# Patient Record
Sex: FEMALE | Race: WHITE | NOT HISPANIC OR LATINO | Employment: FULL TIME | URBAN - METROPOLITAN AREA
[De-identification: names, ages, dates, MRNs, and addresses within clinical notes are randomized per-mention and may not be internally consistent; named-entity substitution may affect disease eponyms.]

---

## 2017-02-01 RX ORDER — METOPROLOL SUCCINATE 100 MG/1
TABLET, EXTENDED RELEASE ORAL
Qty: 90 TAB | Refills: 1 | Status: SHIPPED | OUTPATIENT
Start: 2017-02-01 | End: 2017-08-03 | Stop reason: SDUPTHER

## 2017-06-05 ENCOUNTER — TELEPHONE (OUTPATIENT)
Dept: CARDIOLOGY | Facility: MEDICAL CENTER | Age: 52
End: 2017-06-05

## 2017-06-05 DIAGNOSIS — I10 ESSENTIAL HYPERTENSION: ICD-10-CM

## 2017-06-05 RX ORDER — LISINOPRIL AND HYDROCHLOROTHIAZIDE 20; 12.5 MG/1; MG/1
1 TABLET ORAL DAILY
Qty: 90 TAB | Refills: 0 | OUTPATIENT
Start: 2017-06-05 | End: 2017-08-31

## 2017-06-05 NOTE — TELEPHONE ENCOUNTER
----- Message from Evelin Feuntes, Med Ass't sent at 6/5/2017  2:33 PM PDT -----  Regarding: FW: refill on Lisinopril-hydrochlorothiazide  This is pending in Dr. Baeza's basket, please phone in or RS approve  Thank you     ----- Message -----     From: Consuelo Leger     Sent: 6/5/2017   1:59 PM       To: Evelin Fuentes Med Ass't  Subject: refill on Lisinopril-hydrochlorothiazide         Evelin,         Patient calling about refill for Lisinopil-hydrochlorothiazide to [facility] on [address]. She said she made a follow up appt, and she will try to get a couple pills from pharmacy to cover her until she gets her refill. She can be reached at [phone].

## 2017-06-05 NOTE — TELEPHONE ENCOUNTER
----- Message from Evelin Fuentes, Med Ass't sent at 6/5/2017  2:33 PM PDT -----  Regarding: FW: refill on Lisinopril-hydrochlorothiazide  This is pending in Dr. Baeza's basket, please phone in or RS approve  Thank you     ----- Message -----     From: Consuelo Leger     Sent: 6/5/2017   1:59 PM       To: Evelin Fuentes Med Ass't  Subject: refill on Lisinopril-hydrochlorothiazide         Evelin,         Patient calling about refill for Lisinopil-hydrochlorothiazide to [facility] on [address]. She said she made a follow up appt, and she will try to get a couple pills from pharmacy to cover her until she gets her refill. She can be reached at [phone].

## 2017-08-03 RX ORDER — METOPROLOL SUCCINATE 100 MG/1
100 TABLET, EXTENDED RELEASE ORAL
Qty: 90 TAB | Refills: 1 | Status: SHIPPED | OUTPATIENT
Start: 2017-08-03 | End: 2018-01-08 | Stop reason: SDUPTHER

## 2017-08-03 NOTE — TELEPHONE ENCOUNTER
Was the patient seen in the last year in this department? Yes     Does patient have an active prescription for medications requested? No     Received Request Via: Patient     Last visit:9/30/16

## 2017-08-31 ENCOUNTER — OFFICE VISIT (OUTPATIENT)
Dept: CARDIOLOGY | Facility: MEDICAL CENTER | Age: 52
End: 2017-08-31
Payer: COMMERCIAL

## 2017-08-31 VITALS
HEART RATE: 66 BPM | OXYGEN SATURATION: 94 % | BODY MASS INDEX: 34.49 KG/M2 | SYSTOLIC BLOOD PRESSURE: 120 MMHG | WEIGHT: 207 LBS | HEIGHT: 65 IN | DIASTOLIC BLOOD PRESSURE: 80 MMHG

## 2017-08-31 DIAGNOSIS — I15.0 RENOVASCULAR HYPERTENSION: ICD-10-CM

## 2017-08-31 DIAGNOSIS — E78.2 MIXED HYPERLIPIDEMIA: ICD-10-CM

## 2017-08-31 DIAGNOSIS — E03.2 HYPOTHYROIDISM DUE TO MEDICATION: ICD-10-CM

## 2017-08-31 PROCEDURE — 99213 OFFICE O/P EST LOW 20 MIN: CPT | Performed by: INTERNAL MEDICINE

## 2017-08-31 RX ORDER — LOSARTAN POTASSIUM AND HYDROCHLOROTHIAZIDE 25; 100 MG/1; MG/1
1 TABLET ORAL DAILY
Qty: 30 TAB | Refills: 3 | Status: SHIPPED | OUTPATIENT
Start: 2017-08-31 | End: 2018-01-02 | Stop reason: SDUPTHER

## 2017-08-31 ASSESSMENT — ENCOUNTER SYMPTOMS
PSYCHIATRIC NEGATIVE: 1
COUGH: 1
NAUSEA: 0
WEAKNESS: 0
CONSTITUTIONAL NEGATIVE: 1
NEUROLOGICAL NEGATIVE: 1
SHORTNESS OF BREATH: 0
VOMITING: 0

## 2017-08-31 NOTE — PROGRESS NOTES
Subjective:   Clary Bah is a 51 y.o. female who presents today For follow-up of hypertension and SVT. She's had virtually no palpitations at all. The patient has not been physically active. Weight is up about 10 pounds. Her morning coffee Enrico a bit more persistent and we discussed changing her from an ACE inhibitor to an ARB.  No other real interval health issues. She has hypothyroidism and had recent thyroid testing through Dr. Taylor's office.    Past Medical History:   Diagnosis Date   • Anesthesia     slow to wake up   • Heart palpitations    • Hypertension    • Hypothyroid    • Vitamin D deficiency      Past Surgical History:   Procedure Laterality Date   • RECTOCELE REPAIR  3/15/2011    Performed by MARK RAM[facility]RE[facility]   • BIOPSY GENERAL  10/20/08    Performed [facility]   • HYSTEROSCOPY WITH VIDEO OPERATIVE  10/20/08    Performed by MARK RAMÍREZ at [facility]   • GYN SURGERY         • BRIANNE BY LAPAROSCOPY       Family History   Problem Relation Age of Onset   • Arthritis Mother    • Hypertension Mother    • Heart Disease Father    • Diabetes Father    • Hypertension Sister      History   Smoking Status   • Never Smoker   Smokeless Tobacco   • Never Used     No Known Allergies  Outpatient Encounter Prescriptions as of 2017   Medication Sig Dispense Refill   • losartan-hydrochlorothiazide (HYZAAR) 100-25 MG per tablet Take 1 Tab by mouth every day. 30 Tab 3   • metoprolol SR (TOPROL XL) 100 MG TABLET SR 24 HR Take 1 Tab by mouth every day. 90 Tab 1   • levothyroxine (SYNTHROID) 25 MCG Tab Take 1 Tab by mouth every day. Takes 88 mcg +25 mcg twice weekly, 88 mcg all other days. Dr. Taylor 30 Tab 11   • cyclosporin (RESTASIS) 0.05 % ophthalmic emulsion Place 1 Drop in both eyes 2 times a day.     • levothyroxine (SYNTHROID) 88 MCG TABS Take 1 Tab by mouth every day. 90 Tab 1   •  "[DISCONTINUED] lisinopril-hydrochlorothiazide (PRINZIDE, ZESTORETIC) 20-12.5 MG per tablet Take 1 Tab by mouth every day. 90 Tab 0     No facility-administered encounter medications on file as of 8/31/2017.      Review of Systems   Constitutional: Negative.  Negative for malaise/fatigue.   Respiratory: Positive for cough. Negative for shortness of breath.    Cardiovascular: Negative for chest pain and leg swelling.   Gastrointestinal: Negative for nausea and vomiting.   Neurological: Negative.  Negative for weakness.   Psychiatric/Behavioral: Negative.         Objective:   /80   Pulse 66   Ht 1.651 m (5' 5\")   Wt 93.9 kg (207 lb)   SpO2 94%   BMI 34.45 kg/m²     Physical Exam   Constitutional: She is oriented to person, place, and time. She appears well-developed and well-nourished. No distress.   HENT:   Head: Atraumatic.   Eyes: Conjunctivae and EOM are normal. Pupils are equal, round, and reactive to light.   Neck: Neck supple. No JVD present.   Cardiovascular: Normal rate and regular rhythm.    No murmur heard.  Pulmonary/Chest: Effort normal and breath sounds normal. No respiratory distress. She has no wheezes. She has no rales.   Abdominal: Soft. There is no tenderness.   Musculoskeletal: She exhibits no edema.   Neurological: She is alert and oriented to person, place, and time.   Skin: Skin is warm and dry. She is not diaphoretic.       Assessment:     1. Mixed hyperlipidemia  LIPID PROFILE    COMP METABOLIC PANEL   2. Renovascular hypertension  LIPID PROFILE    COMP METABOLIC PANEL   3. Hypothyroidism due to medication         Medical Decision Making:  Today's Assessment / Status / Plan:   The patient is having cough with ACE inhibitor. By my reading her blood pressure is 150 systolic using a large cuff sitting. We discussed weight loss and increase activity as a good method for blood pressure control rather than increasing her medications. She'll be changed to losartan/HCTZ 100/25. Appropriate " laboratory testing will be ordered including lipids. Return in about one month for blood pressure check

## 2017-09-10 LAB
ALBUMIN SERPL-MCNC: 4.2 G/DL (ref 3.5–5.5)
ALBUMIN/GLOB SERPL: 1.6 {RATIO} (ref 1.2–2.2)
ALP SERPL-CCNC: 84 IU/L (ref 39–117)
ALT SERPL-CCNC: 36 IU/L (ref 0–32)
AST SERPL-CCNC: 23 IU/L (ref 0–40)
BILIRUB SERPL-MCNC: 0.3 MG/DL (ref 0–1.2)
BUN SERPL-MCNC: 14 MG/DL (ref 6–24)
BUN/CREAT SERPL: 18 (ref 9–23)
CALCIUM SERPL-MCNC: 9.7 MG/DL (ref 8.7–10.2)
CHLORIDE SERPL-SCNC: 102 MMOL/L (ref 96–106)
CHOLEST SERPL-MCNC: 207 MG/DL (ref 100–199)
CO2 SERPL-SCNC: 23 MMOL/L (ref 18–29)
COMMENT 011824: ABNORMAL
CREAT SERPL-MCNC: 0.77 MG/DL (ref 0.57–1)
GLOBULIN SER CALC-MCNC: 2.6 G/DL (ref 1.5–4.5)
GLUCOSE SERPL-MCNC: 99 MG/DL (ref 65–99)
HDLC SERPL-MCNC: 39 MG/DL
LDLC SERPL CALC-MCNC: 137 MG/DL (ref 0–99)
POTASSIUM SERPL-SCNC: 4.3 MMOL/L (ref 3.5–5.2)
PROT SERPL-MCNC: 6.8 G/DL (ref 6–8.5)
SODIUM SERPL-SCNC: 142 MMOL/L (ref 134–144)
TRIGL SERPL-MCNC: 155 MG/DL (ref 0–149)
VLDLC SERPL CALC-MCNC: 31 MG/DL (ref 5–40)

## 2017-09-14 ENCOUNTER — TELEPHONE (OUTPATIENT)
Dept: CARDIOLOGY | Facility: MEDICAL CENTER | Age: 52
End: 2017-09-14

## 2017-09-14 NOTE — TELEPHONE ENCOUNTER
----- Message from Palak Sagastume R.N. sent at 9/13/2017  5:15 PM PDT -----      ----- Message -----  From: Earl Baeza M.D.  Sent: 9/13/2017   4:44 PM  To: Clau Pruitt L.P.N.    Labs reviewed.  LDL is still mildly elevated, but better than last year.All else is good.

## 2017-09-28 ENCOUNTER — OFFICE VISIT (OUTPATIENT)
Dept: CARDIOLOGY | Facility: MEDICAL CENTER | Age: 52
End: 2017-09-28
Payer: COMMERCIAL

## 2017-09-28 VITALS
HEIGHT: 65 IN | WEIGHT: 207 LBS | HEART RATE: 84 BPM | BODY MASS INDEX: 34.49 KG/M2 | DIASTOLIC BLOOD PRESSURE: 80 MMHG | OXYGEN SATURATION: 95 % | SYSTOLIC BLOOD PRESSURE: 120 MMHG

## 2017-09-28 DIAGNOSIS — R00.2 HEART PALPITATIONS: ICD-10-CM

## 2017-09-28 DIAGNOSIS — E78.2 MIXED HYPERLIPIDEMIA: ICD-10-CM

## 2017-09-28 DIAGNOSIS — I10 ESSENTIAL HYPERTENSION: Primary | ICD-10-CM

## 2017-09-28 DIAGNOSIS — I47.10 SVT (SUPRAVENTRICULAR TACHYCARDIA): ICD-10-CM

## 2017-09-28 PROCEDURE — 99214 OFFICE O/P EST MOD 30 MIN: CPT | Performed by: NURSE PRACTITIONER

## 2017-09-28 ASSESSMENT — ENCOUNTER SYMPTOMS
PND: 0
ABDOMINAL PAIN: 0
WEAKNESS: 0
ORTHOPNEA: 0
DIZZINESS: 1
COUGH: 0
CLAUDICATION: 0
SHORTNESS OF BREATH: 0
MYALGIAS: 0
PALPITATIONS: 0

## 2017-09-28 NOTE — PROGRESS NOTES
Subjective:   Clary Bah is a 51 y.o. female who presents today To follow-up on blood pressure and medication change. She was last seen approximately one month ago by Dr. Baeza. He stopped lisinopril since she had a cough. He started her on losartan//25 mg daily.    She is checked her blood pressure at the grocery store and it has been running approximately 118-120 systolic. She does have some lightheadedness with rapid position change. She would like to keep the diuretic portion of her medication as she does get some ankle edema at times.    She has a history of SVT but has not had any palpitations or episodes of SVT for an extended period of time.    She started doing some walking for exercise and is tolerating it well. 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    Past Medical History:   Diagnosis Date   • Anesthesia     slow to wake up   • Heart palpitations    • Hypertension    • Hypothyroid    • Vitamin D deficiency      Past Surgical History:   Procedure Laterality Date   • RECTOCELE REPAIR  3/15/2011    Performed by MARK RAMÍREZ at SURGERY SAME DAY [facility] ORS   • BIOPSY GENERAL  10/20/08    Performed by MARK RAMÍREZ at SURGERY SAME DAY [facility] ORS   • HYSTEROSCOPY WITH VIDEO OPERATIVE  10/20/08    Performed by MARK RAMÍREZ at SURGERY SAME DAY [facility] ORS   • GYN SURGERY         • BRIANNE BY LAPAROSCOPY       Family History   Problem Relation Age of Onset   • Arthritis Mother    • Hypertension Mother    • Heart Disease Father 67     heart attack   • Diabetes Father    • Hypertension Sister      History   Smoking Status   • Never Smoker   Smokeless Tobacco   • Never Used     Allergies   Allergen Reactions   • Ace Inhibitors Cough     Outpatient Encounter Prescriptions as of 2017   Medication Sig Dispense Refill   • losartan-hydrochlorothiazide (HYZAAR) 100-25 MG per tablet Take 1 Tab by mouth  "every day. 30 Tab 3   • metoprolol SR (TOPROL XL) 100 MG TABLET SR 24 HR Take 1 Tab by mouth every day. 90 Tab 1   • levothyroxine (SYNTHROID) 25 MCG Tab Take 1 Tab by mouth every day. Takes 88 mcg +25 mcg twice weekly, 88 mcg all other days. Dr. Taylor 30 Tab 11   • cyclosporin (RESTASIS) 0.05 % ophthalmic emulsion Place 1 Drop in both eyes 2 times a day.     • levothyroxine (SYNTHROID) 88 MCG TABS Take 1 Tab by mouth every day. 90 Tab 1     No facility-administered encounter medications on file as of 9/28/2017.      Review of Systems   Constitutional: Positive for malaise/fatigue.   Respiratory: Negative for cough and shortness of breath.    Cardiovascular: Negative for chest pain, palpitations, orthopnea, claudication, leg swelling and PND.   Gastrointestinal: Negative for abdominal pain.   Musculoskeletal: Negative for myalgias.   Neurological: Positive for dizziness (position change). Negative for weakness.        Objective:   /80   Pulse 84   Ht 1.651 m (5' 5\")   Wt 93.9 kg (207 lb)   SpO2 95%   BMI 34.45 kg/m²     Physical Exam   Constitutional: She is oriented to person, place, and time. She appears well-developed and well-nourished.   Central obesity.   HENT:   Head: Normocephalic.   Eyes: Conjunctivae are normal.   Neck: No JVD present. No thyromegaly present.   Cardiovascular: Normal rate, regular rhythm, S1 normal, S2 normal and normal heart sounds.  Exam reveals no gallop, no S3, no S4 and no friction rub.    No murmur heard.  Pulmonary/Chest: Effort normal and breath sounds normal. No respiratory distress. She has no wheezes. She has no rales.   Abdominal: Soft. Bowel sounds are normal. She exhibits no distension. There is no tenderness.   Musculoskeletal: She exhibits edema (trace to 1+ bilateral ankle edema.).   Neurological: She is alert and oriented to person, place, and time.   Skin: Skin is warm and dry.   Psychiatric: She has a normal mood and affect.     Results for PATEL BAUTISTA " (MRN [id number]) as of 9/28/2017 14:54   Ref. Range 9/8/2017 07:30   Sodium Latest Ref Range: 134 - 144 mmol/L 142   Potassium Latest Ref Range: 3.5 - 5.2 mmol/L 4.3   Chloride Latest Ref Range: 96 - 106 mmol/L 102   Co2 Latest Ref Range: 18 - 29 mmol/L 23   Glucose Latest Ref Range: 65 - 99 mg/dL 99   Bun Latest Ref Range: 6 - 24 mg/dL 14   Creatinine Latest Ref Range: 0.57 - 1.00 mg/dL 0.77   GFR If  Latest Ref Range: >59 mL/min/1.73 104   GFR If Non  Latest Ref Range: >59 mL/min/1.73 90   Bun-Creatinine Ratio Latest Ref Range: 9 - 23  18   Calcium Latest Ref Range: 8.7 - 10.2 mg/dL 9.7   AST(SGOT) Latest Ref Range: 0 - 40 IU/L 23   ALT(SGPT) Latest Ref Range: 0 - 32 IU/L 36 (H)   Alkaline Phosphatase Latest Ref Range: 39 - 117 IU/L 84   Total Bilirubin Latest Ref Range: 0.0 - 1.2 mg/dL 0.3   Albumin Latest Ref Range: 3.5 - 5.5 g/dL 4.2   Total Protein Latest Ref Range: 6.0 - 8.5 g/dL 6.8   Globulin Latest Ref Range: 1.5 - 4.5 g/dL 2.6   A-G Ratio Latest Ref Range: 1.2 - 2.2  1.6   Cholesterol,Tot Latest Ref Range: 100 - 199 mg/dL 207 (H)   Triglycerides Latest Ref Range: 0 - 149 mg/dL 155 (H)   HDL Latest Ref Range: >39 mg/dL 39 (L)   LDL Latest Ref Range: 0 - 99 mg/dL 137 (H)     Assessment:     1. Essential hypertension     2. Heart palpitations     3. SVT (supraventricular tachycardia)     4. Mixed hyperlipidemia  COMP METABOLIC PANEL    LIPID PROFILE       Medical Decision Making:  Today's Assessment / Status / Plan:     Hypertension: Her blood pressure is good in the office today but I'm concerned it may become too low. I've asked her to maintain her hydration. Instead of changing to losartan without the diuretic will continue on losartan//25 mg daily and have her monitor her blood pressure.    I've asked her to obtain a large blood pressure cuff and check her blood pressure morning and evening.    Palpitations: She is not had any palpitations or rapid heart beats and  extended period of time. She does not want to change her metoprolol dose as it appears to be working.    Hyperlipidemia: Her lipids are elevated in particular her LDL. She would like to work on diet and exercise. I would not one start her on a lipid-lowering agent without doing diet and lifestyle first. She is willing to increase her walking and change her diet. She is motivated to lose weight. We'll check lipids and metabolic panel in 3 months.    She will follow-up in 3 months with myself to monitor her blood pressure and lipids. She will follow-up sooner if problems.    Collaborating Provider: Dr. Norma Santizo

## 2017-09-28 NOTE — LETTER
[facility]   [address] [place] [address] [address]  [place] [address]  Phone: [phone]  Fax: [phone]              Clary Bah  1965    Encounter Date: 2017    JIMMIE Rosas          PROGRESS NOTE:  Subjective:   Clary Bah is a 51 y.o. female who presents today To follow-up on blood pressure and medication change. She was last seen approximately one month ago by Dr. Baeza. He stopped lisinopril since she had a cough. He started her on losartan//25 mg daily.    She is checked her blood pressure at the grocery store and it has been running approximately 118-120 systolic. She does have some lightheadedness with rapid position change. She would like to keep the diuretic portion of her medication as she does get some ankle edema at times.    She has a history of SVT but has not had any palpitations or episodes of SVT for an extended period of time.    She started doing some walking for exercise and is tolerating it well. 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    Past Medical History:   Diagnosis Date   • Anesthesia     slow to wake up   • Heart palpitations    • Hypertension    • Hypothyroid    • Vitamin D deficiency      Past Surgical History:   Procedure Laterality Date   • RECTOCELE REPAIR  3/15/2011    Performed by MARK RAMÍREZ at SURGERY SAME DAY [facility] ORS   • BIOPSY GENERAL  10/20/08    Performed by MARK RAMÍREZ at SURGERY SAME DAY [facility] ORS   • HYSTEROSCOPY WITH VIDEO OPERATIVE  10/20/08    Performed by MARK RAMÍREZ at SURGERY SAME DAY [facility] ORS   • GYN SURGERY         • BRIANNE BY LAPAROSCOPY       Family History   Problem Relation Age of Onset   • Arthritis Mother    • Hypertension Mother    • Heart Disease Father 67     heart attack   • Diabetes Father    • Hypertension Sister      History   Smoking Status   • Never Smoker   "  Smokeless Tobacco   • Never Used     Allergies   Allergen Reactions   • Ace Inhibitors Cough     Outpatient Encounter Prescriptions as of 9/28/2017   Medication Sig Dispense Refill   • losartan-hydrochlorothiazide (HYZAAR) 100-25 MG per tablet Take 1 Tab by mouth every day. 30 Tab 3   • metoprolol SR (TOPROL XL) 100 MG TABLET SR 24 HR Take 1 Tab by mouth every day. 90 Tab 1   • levothyroxine (SYNTHROID) 25 MCG Tab Take 1 Tab by mouth every day. Takes 88 mcg +25 mcg twice weekly, 88 mcg all other days. Dr. Taylor 30 Tab 11   • cyclosporin (RESTASIS) 0.05 % ophthalmic emulsion Place 1 Drop in both eyes 2 times a day.     • levothyroxine (SYNTHROID) 88 MCG TABS Take 1 Tab by mouth every day. 90 Tab 1     No facility-administered encounter medications on file as of 9/28/2017.      Review of Systems   Constitutional: Positive for malaise/fatigue.   Respiratory: Negative for cough and shortness of breath.    Cardiovascular: Negative for chest pain, palpitations, orthopnea, claudication, leg swelling and PND.   Gastrointestinal: Negative for abdominal pain.   Musculoskeletal: Negative for myalgias.   Neurological: Positive for dizziness (position change). Negative for weakness.        Objective:   /80   Pulse 84   Ht 1.651 m (5' 5\")   Wt 93.9 kg (207 lb)   SpO2 95%   BMI 34.45 kg/m²      Physical Exam   Constitutional: She is oriented to person, place, and time. She appears well-developed and well-nourished.   Central obesity.   HENT:   Head: Normocephalic.   Eyes: Conjunctivae are normal.   Neck: No JVD present. No thyromegaly present.   Cardiovascular: Normal rate, regular rhythm, S1 normal, S2 normal and normal heart sounds.  Exam reveals no gallop, no S3, no S4 and no friction rub.    No murmur heard.  Pulmonary/Chest: Effort normal and breath sounds normal. No respiratory distress. She has no wheezes. She has no rales.   Abdominal: Soft. Bowel sounds are normal. She exhibits no distension. There is no " tenderness.   Musculoskeletal: She exhibits edema (trace to 1+ bilateral ankle edema.).   Neurological: She is alert and oriented to person, place, and time.   Skin: Skin is warm and dry.   Psychiatric: She has a normal mood and affect.     Results for PATEL BAUTISTA (MRN [id number]) as of 9/28/2017 14:54   Ref. Range 9/8/2017 07:30   Sodium Latest Ref Range: 134 - 144 mmol/L 142   Potassium Latest Ref Range: 3.5 - 5.2 mmol/L 4.3   Chloride Latest Ref Range: 96 - 106 mmol/L 102   Co2 Latest Ref Range: 18 - 29 mmol/L 23   Glucose Latest Ref Range: 65 - 99 mg/dL 99   Bun Latest Ref Range: 6 - 24 mg/dL 14   Creatinine Latest Ref Range: 0.57 - 1.00 mg/dL 0.77   GFR If  Latest Ref Range: >59 mL/min/1.73 104   GFR If Non  Latest Ref Range: >59 mL/min/1.73 90   Bun-Creatinine Ratio Latest Ref Range: 9 - 23  18   Calcium Latest Ref Range: 8.7 - 10.2 mg/dL 9.7   AST(SGOT) Latest Ref Range: 0 - 40 IU/L 23   ALT(SGPT) Latest Ref Range: 0 - 32 IU/L 36 (H)   Alkaline Phosphatase Latest Ref Range: 39 - 117 IU/L 84   Total Bilirubin Latest Ref Range: 0.0 - 1.2 mg/dL 0.3   Albumin Latest Ref Range: 3.5 - 5.5 g/dL 4.2   Total Protein Latest Ref Range: 6.0 - 8.5 g/dL 6.8   Globulin Latest Ref Range: 1.5 - 4.5 g/dL 2.6   A-G Ratio Latest Ref Range: 1.2 - 2.2  1.6   Cholesterol,Tot Latest Ref Range: 100 - 199 mg/dL 207 (H)   Triglycerides Latest Ref Range: 0 - 149 mg/dL 155 (H)   HDL Latest Ref Range: >39 mg/dL 39 (L)   LDL Latest Ref Range: 0 - 99 mg/dL 137 (H)     Assessment:     1. Essential hypertension     2. Heart palpitations     3. SVT (supraventricular tachycardia)     4. Mixed hyperlipidemia  COMP METABOLIC PANEL    LIPID PROFILE       Medical Decision Making:  Today's Assessment / Status / Plan:     Hypertension: Her blood pressure is good in the office today but I'm concerned it may become too low. I've asked her to maintain her hydration. Instead of changing to losartan without the diuretic  will continue on losartan//25 mg daily and have her monitor her blood pressure.    I've asked her to obtain a large blood pressure cuff and check her blood pressure morning and evening.    Palpitations: She is not had any palpitations or rapid heart beats and extended period of time. She does not want to change her metoprolol dose as it appears to be working.    Hyperlipidemia: Her lipids are elevated in particular her LDL. She would like to work on diet and exercise. I would not one start her on a lipid-lowering agent without doing diet and lifestyle first. She is willing to increase her walking and change her diet. She is motivated to lose weight. We'll check lipids and metabolic panel in 3 months.    She will follow-up in 3 months with myself to monitor her blood pressure and lipids. She will follow-up sooner if problems.    Collaborating Provider: Dr. Norma Santizo        No Recipients

## 2017-12-14 ENCOUNTER — OFFICE VISIT (OUTPATIENT)
Dept: URGENT CARE | Facility: CLINIC | Age: 52
End: 2017-12-14
Payer: COMMERCIAL

## 2017-12-14 DIAGNOSIS — K52.9 AGE (ACUTE GASTROENTERITIS): ICD-10-CM

## 2017-12-14 PROCEDURE — 99214 OFFICE O/P EST MOD 30 MIN: CPT | Performed by: FAMILY MEDICINE

## 2017-12-14 NOTE — LETTER
December 14, 2017         Patient: Clary Bah   YOB: 1965   Date of Visit: 12/14/2017           To Whom it May Concern:    Clary Bah was seen in my clinic on 12/14/2017. She may return to work on Monday.    If you have any questions or concerns, please don't hesitate to call.        Sincerely,           Delbert Melo M.D.  Electronically Signed

## 2017-12-14 NOTE — PATIENT INSTRUCTIONS
Food Choices to Help Relieve Diarrhea, Adult  When you have diarrhea, the foods you eat and your eating habits are very important. Choosing the right foods and drinks can help relieve diarrhea. Also, because diarrhea can last up to 7 days, you need to replace lost fluids and electrolytes (such as sodium, potassium, and chloride) in order to help prevent dehydration.   WHAT GENERAL GUIDELINES DO I NEED TO FOLLOW?  · Slowly drink 1 cup (8 oz) of fluid for each episode of diarrhea. If you are getting enough fluid, your urine will be clear or pale yellow.  · Eat starchy foods. Some good choices include white rice, white toast, pasta, low-fiber cereal, baked potatoes (without the skin), saltine crackers, and bagels.  · Avoid large servings of any cooked vegetables.  · Limit fruit to two servings per day. A serving is ½ cup or 1 small piece.  · Choose foods with less than 2 g of fiber per serving.  · Limit fats to less than 8 tsp (38 g) per day.  · Avoid fried foods.  · Eat foods that have probiotics in them. Probiotics can be found in certain dairy products.  · Avoid foods and beverages that may increase the speed at which food moves through the stomach and intestines (gastrointestinal tract). Things to avoid include:  ¨ High-fiber foods, such as dried fruit, raw fruits and vegetables, nuts, seeds, and whole grain foods.  ¨ Spicy foods and high-fat foods.  ¨ Foods and beverages sweetened with high-fructose corn syrup, honey, or sugar alcohols such as xylitol, sorbitol, and mannitol.  WHAT FOODS ARE RECOMMENDED?  Grains  White rice. White, [place], or doris breads (fresh or toasted), including plain rolls, buns, or bagels. White pasta. Saltine, soda, or pialr crackers. Pretzels. Low-fiber cereal. Cooked cereals made with water (such as cornmeal, farina, or cream cereals). Plain muffins. Matzo. Sabrina toast. Zwieback.   Vegetables  Potatoes (without the skin). Strained tomato and vegetable juices. Most well-cooked and canned  vegetables without seeds. Tender lettuce.  Fruits  Cooked or canned applesauce, apricots, cherries, fruit cocktail, grapefruit, peaches, pears, or plums. Fresh bananas, apples without skin, cherries, grapes, cantaloupe, grapefruit, peaches, oranges, or plums.   Meat and Other Protein Products  Baked or boiled chicken. Eggs. Tofu. Fish. Seafood. Smooth peanut butter. Ground or well-cooked tender beef, ham, veal, lamb, pork, or poultry.   Dairy  Plain yogurt, kefir, and unsweetened liquid yogurt. Lactose-free milk, buttermilk, or soy milk. Plain hard cheese.  Beverages  Sport drinks. Clear broths. Diluted fruit juices (except prune). Regular, caffeine-free sodas such as ginger ale. Water. Decaffeinated teas. Oral rehydration solutions. Sugar-free beverages not sweetened with sugar alcohols.  Other  Bouillon, broth, or soups made from recommended foods.   The items listed above may not be a complete list of recommended foods or beverages. Contact your dietitian for more options.  WHAT FOODS ARE NOT RECOMMENDED?  Grains  Whole grain, whole wheat, bran, or rye breads, rolls, pastas, crackers, and cereals. Wild or brown rice. Cereals that contain more than 2 g of fiber per serving. Corn tortillas or taco shells. Cooked or dry oatmeal. Granola. Popcorn.  Vegetables  Raw vegetables. Cabbage, broccoli, [place] sprouts, artichokes, baked beans, beet greens, corn, kale, legumes, peas, sweet potatoes, and yams. Potato skins. Cooked spinach and cabbage.  Fruits  Dried fruit, including raisins and dates. Raw fruits. Stewed or dried prunes. Fresh apples with skin, apricots, mangoes, pears, raspberries, and strawberries.   Meat and Other Protein Products  [place] peanut butter. Nuts and seeds. Beans and lentils. Jones.   Dairy  High-fat cheeses. Milk, chocolate milk, and beverages made with milk, such as milk shakes. Cream. Ice cream.  Sweets and Desserts  Sweet rolls, doughnuts, and sweet breads. Pancakes and waffles.  Fats and  Oils  Butter. Cream sauces. Margarine. Salad oils. Plain salad dressings. Olives. Avocados.   Beverages  Caffeinated beverages (such as coffee, tea, soda, or energy drinks). Alcoholic beverages. Fruit juices with pulp. Prune juice. Soft drinks sweetened with high-fructose corn syrup or sugar alcohols.  Other  Coconut. Hot sauce. Chili powder. Mayonnaise. Gravy. Cream-based or milk-based soups.   The items listed above may not be a complete list of foods and beverages to avoid. Contact your dietitian for more information.  WHAT SHOULD I DO IF I BECOME DEHYDRATED?  Diarrhea can sometimes lead to dehydration. Signs of dehydration include dark urine and dry mouth and skin. If you think you are dehydrated, you should rehydrate with an oral rehydration solution. These solutions can be purchased at pharmacies, retail stores, or online.   Drink ½-1 cup (120-240 mL) of oral rehydration solution each time you have an episode of diarrhea. If drinking this amount makes your diarrhea worse, try drinking smaller amounts more often. For example, drink 1-3 tsp (5-15 mL) every 5-10 minutes.   A general rule for staying hydrated is to drink 1½-2 L of fluid per day. Talk to your health care provider about the specific amount you should be drinking each day. Drink enough fluids to keep your urine clear or pale yellow.     This information is not intended to replace advice given to you by your health care provider. Make sure you discuss any questions you have with your health care provider.     Document Released: 03/09/2005 Document Revised: 01/08/2016 Document Reviewed: 11/10/2014  [employer] Patient Education ©2016 [employer]

## 2017-12-16 NOTE — PROGRESS NOTES
Chief Complaint   Patient presents with   • Diarrhea     X 3 days, Loss of apetite,  needs dr note            Diarrhea   This is a new problem. The current episode started in the past 3 days. The problem occurs 5 to 10 times per day. The problem has been unchanged. The stool consistency is described as watery. The patient states that diarrhea does not awaken from sleep. Associated symptoms include bloating. Pertinent negatives include no chills, coughing, fever, headaches, vomiting or weight loss. Nothing aggravates the symptoms. There are no known risk factors. Patient has tried nothing for the symptoms. There is no history of inflammatory bowel disease.       Social History     Social History   • Marital status: Single     Spouse name: N/A   • Number of children: N/A   • Years of education: N/A     Occupational History   • Not on file.     Social History Main Topics   • Smoking status: Never Smoker   • Smokeless tobacco: Never Used   • Alcohol use Yes      Comment: social rare   • Drug use: No   • Sexual activity: Yes     Partners: Male      Comment: vasectomy     Other Topics Concern   • Not on file     Social History Narrative   • No narrative on file           Past Medical History:   Diagnosis Date   • Anesthesia     slow to wake up   • Heart palpitations    • Hypertension    • Hypothyroid    • Vitamin D deficiency            Review of Systems   Constitutional: Negative for fever, chills and weight loss.   Respiratory: Negative for cough and wheezing.    Cardiovascular: Negative for chest pain.   Gastrointestinal: Positive for diarrhea and bloating. Negative for nausea, vomiting, abdominal pain and blood in stool.   Neurological: Negative for dizziness and headaches.   All other systems reviewed and are negative.         Objective:       Physical Exam   Constitutional: pt is oriented to person, place, and time and appears well-developed. No distress.   HENT:   Head: Normocephalic and atraumatic.    Mouth/Throat: No oropharyngeal exudate.   Eyes: Conjunctivae are normal. No scleral icterus.   Cardiovascular: Normal rate, regular rhythm and normal heart sounds.    Pulmonary/Chest: Effort normal and breath sounds normal. No respiratory distress. Pt has no wheezes. Pt has no rales.   Abdominal: Normal appearance and bowel sounds are normal. There is no splenomegaly or hepatomegaly. There is no tenderness. There is no rebound, no guarding, no CVA tenderness and no tenderness at McBurney's point.   Lymphadenopathy:     Pt has no cervical adenopathy.   Neurological: pt is alert and oriented to person, place, and time.   Skin: Skin is warm. Pt is not diaphoretic. No erythema.   Psychiatric:  behavior is normal.   Nursing note and vitals reviewed.              Assessment/Plan:         1. AGE (acute gastroenteritis)  Push fluids  BRAT diet x 24 hrs  Imodium 2mg bid, prn    Follow up in two days if no improvement, sooner if symptoms worsen.

## 2018-01-02 ENCOUNTER — TELEPHONE (OUTPATIENT)
Dept: CARDIOLOGY | Facility: MEDICAL CENTER | Age: 53
End: 2018-01-02

## 2018-01-02 DIAGNOSIS — I10 ESSENTIAL HYPERTENSION: ICD-10-CM

## 2018-01-02 RX ORDER — LOSARTAN POTASSIUM AND HYDROCHLOROTHIAZIDE 25; 100 MG/1; MG/1
1 TABLET ORAL DAILY
Qty: 90 TAB | Refills: 3 | OUTPATIENT
Start: 2018-01-02 | End: 2018-12-28 | Stop reason: SDUPTHER

## 2018-01-03 NOTE — TELEPHONE ENCOUNTER
Patient returned my call in regards to lab order, patient reports she will have labs done this week @[facility].

## 2018-01-05 LAB
ALBUMIN SERPL-MCNC: 4.2 G/DL (ref 3.5–5.5)
ALBUMIN/GLOB SERPL: 1.7 {RATIO} (ref 1.2–2.2)
ALP SERPL-CCNC: 91 IU/L (ref 39–117)
ALT SERPL-CCNC: 40 IU/L (ref 0–32)
AST SERPL-CCNC: 25 IU/L (ref 0–40)
BILIRUB SERPL-MCNC: 0.3 MG/DL (ref 0–1.2)
BUN SERPL-MCNC: 15 MG/DL (ref 6–24)
BUN/CREAT SERPL: 18 (ref 9–23)
CALCIUM SERPL-MCNC: 9.7 MG/DL (ref 8.7–10.2)
CHLORIDE SERPL-SCNC: 109 MMOL/L (ref 96–106)
CHOLEST SERPL-MCNC: 185 MG/DL (ref 100–199)
CO2 SERPL-SCNC: 22 MMOL/L (ref 18–29)
COMMENT 011824: ABNORMAL
CREAT SERPL-MCNC: 0.85 MG/DL (ref 0.57–1)
GLOBULIN SER CALC-MCNC: 2.5 G/DL (ref 1.5–4.5)
GLUCOSE SERPL-MCNC: 88 MG/DL (ref 65–99)
HDLC SERPL-MCNC: 36 MG/DL
IF AFRICAN AMERICAN  100797: 92 ML/MIN/1.73
IF NON AFRICAN AMER 100791: 80 ML/MIN/1.73
LDLC SERPL CALC-MCNC: 126 MG/DL (ref 0–99)
POTASSIUM SERPL-SCNC: 4.7 MMOL/L (ref 3.5–5.2)
PROT SERPL-MCNC: 6.7 G/DL (ref 6–8.5)
SODIUM SERPL-SCNC: 147 MMOL/L (ref 134–144)
TRIGL SERPL-MCNC: 116 MG/DL (ref 0–149)
VLDLC SERPL CALC-MCNC: 23 MG/DL (ref 5–40)

## 2018-01-08 ENCOUNTER — OFFICE VISIT (OUTPATIENT)
Dept: CARDIOLOGY | Facility: MEDICAL CENTER | Age: 53
End: 2018-01-08
Payer: COMMERCIAL

## 2018-01-08 VITALS
WEIGHT: 205 LBS | HEIGHT: 65 IN | HEART RATE: 78 BPM | OXYGEN SATURATION: 94 % | SYSTOLIC BLOOD PRESSURE: 124 MMHG | BODY MASS INDEX: 34.16 KG/M2 | DIASTOLIC BLOOD PRESSURE: 78 MMHG

## 2018-01-08 DIAGNOSIS — I10 ESSENTIAL HYPERTENSION: Primary | ICD-10-CM

## 2018-01-08 DIAGNOSIS — E78.2 MIXED HYPERLIPIDEMIA: ICD-10-CM

## 2018-01-08 DIAGNOSIS — R00.2 HEART PALPITATIONS: ICD-10-CM

## 2018-01-08 PROCEDURE — 99213 OFFICE O/P EST LOW 20 MIN: CPT | Performed by: NURSE PRACTITIONER

## 2018-01-08 RX ORDER — METOPROLOL SUCCINATE 100 MG/1
100 TABLET, EXTENDED RELEASE ORAL
Qty: 90 TAB | Refills: 3 | Status: SHIPPED | OUTPATIENT
Start: 2018-01-08 | End: 2019-02-01 | Stop reason: SDUPTHER

## 2018-01-08 ASSESSMENT — ENCOUNTER SYMPTOMS
COUGH: 0
ORTHOPNEA: 0
PALPITATIONS: 1
PND: 0
CLAUDICATION: 0
ABDOMINAL PAIN: 0
WEAKNESS: 0
DIZZINESS: 0
SHORTNESS OF BREATH: 0
MYALGIAS: 0

## 2018-01-08 NOTE — LETTER
[facility]   [address]  [place] [address]  Phone: [phone]  Fax: [phone]              Clary Bah  1965    Encounter Date: 2018    JIMMIE Rosas          PROGRESS NOTE:  Subjective:   Clary Bah is a 52 y.o. female who presents today To follow-up on hypertension and palpitations. She did not obtain a blood pressure cuff but has been checking it at the store and at other physician's offices. She states it's been running good. 2 weeks ago and her doctor's office it was 114/60.    She occasionally has palpitations usually at night when she is laying down. She describes a brief skipping of her heart. She is not had any rapid, sustained heart rates.    She is getting more sleep and therefore she is less tired than previously. This is due to a schedule change at work.        Past Medical History:   Diagnosis Date   • Anesthesia     slow to wake up   • Heart palpitations    • Hypertension    • Hypothyroid    • Vitamin D deficiency      Past Surgical History:   Procedure Laterality Date   • RECTOCELE REPAIR  3/15/2011    Performed by MARK RAMÍREZ at SURGERY SAME DAY [facility] ORS   • BIOPSY GENERAL  10/20/08    Performed by MARK RAMÍREZ at SURGERY SAME DAY [facility] ORS   • HYSTEROSCOPY WITH VIDEO OPERATIVE  10/20/08    Performed by MARK RAMÍREZ at SURGERY SAME DAY [facility]   • GYN SURGERY         • BRIANNE BY LAPAROSCOPY       Family History   Problem Relation Age of Onset   • Arthritis Mother    • Hypertension Mother    • Heart Disease Father 67     heart attack   • Diabetes Father    • Hypertension Sister      History   Smoking Status   • Never Smoker   Smokeless Tobacco   • Never Used     Allergies   Allergen Reactions   • Ace Inhibitors Cough     Outpatient Encounter Prescriptions as of 2018   Medication Sig Dispense Refill   • metoprolol SR (TOPROL XL) 100 MG TABLET SR 24 HR Take 1 Tab  "by mouth every day. 90 Tab 3   • losartan-hydrochlorothiazide (HYZAAR) 100-25 MG per tablet Take 1 Tab by mouth every day. 90 Tab 3   • levothyroxine (SYNTHROID) 25 MCG Tab Take 1 Tab by mouth every day. Takes 88 mcg +25 mcg twice weekly, 88 mcg all other days. Dr. Taylor 30 Tab 11   • cyclosporin (RESTASIS) 0.05 % ophthalmic emulsion Place 1 Drop in both eyes 2 times a day.     • levothyroxine (SYNTHROID) 88 MCG TABS Take 1 Tab by mouth every day. 90 Tab 1   • [DISCONTINUED] metoprolol SR (TOPROL XL) 100 MG TABLET SR 24 HR Take 1 Tab by mouth every day. 90 Tab 1     No facility-administered encounter medications on file as of 1/8/2018.      Review of Systems   Constitutional: Negative for malaise/fatigue.   Respiratory: Negative for cough and shortness of breath.    Cardiovascular: Positive for palpitations (occasional). Negative for chest pain, orthopnea, claudication, leg swelling and PND.   Gastrointestinal: Negative for abdominal pain.   Musculoskeletal: Negative for myalgias.   Neurological: Negative for dizziness and weakness.        Objective:   /78   Pulse 78   Ht 1.651 m (5' 5\")   Wt 93 kg (205 lb)   SpO2 94%   BMI 34.11 kg/m²      Physical Exam   Constitutional: She is oriented to person, place, and time. She appears well-developed and well-nourished.   Central obesity.   HENT:   Head: Normocephalic.   Eyes: Conjunctivae are normal.   Neck: No JVD present. No thyromegaly present.   Cardiovascular: Normal rate, regular rhythm, S1 normal, S2 normal and normal heart sounds.  Exam reveals no gallop, no S3, no S4 and no friction rub.    No murmur heard.  Pulmonary/Chest: Effort normal and breath sounds normal. No respiratory distress. She has no wheezes. She has no rales.   Abdominal: Soft. Bowel sounds are normal. She exhibits no distension. There is no tenderness.   Musculoskeletal: She exhibits no edema.   Neurological: She is alert and oriented to person, place, and time.   Skin: Skin is warm " and dry.   Psychiatric: She has a normal mood and affect.     Results for PATEL BAUTISTA (MRN [id number]) as of 1/8/2018 08:16   Ref. Range 1/4/2018 07:27   Sodium Latest Ref Range: 134 - 144 mmol/L 147 (H)   Potassium Latest Ref Range: 3.5 - 5.2 mmol/L 4.7   Chloride Latest Ref Range: 96 - 106 mmol/L 109 (H)   Co2 Latest Ref Range: 18 - 29 mmol/L 22   Glucose Latest Ref Range: 65 - 99 mg/dL 88   Bun Latest Ref Range: 6 - 24 mg/dL 15   Creatinine Latest Ref Range: 0.57 - 1.00 mg/dL 0.85   GFR If  Latest Ref Range: >59 mL/min/1.73 92   GFR If Non  Latest Ref Range: >59 mL/min/1.73 80   Bun-Creatinine Ratio Latest Ref Range: 9 - 23  18   Calcium Latest Ref Range: 8.7 - 10.2 mg/dL 9.7   AST(SGOT) Latest Ref Range: 0 - 40 IU/L 25   ALT(SGPT) Latest Ref Range: 0 - 32 IU/L 40 (H)   Alkaline Phosphatase Latest Ref Range: 39 - 117 IU/L 91   Total Bilirubin Latest Ref Range: 0.0 - 1.2 mg/dL 0.3   Albumin Latest Ref Range: 3.5 - 5.5 g/dL 4.2   Total Protein Latest Ref Range: 6.0 - 8.5 g/dL 6.7   Globulin Latest Ref Range: 1.5 - 4.5 g/dL 2.5   A-G Ratio Latest Ref Range: 1.2 - 2.2  1.7   Cholesterol,Tot Latest Ref Range: 100 - 199 mg/dL 185   Triglycerides Latest Ref Range: 0 - 149 mg/dL 116   HDL Latest Ref Range: >39 mg/dL 36 (L)   LDL Latest Ref Range: 0 - 99 mg/dL 126 (H)   VLDL Cholesterol Calc Latest Ref Range: 5 - 40 mg/dL 23     Assessment:     1. Essential hypertension     2. Heart palpitations     3. Mixed hyperlipidemia  COMP METABOLIC PANEL    LIPID PROFILE       Medical Decision Making:  Today's Assessment / Status / Plan:     Hypertension: Her blood pressure is excellent and the office today. We will keep her on the same medications.    Palpitations: Rare. Continue on metoprolol. She understands that these are benign.    Hyperlipidemia: Her lipids are improved. I would like to see her LDL come down further and her HDL go up. She is encouraged to do cardio-type exercise. She states  she would like to start.    She will follow-up in 6 months with Dr. Baeza, with lab work prior. She will follow-up sooner or contact us using my chart if needed.    Collaborating Provider: Dr. La.        No Recipients

## 2018-01-08 NOTE — PROGRESS NOTES
Subjective:   Clary Bah is a 52 y.o. female who presents today To follow-up on hypertension and palpitations. She did not obtain a blood pressure cuff but has been checking it at the store and at other physician's offices. She states it's been running good. 2 weeks ago and her doctor's office it was 114/60.    She occasionally has palpitations usually at night when she is laying down. She describes a brief skipping of her heart. She is not had any rapid, sustained heart rates.    She is getting more sleep and therefore she is less tired than previously. This is due to a schedule change at work.        Past Medical History:   Diagnosis Date   • Anesthesia     slow to wake up   • Heart palpitations    • Hypertension    • Hypothyroid    • Vitamin D deficiency      Past Surgical History:   Procedure Laterality Date   • RECTOCELE REPAIR  3/15/2011    Performed by MARK RAMÍREZ at SURGERY SAME DAY [facility] ORS   • BIOPSY GENERAL  10/20/08    Performed by MARK RAMÍREZ at SURGERY SAME DAY [facility] ORS   • HYSTEROSCOPY WITH VIDEO OPERATIVE  10/20/08    Performed by MARK RAMÍREZ at SURGERY SAME DAY [facility] ORS   • GYN SURGERY         • BRIANNE BY LAPAROSCOPY       Family History   Problem Relation Age of Onset   • Arthritis Mother    • Hypertension Mother    • Heart Disease Father 67     heart attack   • Diabetes Father    • Hypertension Sister      History   Smoking Status   • Never Smoker   Smokeless Tobacco   • Never Used     Allergies   Allergen Reactions   • Ace Inhibitors Cough     Outpatient Encounter Prescriptions as of 2018   Medication Sig Dispense Refill   • metoprolol SR (TOPROL XL) 100 MG TABLET SR 24 HR Take 1 Tab by mouth every day. 90 Tab 3   • losartan-hydrochlorothiazide (HYZAAR) 100-25 MG per tablet Take 1 Tab by mouth every day. 90 Tab 3   • levothyroxine (SYNTHROID) 25 MCG Tab Take 1 Tab by mouth every day. Takes 88 mcg +25 mcg twice weekly, 88 mcg all other days.   "[place] 30 Tab 11   • cyclosporin (RESTASIS) 0.05 % ophthalmic emulsion Place 1 Drop in both eyes 2 times a day.     • levothyroxine (SYNTHROID) 88 MCG TABS Take 1 Tab by mouth every day. 90 Tab 1   • [DISCONTINUED] metoprolol SR (TOPROL XL) 100 MG TABLET SR 24 HR Take 1 Tab by mouth every day. 90 Tab 1     No facility-administered encounter medications on file as of 1/8/2018.      Review of Systems   Constitutional: Negative for malaise/fatigue.   Respiratory: Negative for cough and shortness of breath.    Cardiovascular: Positive for palpitations (occasional). Negative for chest pain, orthopnea, claudication, leg swelling and PND.   Gastrointestinal: Negative for abdominal pain.   Musculoskeletal: Negative for myalgias.   Neurological: Negative for dizziness and weakness.        Objective:   /78   Pulse 78   Ht 1.651 m (5' 5\")   Wt 93 kg (205 lb)   SpO2 94%   BMI 34.11 kg/m²     Physical Exam   Constitutional: She is oriented to person, place, and time. She appears well-developed and well-nourished.   Central obesity.   HENT:   Head: Normocephalic.   Eyes: Conjunctivae are normal.   Neck: No JVD present. No thyromegaly present.   Cardiovascular: Normal rate, regular rhythm, S1 normal, S2 normal and normal heart sounds.  Exam reveals no gallop, no S3, no S4 and no friction rub.    No murmur heard.  Pulmonary/Chest: Effort normal and breath sounds normal. No respiratory distress. She has no wheezes. She has no rales.   Abdominal: Soft. Bowel sounds are normal. She exhibits no distension. There is no tenderness.   Musculoskeletal: She exhibits no edema.   Neurological: She is alert and oriented to person, place, and time.   Skin: Skin is warm and dry.   Psychiatric: She has a normal mood and affect.     Results for PATEL BAUTISTA (MRN [id number]) as of 1/8/2018 08:16   Ref. Range 1/4/2018 07:27   Sodium Latest Ref Range: 134 - 144 mmol/L 147 (H)   Potassium Latest Ref Range: 3.5 - 5.2 mmol/L 4.7   Chloride " Latest Ref Range: 96 - 106 mmol/L 109 (H)   Co2 Latest Ref Range: 18 - 29 mmol/L 22   Glucose Latest Ref Range: 65 - 99 mg/dL 88   Bun Latest Ref Range: 6 - 24 mg/dL 15   Creatinine Latest Ref Range: 0.57 - 1.00 mg/dL 0.85   GFR If  Latest Ref Range: >59 mL/min/1.73 92   GFR If Non  Latest Ref Range: >59 mL/min/1.73 80   Bun-Creatinine Ratio Latest Ref Range: 9 - 23  18   Calcium Latest Ref Range: 8.7 - 10.2 mg/dL 9.7   AST(SGOT) Latest Ref Range: 0 - 40 IU/L 25   ALT(SGPT) Latest Ref Range: 0 - 32 IU/L 40 (H)   Alkaline Phosphatase Latest Ref Range: 39 - 117 IU/L 91   Total Bilirubin Latest Ref Range: 0.0 - 1.2 mg/dL 0.3   Albumin Latest Ref Range: 3.5 - 5.5 g/dL 4.2   Total Protein Latest Ref Range: 6.0 - 8.5 g/dL 6.7   Globulin Latest Ref Range: 1.5 - 4.5 g/dL 2.5   A-G Ratio Latest Ref Range: 1.2 - 2.2  1.7   Cholesterol,Tot Latest Ref Range: 100 - 199 mg/dL 185   Triglycerides Latest Ref Range: 0 - 149 mg/dL 116   HDL Latest Ref Range: >39 mg/dL 36 (L)   LDL Latest Ref Range: 0 - 99 mg/dL 126 (H)   VLDL Cholesterol Calc Latest Ref Range: 5 - 40 mg/dL 23     Assessment:     1. Essential hypertension     2. Heart palpitations     3. Mixed hyperlipidemia  COMP METABOLIC PANEL    LIPID PROFILE       Medical Decision Making:  Today's Assessment / Status / Plan:     Hypertension: Her blood pressure is excellent and the office today. We will keep her on the same medications.    Palpitations: Rare. Continue on metoprolol. She understands that these are benign.    Hyperlipidemia: Her lipids are improved. I would like to see her LDL come down further and her HDL go up. She is encouraged to do cardio-type exercise. She states she would like to start.    She will follow-up in 6 months with Dr. Baeza, with lab work prior. She will follow-up sooner or contact us using my chart if needed.    Collaborating Provider: Dr. La.

## 2018-02-16 ENCOUNTER — OFFICE VISIT (OUTPATIENT)
Dept: URGENT CARE | Facility: CLINIC | Age: 53
End: 2018-02-16
Payer: COMMERCIAL

## 2018-02-16 ENCOUNTER — HOSPITAL ENCOUNTER (OUTPATIENT)
Facility: MEDICAL CENTER | Age: 53
End: 2018-02-16
Attending: FAMILY MEDICINE
Payer: COMMERCIAL

## 2018-02-16 VITALS
WEIGHT: 209 LBS | DIASTOLIC BLOOD PRESSURE: 70 MMHG | OXYGEN SATURATION: 97 % | HEIGHT: 65 IN | SYSTOLIC BLOOD PRESSURE: 130 MMHG | TEMPERATURE: 98 F | HEART RATE: 66 BPM | BODY MASS INDEX: 34.82 KG/M2

## 2018-02-16 DIAGNOSIS — R30.0 DYSURIA: ICD-10-CM

## 2018-02-16 LAB
APPEARANCE UR: CLEAR
BILIRUB UR STRIP-MCNC: NORMAL MG/DL
COLOR UR AUTO: YELLOW
GLUCOSE UR STRIP.AUTO-MCNC: NORMAL MG/DL
KETONES UR STRIP.AUTO-MCNC: NORMAL MG/DL
LEUKOCYTE ESTERASE UR QL STRIP.AUTO: NORMAL
NITRITE UR QL STRIP.AUTO: NORMAL
PH UR STRIP.AUTO: 7 [PH] (ref 5–8)
PROT UR QL STRIP: NORMAL MG/DL
RBC UR QL AUTO: NORMAL
SP GR UR STRIP.AUTO: 1.01
UROBILINOGEN UR STRIP-MCNC: NORMAL MG/DL

## 2018-02-16 PROCEDURE — 81002 URINALYSIS NONAUTO W/O SCOPE: CPT | Performed by: FAMILY MEDICINE

## 2018-02-16 PROCEDURE — 99214 OFFICE O/P EST MOD 30 MIN: CPT | Performed by: FAMILY MEDICINE

## 2018-02-16 PROCEDURE — 87086 URINE CULTURE/COLONY COUNT: CPT

## 2018-02-16 RX ORDER — NITROFURANTOIN 25; 75 MG/1; MG/1
100 CAPSULE ORAL EVERY 12 HOURS
Qty: 10 CAP | Refills: 0 | Status: SHIPPED | OUTPATIENT
Start: 2018-02-16 | End: 2018-02-21

## 2018-02-16 RX ORDER — PHENAZOPYRIDINE HYDROCHLORIDE 200 MG/1
200 TABLET, FILM COATED ORAL 3 TIMES DAILY
Qty: 6 TAB | Refills: 0 | Status: SHIPPED | OUTPATIENT
Start: 2018-02-16 | End: 2018-02-18

## 2018-02-16 ASSESSMENT — ENCOUNTER SYMPTOMS
FOCAL WEAKNESS: 0
FEVER: 0
DIZZINESS: 0
ABDOMINAL PAIN: 0
CHILLS: 0
NAUSEA: 0
VOMITING: 0

## 2018-02-16 NOTE — PROGRESS NOTES
"Subjective:      Clary Bah is a 52 y.o. female who presents with Dysuria (urgency x 3 days)    Chief Complaint   Patient presents with   • Dysuria     urgency x 3 days        - This is a very pleasant 52 y.o. female with complaints of freq/pain x 3 days.           ALLERGIES:  Ace inhibitors     PMH:  Past Medical History:   Diagnosis Date   • Anesthesia     slow to wake up   • Heart palpitations    • Hypertension    • Hypothyroid    • Vitamin D deficiency         MEDS:    Current Outpatient Prescriptions:   •  nitrofurantoin monohydr macro (MACROBID) 100 MG Cap, Take 1 Cap by mouth every 12 hours for 5 days., Disp: 10 Cap, Rfl: 0  •  phenazopyridine (PYRIDIUM) 200 MG Tab, Take 1 Tab by mouth 3 times a day for 2 days., Disp: 6 Tab, Rfl: 0  •  metoprolol SR (TOPROL XL) 100 MG TABLET SR 24 HR, Take 1 Tab by mouth every day., Disp: 90 Tab, Rfl: 3  •  losartan-hydrochlorothiazide (HYZAAR) 100-25 MG per tablet, Take 1 Tab by mouth every day., Disp: 90 Tab, Rfl: 3  •  cyclosporin (RESTASIS) 0.05 % ophthalmic emulsion, Place 1 Drop in both eyes 2 times a day., Disp: , Rfl:   •  levothyroxine (SYNTHROID) 88 MCG TABS, Take 1 Tab by mouth every day., Disp: 90 Tab, Rfl: 1  •  levothyroxine (SYNTHROID) 25 MCG Tab, Take 1 Tab by mouth every day. Takes 88 mcg +25 mcg twice weekly, 88 mcg all other days. Dr. Taylor, Disp: 30 Tab, Rfl: 11    ** I have documented what I find to be significant in regards to past medical, social, family and surgical history  in my HPI or under PMH/PSH/FH review section, otherwise it is contributory **           HPI    Review of Systems   Constitutional: Negative for chills and fever.   Gastrointestinal: Negative for abdominal pain, nausea and vomiting.   Genitourinary: Positive for dysuria and frequency.   Neurological: Negative for dizziness and focal weakness.          Objective:     /70   Pulse 66   Temp 36.7 °C (98 °F)   Ht 1.651 m (5' 5\")   Wt 94.8 kg (209 lb)   SpO2 97%   BMI " 34.78 kg/m²      Physical Exam   Constitutional: She appears well-developed. No distress.   HENT:   Head: Normocephalic and atraumatic.   Neck: Neck supple.   Cardiovascular: Regular rhythm.    No murmur heard.  Pulmonary/Chest: Effort normal. No respiratory distress.   Abdominal: Soft. There is no tenderness.   Neurological: She is alert. She exhibits normal muscle tone.   Skin: Skin is warm and dry.   Psychiatric: She has a normal mood and affect. Judgment normal.   Nursing note and vitals reviewed.              Assessment/Plan:         1. Dysuria  POCT Urinalysis    Urine Culture    nitrofurantoin monohydr macro (MACROBID) 100 MG Cap    phenazopyridine (PYRIDIUM) 200 MG Tab             Dx & d/c instructions discussed w/ patient and/or family members. Follow up w/ Prvt Dr or here in 3-4 days if not getting better, sooner if needed,  ER if worse and UC/PCP unavailable.        Possible side effects (i.e. Rash, GI upset/constipation, sedation, elevation of BP or sugars) of any medications given discussed.

## 2018-02-17 DIAGNOSIS — R30.0 DYSURIA: ICD-10-CM

## 2018-02-19 LAB
BACTERIA UR CULT: NORMAL
SIGNIFICANT IND 70042: NORMAL
SITE SITE: NORMAL
SOURCE SOURCE: NORMAL

## 2018-02-27 ENCOUNTER — APPOINTMENT (RX ONLY)
Dept: URBAN - METROPOLITAN AREA CLINIC 4 | Facility: CLINIC | Age: 53
Setting detail: DERMATOLOGY
End: 2018-02-27

## 2018-02-27 DIAGNOSIS — D18.0 HEMANGIOMA: ICD-10-CM

## 2018-02-27 DIAGNOSIS — D22 MELANOCYTIC NEVI: ICD-10-CM

## 2018-02-27 DIAGNOSIS — L30.9 DERMATITIS, UNSPECIFIED: ICD-10-CM

## 2018-02-27 DIAGNOSIS — Z85.828 PERSONAL HISTORY OF OTHER MALIGNANT NEOPLASM OF SKIN: ICD-10-CM

## 2018-02-27 DIAGNOSIS — L81.4 OTHER MELANIN HYPERPIGMENTATION: ICD-10-CM

## 2018-02-27 DIAGNOSIS — L57.8 OTHER SKIN CHANGES DUE TO CHRONIC EXPOSURE TO NONIONIZING RADIATION: ICD-10-CM

## 2018-02-27 DIAGNOSIS — L82.1 OTHER SEBORRHEIC KERATOSIS: ICD-10-CM

## 2018-02-27 PROBLEM — D22.5 MELANOCYTIC NEVI OF TRUNK: Status: ACTIVE | Noted: 2018-02-27

## 2018-02-27 PROBLEM — D18.01 HEMANGIOMA OF SKIN AND SUBCUTANEOUS TISSUE: Status: ACTIVE | Noted: 2018-02-27

## 2018-02-27 PROCEDURE — 99213 OFFICE O/P EST LOW 20 MIN: CPT

## 2018-02-27 PROCEDURE — ? COUNSELING

## 2018-02-27 PROCEDURE — ? PRESCRIPTION

## 2018-02-27 RX ORDER — TRIAMCINOLONE ACETONIDE 1 MG/G
CREAM TOPICAL BID
Qty: 1 | Refills: 3 | Status: ERX | COMMUNITY
Start: 2018-02-27

## 2018-02-27 RX ADMIN — TRIAMCINOLONE ACETONIDE 1: 1 CREAM TOPICAL at 00:00

## 2018-02-27 ASSESSMENT — LOCATION SIMPLE DESCRIPTION DERM
LOCATION SIMPLE: LEFT UPPER BACK
LOCATION SIMPLE: RIGHT CHEEK
LOCATION SIMPLE: LEFT HAND
LOCATION SIMPLE: RIGHT ANTERIOR NECK
LOCATION SIMPLE: RIGHT HAND
LOCATION SIMPLE: RIGHT UPPER BACK

## 2018-02-27 ASSESSMENT — LOCATION ZONE DERM
LOCATION ZONE: HAND
LOCATION ZONE: NECK
LOCATION ZONE: TRUNK
LOCATION ZONE: FACE

## 2018-02-27 ASSESSMENT — LOCATION DETAILED DESCRIPTION DERM
LOCATION DETAILED: RIGHT INFERIOR CENTRAL MALAR CHEEK
LOCATION DETAILED: RIGHT INFERIOR UPPER BACK
LOCATION DETAILED: LEFT SUPERIOR UPPER BACK
LOCATION DETAILED: LEFT RADIAL DORSAL HAND
LOCATION DETAILED: RIGHT INFERIOR MEDIAL UPPER BACK
LOCATION DETAILED: RIGHT INFERIOR ANTERIOR NECK
LOCATION DETAILED: RIGHT SUPERIOR UPPER BACK
LOCATION DETAILED: RIGHT RADIAL DORSAL HAND

## 2018-05-12 ENCOUNTER — OFFICE VISIT (OUTPATIENT)
Dept: URGENT CARE | Facility: CLINIC | Age: 53
End: 2018-05-12
Payer: COMMERCIAL

## 2018-05-12 VITALS
BODY MASS INDEX: 35.65 KG/M2 | HEART RATE: 74 BPM | TEMPERATURE: 97.6 F | WEIGHT: 214 LBS | RESPIRATION RATE: 16 BRPM | OXYGEN SATURATION: 99 % | DIASTOLIC BLOOD PRESSURE: 74 MMHG | HEIGHT: 65 IN | SYSTOLIC BLOOD PRESSURE: 120 MMHG

## 2018-05-12 DIAGNOSIS — R05.9 COUGH: ICD-10-CM

## 2018-05-12 DIAGNOSIS — H92.09 OTALGIA, UNSPECIFIED LATERALITY: ICD-10-CM

## 2018-05-12 DIAGNOSIS — J03.90 TONSILLITIS: ICD-10-CM

## 2018-05-12 LAB
INT CON NEG: NORMAL
INT CON POS: NORMAL
S PYO AG THROAT QL: NEGATIVE

## 2018-05-12 PROCEDURE — 99214 OFFICE O/P EST MOD 30 MIN: CPT | Performed by: PHYSICIAN ASSISTANT

## 2018-05-12 PROCEDURE — 87880 STREP A ASSAY W/OPTIC: CPT | Performed by: PHYSICIAN ASSISTANT

## 2018-05-12 RX ORDER — CEFUROXIME AXETIL 500 MG/1
500 TABLET ORAL 2 TIMES DAILY
Qty: 20 TAB | Refills: 0 | Status: SHIPPED | OUTPATIENT
Start: 2018-05-12 | End: 2018-05-22

## 2018-05-12 ASSESSMENT — ENCOUNTER SYMPTOMS
CARDIOVASCULAR NEGATIVE: 1
SWOLLEN GLANDS: 1
COUGH: 1
EYES NEGATIVE: 1
SORE THROAT: 1
SPUTUM PRODUCTION: 0
CONSTITUTIONAL NEGATIVE: 1
SHORTNESS OF BREATH: 0
TROUBLE SWALLOWING: 1
WHEEZING: 0

## 2018-05-12 NOTE — PROGRESS NOTES
Subjective:      Clary Bah is a 52 y.o. female who presents with Pharyngitis (sore throat, ear pain, cough)            Pharyngitis    This is a new (st, cough, ear pn) problem. The current episode started in the past 7 days. The problem has been unchanged. Neither side of throat is experiencing more pain than the other. There has been no fever. The pain is moderate. Associated symptoms include coughing, ear pain, swollen glands and trouble swallowing. Pertinent negatives include no shortness of breath. She has had no exposure to strep or mono. She has tried nothing for the symptoms. The treatment provided no relief.       Review of Systems   Constitutional: Negative.    HENT: Positive for ear pain, sore throat and trouble swallowing.    Eyes: Negative.    Respiratory: Positive for cough. Negative for sputum production, shortness of breath and wheezing.    Cardiovascular: Negative.    Skin: Negative.           Objective:     There were no vitals taken for this visit.     Physical Exam   Constitutional: She is oriented to person, place, and time. She appears well-developed and well-nourished. No distress.   HENT:   Head: Normocephalic and atraumatic.   +phar./tons redn     Eyes: EOM are normal. Pupils are equal, round, and reactive to light.   Neck: Normal range of motion. Neck supple.   Cardiovascular: Normal rate.    Pulmonary/Chest: Effort normal and breath sounds normal. No respiratory distress. She has no wheezes. She has no rales.   Lymphadenopathy:     She has cervical adenopathy.   Neurological: She is alert and oriented to person, place, and time.   Skin: Skin is warm and dry.   Psychiatric: She has a normal mood and affect. Her behavior is normal.   Nursing note and vitals reviewed.    Active Ambulatory Problems     Diagnosis Date Noted   • Heart palpitations    • Essential hypertension    • Hypothyroid    • Low back pain radiating to right leg 08/05/2013   • Mixed hyperlipidemia 01/25/2016   •  Tachycardia 01/25/2016   • SVT (supraventricular tachycardia) ([facility]) 03/08/2016     Resolved Ambulatory Problems     Diagnosis Date Noted   • No Resolved Ambulatory Problems     Past Medical History:   Diagnosis Date   • Anesthesia    • Heart palpitations    • Hypertension    • Hypothyroid    • Vitamin D deficiency      Current Outpatient Prescriptions on File Prior to Visit   Medication Sig Dispense Refill   • metoprolol SR (TOPROL XL) 100 MG TABLET SR 24 HR Take 1 Tab by mouth every day. 90 Tab 3   • losartan-hydrochlorothiazide (HYZAAR) 100-25 MG per tablet Take 1 Tab by mouth every day. 90 Tab 3   • levothyroxine (SYNTHROID) 25 MCG Tab Take 1 Tab by mouth every day. Takes 88 mcg +25 mcg twice weekly, 88 mcg all other days. Dr. Taylor 30 Tab 11   • cyclosporin (RESTASIS) 0.05 % ophthalmic emulsion Place 1 Drop in both eyes 2 times a day.     • levothyroxine (SYNTHROID) 88 MCG TABS Take 1 Tab by mouth every day. 90 Tab 1     No current facility-administered medications on file prior to visit.      Social History     Social History   • Marital status: Single     Spouse name: N/A   • Number of children: N/A   • Years of education: N/A     Occupational History   • Not on file.     Social History Main Topics   • Smoking status: Never Smoker   • Smokeless tobacco: Never Used   • Alcohol use Yes      Comment: social rare   • Drug use: No   • Sexual activity: Yes     Partners: Male      Comment: vasectomy     Other Topics Concern   • Not on file     Social History Narrative   • No narrative on file     Family History   Problem Relation Age of Onset   • Arthritis Mother    • Hypertension Mother    • Heart Disease Father 67     heart attack   • Diabetes Father    • Hypertension Sister      Ace inhibitors              Assessment/Plan:     ·  sT, cough, otalgia      · rx meds; otc prn  ·

## 2018-07-02 ENCOUNTER — TELEPHONE (OUTPATIENT)
Dept: CARDIOLOGY | Facility: MEDICAL CENTER | Age: 53
End: 2018-07-02

## 2018-07-02 NOTE — TELEPHONE ENCOUNTER
----- Message from Consuelo Leger sent at 7/2/2018 11:40 AM PDT -----  Regarding: patient returning your call  Duane,       Patient called back, she said she's planning to get the lab work done in the next few days.

## 2018-07-07 LAB
ALBUMIN SERPL-MCNC: 4.2 G/DL (ref 3.5–5.5)
ALBUMIN/GLOB SERPL: 1.6 {RATIO} (ref 1.2–2.2)
ALP SERPL-CCNC: 87 IU/L (ref 39–117)
ALT SERPL-CCNC: 44 IU/L (ref 0–32)
AST SERPL-CCNC: 26 IU/L (ref 0–40)
BILIRUB SERPL-MCNC: 0.3 MG/DL (ref 0–1.2)
BUN SERPL-MCNC: 14 MG/DL (ref 6–24)
BUN/CREAT SERPL: 19 (ref 9–23)
CALCIUM SERPL-MCNC: 9.8 MG/DL (ref 8.7–10.2)
CHLORIDE SERPL-SCNC: 104 MMOL/L (ref 96–106)
CHOLEST SERPL-MCNC: 206 MG/DL (ref 100–199)
CO2 SERPL-SCNC: 22 MMOL/L (ref 20–29)
CREAT SERPL-MCNC: 0.74 MG/DL (ref 0.57–1)
GLOBULIN SER CALC-MCNC: 2.7 G/DL (ref 1.5–4.5)
GLUCOSE SERPL-MCNC: 102 MG/DL (ref 65–99)
HDLC SERPL-MCNC: 35 MG/DL
IF AFRICAN AMERICAN  100797: 108 ML/MIN/1.73
IF NON AFRICAN AMER 100791: 94 ML/MIN/1.73
LABORATORY COMMENT REPORT: ABNORMAL
LDLC SERPL CALC-MCNC: 135 MG/DL (ref 0–99)
POTASSIUM SERPL-SCNC: 4.1 MMOL/L (ref 3.5–5.2)
PROT SERPL-MCNC: 6.9 G/DL (ref 6–8.5)
SODIUM SERPL-SCNC: 142 MMOL/L (ref 134–144)
TRIGL SERPL-MCNC: 182 MG/DL (ref 0–149)
VLDLC SERPL CALC-MCNC: 36 MG/DL (ref 5–40)

## 2018-07-09 ENCOUNTER — TELEPHONE (OUTPATIENT)
Dept: CARDIOLOGY | Facility: MEDICAL CENTER | Age: 53
End: 2018-07-09

## 2018-07-09 ENCOUNTER — OFFICE VISIT (OUTPATIENT)
Dept: CARDIOLOGY | Facility: MEDICAL CENTER | Age: 53
End: 2018-07-09
Payer: COMMERCIAL

## 2018-07-09 VITALS
DIASTOLIC BLOOD PRESSURE: 78 MMHG | OXYGEN SATURATION: 96 % | HEART RATE: 74 BPM | SYSTOLIC BLOOD PRESSURE: 126 MMHG | BODY MASS INDEX: 34.99 KG/M2 | WEIGHT: 210 LBS | HEIGHT: 65 IN

## 2018-07-09 DIAGNOSIS — R00.2 HEART PALPITATIONS: ICD-10-CM

## 2018-07-09 DIAGNOSIS — I10 ESSENTIAL HYPERTENSION: ICD-10-CM

## 2018-07-09 DIAGNOSIS — E78.2 MIXED HYPERLIPIDEMIA: ICD-10-CM

## 2018-07-09 PROCEDURE — 99213 OFFICE O/P EST LOW 20 MIN: CPT | Performed by: INTERNAL MEDICINE

## 2018-07-09 ASSESSMENT — ENCOUNTER SYMPTOMS
WEAKNESS: 0
CONSTITUTIONAL NEGATIVE: 1
SHORTNESS OF BREATH: 0
NAUSEA: 0
PALPITATIONS: 0
VOMITING: 0

## 2018-07-09 NOTE — PROGRESS NOTES
Chief Complaint   Patient presents with   • HTN (Controlled)       Subjective:   Clary Bah is a 52 y.o. female who presents today for follow-up of hypertension and palpitations.  Her cough went totally away since changing her from an ACE to an ARB.  Blood pressure at home have been under great control.  She has had no sense of palpitations.  Interval history notable for some discomfort with swallowing  Past Medical History:   Diagnosis Date   • Anesthesia     slow to wake up   • Heart palpitations    • Hypertension    • Hypothyroid    • Vitamin D deficiency      Past Surgical History:   Procedure Laterality Date   • RECTOCELE REPAIR  3/15/2011    Performed by MARK RAMÍREZ at SURGERY SAME DAY [facility] ORS   • BIOPSY GENERAL  10/20/08    Performed by MARK RAMÍREZ at SURGERY SAME DAY [facility] ORS   • HYSTEROSCOPY WITH VIDEO OPERATIVE  10/20/08    Performed by MARK RAMÍREZ at SURGERY SAME DAY [facility] ORS   • GYN SURGERY         • BRIANNE BY LAPAROSCOPY       Family History   Problem Relation Age of Onset   • Arthritis Mother    • Hypertension Mother    • Heart Disease Father 67     heart attack   • Diabetes Father    • Hypertension Sister      Social History     Social History   • Marital status: Single     Spouse name: N/A   • Number of children: N/A   • Years of education: N/A     Occupational History   • Not on file.     Social History Main Topics   • Smoking status: Never Smoker   • Smokeless tobacco: Never Used   • Alcohol use Yes      Comment: social rare   • Drug use: No   • Sexual activity: Yes     Partners: Male      Comment: vasectomy     Other Topics Concern   • Not on file     Social History Narrative   • No narrative on file     Allergies   Allergen Reactions   • Ace Inhibitors Cough     Outpatient Encounter Prescriptions as of 2018   Medication Sig Dispense Refill   • metoprolol SR (TOPROL XL) 100 MG TABLET SR 24 HR Take 1 Tab by mouth every day. 90 Tab 3   •  "losartan-hydrochlorothiazide (HYZAAR) 100-25 MG per tablet Take 1 Tab by mouth every day. 90 Tab 3   • levothyroxine (SYNTHROID) 25 MCG Tab Take 1 Tab by mouth every day. Takes 88 mcg +25 mcg twice weekly, 88 mcg all other days. Dr. Taylor 30 Tab 11   • cyclosporin (RESTASIS) 0.05 % ophthalmic emulsion Place 1 Drop in both eyes 2 times a day.     • levothyroxine (SYNTHROID) 88 MCG TABS Take 1 Tab by mouth every day. 90 Tab 1     No facility-administered encounter medications on file as of 7/9/2018.      Review of Systems   Constitutional: Negative.  Negative for malaise/fatigue.   Respiratory: Negative for shortness of breath.    Cardiovascular: Negative for chest pain, palpitations and leg swelling.   Gastrointestinal: Negative for nausea and vomiting.   Neurological: Negative for weakness.        Objective:   /78   Pulse 74   Ht 1.651 m (5' 5\")   Wt 95.3 kg (210 lb)   SpO2 96%   BMI 34.95 kg/m²     Physical Exam   Constitutional: She is oriented to person, place, and time. No distress.   HENT:   Head: Normocephalic and atraumatic.   Eyes: Pupils are equal, round, and reactive to light. No scleral icterus.   Neck: No JVD present.   Cardiovascular: Normal rate and regular rhythm.    No murmur heard.  Pulmonary/Chest: No respiratory distress. She has no wheezes.   Abdominal: Soft. She exhibits no distension. There is no tenderness.   Musculoskeletal: She exhibits no edema.   Neurological: She is alert and oriented to person, place, and time.   Skin: Skin is warm.   Psychiatric: She has a normal mood and affect.       Assessment:     1. Heart palpitations     2. Essential hypertension         Medical Decision Making:  Today's Assessment / Status / Plan:   Hypertension: Under very good control on her current medical regimen.  Palpitations: Quiescent on her current medications.    Difficulty swallowing: Encouraged her to follow-up with GI regarding this issue.    Mild hyperlipidemia: Lab from July 6 was " discussed with her.  Discussed diet and weight.  Return one year.

## 2018-08-03 ENCOUNTER — OFFICE VISIT (OUTPATIENT)
Dept: URGENT CARE | Facility: CLINIC | Age: 53
End: 2018-08-03
Payer: COMMERCIAL

## 2018-08-03 VITALS
BODY MASS INDEX: 36.7 KG/M2 | HEIGHT: 64 IN | TEMPERATURE: 99.3 F | RESPIRATION RATE: 18 BRPM | OXYGEN SATURATION: 95 % | DIASTOLIC BLOOD PRESSURE: 88 MMHG | SYSTOLIC BLOOD PRESSURE: 118 MMHG | WEIGHT: 215 LBS | HEART RATE: 94 BPM

## 2018-08-03 DIAGNOSIS — R05.9 COUGH: ICD-10-CM

## 2018-08-03 DIAGNOSIS — J01.40 ACUTE PANSINUSITIS, RECURRENCE NOT SPECIFIED: ICD-10-CM

## 2018-08-03 PROCEDURE — 99214 OFFICE O/P EST MOD 30 MIN: CPT | Performed by: PHYSICIAN ASSISTANT

## 2018-08-03 RX ORDER — DOXYCYCLINE HYCLATE 100 MG
100 TABLET ORAL 2 TIMES DAILY
Qty: 10 TAB | Refills: 0 | Status: SHIPPED | OUTPATIENT
Start: 2018-08-03 | End: 2018-08-08

## 2018-08-03 RX ORDER — CODEINE PHOSPHATE AND GUAIFENESIN 10; 100 MG/5ML; MG/5ML
5 SOLUTION ORAL EVERY 6 HOURS PRN
Qty: 100 ML | Refills: 0 | Status: SHIPPED | OUTPATIENT
Start: 2018-08-03 | End: 2018-08-08

## 2018-08-03 RX ORDER — FLUTICASONE PROPIONATE 50 MCG
1 SPRAY, SUSPENSION (ML) NASAL DAILY
Qty: 16 G | Refills: 0 | Status: SHIPPED | OUTPATIENT
Start: 2018-08-03 | End: 2022-10-07

## 2018-08-03 ASSESSMENT — ENCOUNTER SYMPTOMS
WHEEZING: 0
HEADACHES: 1
DIARRHEA: 0
EYE DISCHARGE: 0
EYE REDNESS: 0
COUGH: 1
SORE THROAT: 1
CHILLS: 1
ABDOMINAL PAIN: 0
SINUS PRESSURE: 1
FEVER: 0
NECK PAIN: 0
TINGLING: 0
SPUTUM PRODUCTION: 1
MYALGIAS: 0
DIZZINESS: 0
SINUS PAIN: 1
VOMITING: 0

## 2018-08-03 NOTE — PROGRESS NOTES
"Subjective:      Clary Bah is a 52 y.o. female who presents with Cough (x1week, cough, chest congestion, unable to sleep, cannot lay down, phlegm, body aches)            Sinusitis   This is a new problem. The current episode started in the past 7 days. The problem has been gradually worsening since onset. There has been no fever (Reports subjective fevers). Her pain is at a severity of 4/10. The pain is moderate. Associated symptoms include chills, congestion, coughing, headaches, sinus pressure and a sore throat. Pertinent negatives include no ear pain or neck pain. Treatments tried: Delsym, alla setlzer. The treatment provided mild relief.   OF note patient reports that her cough is keeping her up at night time.       Review of Systems   Constitutional: Positive for chills and malaise/fatigue. Negative for fever.   HENT: Positive for congestion, sinus pain, sinus pressure and sore throat. Negative for ear discharge and ear pain.         Pos. For ear pressure     Eyes: Negative for discharge and redness.   Respiratory: Positive for cough and sputum production. Negative for wheezing.    Gastrointestinal: Negative for abdominal pain, diarrhea and vomiting.   Genitourinary: Negative for dysuria and urgency.   Musculoskeletal: Negative for myalgias and neck pain.   Skin: Negative for itching and rash.   Neurological: Positive for headaches. Negative for dizziness and tingling.   All other systems reviewed and are negative.         Objective:     /88   Pulse 94   Temp 37.4 °C (99.3 °F)   Resp 18   Ht 1.626 m (5' 4\")   Wt 97.5 kg (215 lb)   SpO2 95%   BMI 36.90 kg/m²    PMH:  has a past medical history of Anesthesia; Heart palpitations; Hypertension; Hypothyroid; and Vitamin D deficiency.  MEDS:   Current Outpatient Prescriptions:   •  Dextromethorphan Polistirex (DELSYM PO), Take  by mouth., Disp: , Rfl:   •  doxycycline (VIBRAMYCIN) 100 MG Tab, Take 1 Tab by mouth 2 times a day for 5 days., Disp: 10 " Tab, Rfl: 0  •  fluticasone (FLONASE) 50 MCG/ACT nasal spray, Spray 1 Spray in nose every day., Disp: 16 g, Rfl: 0  •  guaifenesin-codeine (ROBITUSSIN AC) Solution oral solution, Take 5 mL by mouth every 6 hours as needed for Cough (May cause sedation, avoid while driving .) for up to 5 days., Disp: 100 mL, Rfl: 0  •  metoprolol SR (TOPROL XL) 100 MG TABLET SR 24 HR, Take 1 Tab by mouth every day., Disp: 90 Tab, Rfl: 3  •  losartan-hydrochlorothiazide (HYZAAR) 100-25 MG per tablet, Take 1 Tab by mouth every day., Disp: 90 Tab, Rfl: 3  •  levothyroxine (SYNTHROID) 25 MCG Tab, Take 1 Tab by mouth every day. Takes 88 mcg +25 mcg twice weekly, 88 mcg all other days. Dr. Taylor, Disp: 30 Tab, Rfl: 11  •  cyclosporin (RESTASIS) 0.05 % ophthalmic emulsion, Place 1 Drop in both eyes 2 times a day., Disp: , Rfl:   •  levothyroxine (SYNTHROID) 88 MCG TABS, Take 1 Tab by mouth every day., Disp: 90 Tab, Rfl: 1  ALLERGIES:   Allergies   Allergen Reactions   • Ace Inhibitors Cough     SURGHX:   Past Surgical History:   Procedure Laterality Date   • RECTOCELE REPAIR  3/15/2011    Performed by MARK RMAÍREZ at SURGERY SAME DAY [facility] ORS   • BIOPSY GENERAL  10/20/08    Performed by MARK RAMÍREZ at SURGERY SAME DAY [facility] ORS   • HYSTEROSCOPY WITH VIDEO OPERATIVE  10/20/08    Performed by MARK RAMÍREZ at SURGERY SAME DAY [facility] ORS   • GYN SURGERY         • BRIANNE BY LAPAROSCOPY       SOCHX:  reports that she has never smoked. She has never used smokeless tobacco. She reports that she drinks alcohol. She reports that she does not use drugs.  FH: Family history was reviewed, no pertinent findings to report    Physical Exam   Constitutional: She is oriented to person, place, and time. She appears well-developed and well-nourished.   HENT:   Head: Normocephalic and atraumatic.   Mouth/Throat: No oropharyngeal exudate.   Bilateral clear effusions- without bulge or erythema to the TM.   Posterior  oropharynx with pos. PND without tonsillar edema or erythema.   Nose- boggy turbinates with mild-moderate amount of nasal discharge. Bilateral maxillary sinus tenderness with percussion.    Eyes: Pupils are equal, round, and reactive to light. EOM are normal.   Neck: Normal range of motion. Neck supple.   Cardiovascular: Normal rate and regular rhythm.    Pulmonary/Chest: Effort normal and breath sounds normal. No respiratory distress. She has no wheezes.   Without egophony.      Musculoskeletal: Normal range of motion. She exhibits no edema.   Lymphadenopathy:     She has no cervical adenopathy.   Neurological: She is alert and oriented to person, place, and time.   Skin: Skin is warm. No rash noted.   Psychiatric: She has a normal mood and affect. Her behavior is normal.   Vitals reviewed.              Assessment/Plan:     1. Acute pansinusitis, recurrence not specified  - doxycycline (VIBRAMYCIN) 100 MG Tab; Take 1 Tab by mouth 2 times a day for 5 days.  Dispense: 10 Tab; Refill: 0  - fluticasone (FLONASE) 50 MCG/ACT nasal spray; Spray 1 Spray in nose every day.  Dispense: 16 g; Refill: 0    2. Cough  - doxycycline (VIBRAMYCIN) 100 MG Tab; Take 1 Tab by mouth 2 times a day for 5 days.  Dispense: 10 Tab; Refill: 0  - fluticasone (FLONASE) 50 MCG/ACT nasal spray; Spray 1 Spray in nose every day.  Dispense: 16 g; Refill: 0  - guaifenesin-codeine (ROBITUSSIN AC) Solution oral solution; Take 5 mL by mouth every 6 hours as needed for Cough (May cause sedation, avoid while driving .) for up to 5 days.  Dispense: 100 mL; Refill: 0    NARXCHECK was reviewed by myself-  Document does not reveal any concerning patterns. Pt. was advised to avoid the operation of heavy machine along with driving while on such medications. Finally pt. was advised to use medication only as prescribed.   Due to duration of symptoms, sinus tenderness, and failure of OTC therapies- ABX was written to tx. For bacterial etiology for sinusitis.    Continue OTC supportive therapies- Flonase, OTC allergy meds, avoid night time dairy. Increase fluids. Humidification.   Patient given precautionary s/sx that mandate immediate follow up and evaluation in the ED. Advised of risks of not doing so.    DDX, Supportive care, and indications for immediate follow-up discussed with patient.    Instructed to return to clinic or nearest emergency department if we are not available for any change in condition, further concerns, or worsening of symptoms.    The patient demonstrated a good understanding and agreed with the treatment plan

## 2018-12-28 DIAGNOSIS — I10 ESSENTIAL HYPERTENSION: ICD-10-CM

## 2018-12-28 RX ORDER — LOSARTAN POTASSIUM AND HYDROCHLOROTHIAZIDE 25; 100 MG/1; MG/1
1 TABLET ORAL DAILY
Qty: 90 TAB | Refills: 3 | Status: SHIPPED | OUTPATIENT
Start: 2018-12-28 | End: 2019-12-26

## 2019-01-11 ENCOUNTER — HOSPITAL ENCOUNTER (OUTPATIENT)
Dept: RADIOLOGY | Facility: MEDICAL CENTER | Age: 54
End: 2019-01-11
Attending: NURSE PRACTITIONER
Payer: COMMERCIAL

## 2019-01-11 DIAGNOSIS — R10.13 ABDOMINAL PAIN, EPIGASTRIC: ICD-10-CM

## 2019-01-11 DIAGNOSIS — R74.01 ALT (SGPT) LEVEL RAISED: ICD-10-CM

## 2019-01-11 PROCEDURE — 76700 US EXAM ABDOM COMPLETE: CPT

## 2019-02-01 DIAGNOSIS — I10 ESSENTIAL HYPERTENSION: Primary | ICD-10-CM

## 2019-02-01 RX ORDER — METOPROLOL SUCCINATE 100 MG/1
TABLET, EXTENDED RELEASE ORAL
Refills: 2 | Status: CANCELLED | OUTPATIENT
Start: 2019-02-01

## 2019-02-01 RX ORDER — METOPROLOL SUCCINATE 100 MG/1
100 TABLET, EXTENDED RELEASE ORAL
Qty: 90 TAB | Refills: 1 | Status: SHIPPED | OUTPATIENT
Start: 2019-02-01 | End: 2019-07-23 | Stop reason: SDUPTHER

## 2019-04-12 ENCOUNTER — APPOINTMENT (RX ONLY)
Dept: URBAN - METROPOLITAN AREA CLINIC 4 | Facility: CLINIC | Age: 54
Setting detail: DERMATOLOGY
End: 2019-04-12

## 2019-04-12 DIAGNOSIS — D485 NEOPLASM OF UNCERTAIN BEHAVIOR OF SKIN: ICD-10-CM

## 2019-04-12 DIAGNOSIS — L82.1 OTHER SEBORRHEIC KERATOSIS: ICD-10-CM

## 2019-04-12 DIAGNOSIS — D22 MELANOCYTIC NEVI: ICD-10-CM

## 2019-04-12 DIAGNOSIS — D18.0 HEMANGIOMA: ICD-10-CM

## 2019-04-12 DIAGNOSIS — L57.8 OTHER SKIN CHANGES DUE TO CHRONIC EXPOSURE TO NONIONIZING RADIATION: ICD-10-CM

## 2019-04-12 DIAGNOSIS — Z85.828 PERSONAL HISTORY OF OTHER MALIGNANT NEOPLASM OF SKIN: ICD-10-CM

## 2019-04-12 DIAGNOSIS — L81.4 OTHER MELANIN HYPERPIGMENTATION: ICD-10-CM

## 2019-04-12 DIAGNOSIS — L82.0 INFLAMED SEBORRHEIC KERATOSIS: ICD-10-CM

## 2019-04-12 PROBLEM — D18.01 HEMANGIOMA OF SKIN AND SUBCUTANEOUS TISSUE: Status: ACTIVE | Noted: 2019-04-12

## 2019-04-12 PROBLEM — D48.5 NEOPLASM OF UNCERTAIN BEHAVIOR OF SKIN: Status: ACTIVE | Noted: 2019-04-12

## 2019-04-12 PROBLEM — D22.5 MELANOCYTIC NEVI OF TRUNK: Status: ACTIVE | Noted: 2019-04-12

## 2019-04-12 PROCEDURE — ? COUNSELING

## 2019-04-12 PROCEDURE — 17110 DESTRUCTION B9 LES UP TO 14: CPT

## 2019-04-12 PROCEDURE — ? LIQUID NITROGEN

## 2019-04-12 PROCEDURE — 11102 TANGNTL BX SKIN SINGLE LES: CPT | Mod: 59

## 2019-04-12 PROCEDURE — 99214 OFFICE O/P EST MOD 30 MIN: CPT | Mod: 25

## 2019-04-12 PROCEDURE — ? BIOPSY BY SHAVE METHOD

## 2019-04-12 ASSESSMENT — LOCATION SIMPLE DESCRIPTION DERM
LOCATION SIMPLE: LEFT SUPERIOR EYELID
LOCATION SIMPLE: RIGHT ANTERIOR NECK
LOCATION SIMPLE: RIGHT HAND
LOCATION SIMPLE: RIGHT UPPER BACK
LOCATION SIMPLE: RIGHT CHEEK
LOCATION SIMPLE: LEFT UPPER BACK
LOCATION SIMPLE: LEFT HAND
LOCATION SIMPLE: CHEST

## 2019-04-12 ASSESSMENT — LOCATION ZONE DERM
LOCATION ZONE: TRUNK
LOCATION ZONE: NECK
LOCATION ZONE: FACE
LOCATION ZONE: EYELID
LOCATION ZONE: HAND

## 2019-04-12 ASSESSMENT — LOCATION DETAILED DESCRIPTION DERM
LOCATION DETAILED: RIGHT SUPERIOR UPPER BACK
LOCATION DETAILED: LEFT MEDIAL SUPERIOR CHEST
LOCATION DETAILED: RIGHT RADIAL DORSAL HAND
LOCATION DETAILED: RIGHT INFERIOR MEDIAL UPPER BACK
LOCATION DETAILED: LEFT MEDIAL SUPERIOR EYELID
LOCATION DETAILED: LEFT SUPERIOR UPPER BACK
LOCATION DETAILED: LEFT RADIAL DORSAL HAND
LOCATION DETAILED: RIGHT INFERIOR ANTERIOR NECK
LOCATION DETAILED: RIGHT INFERIOR CENTRAL MALAR CHEEK

## 2019-04-12 NOTE — PROCEDURE: BIOPSY BY SHAVE METHOD
Consent: Written consent was obtained and risks were reviewed including but not limited to scarring, infection, bleeding, scabbing, incomplete removal, nerve damage and allergy to anesthesia.
Curettage Text: The wound bed was treated with curettage after the biopsy was performed.
Size Of Lesion In Cm: 0.5
Lab: [id number]
Biopsy Method: Dermablade
Bill For Surgical Tray: no
Notification Instructions: Patient will be notified of biopsy results. However, patient instructed to call the office if not contacted within 2 weeks.
Wound Care: Bacitracin
Lab Facility: 
Anesthesia Type: 1% lidocaine with epinephrine
Additional Anesthesia Volume In Cc (Will Not Render If 0): 0
Cryotherapy Text: The wound bed was treated with cryotherapy after the biopsy was performed.
Depth Of Biopsy: dermis
Type Of Destruction Used: Electrodesiccation
Was A Bandage Applied: Yes
Billing Type: Third-Party Bill
Biopsy Type: H and E
Electrodesiccation Text: The wound bed was treated with electrodesiccation after the biopsy was performed.
Hemostasis: Drysol
Silver Nitrate Text: The wound bed was treated with silver nitrate after the biopsy was performed.
Dressing: bandage
Post-Care Instructions: I reviewed with the patient in detail post-care instructions. Patient is to keep the biopsy site dry overnight, and then apply bacitracin twice daily until healed. Patient may apply hydrogen peroxide soaks to remove any crusting.
Electrodesiccation And Curettage Text: The wound bed was treated with electrodesiccation and curettage after the biopsy was performed.
Detail Level: Detailed

## 2019-04-12 NOTE — PROCEDURE: LIQUID NITROGEN
Include Z78.9 (Other Specified Conditions Influencing Health Status) As An Associated Diagnosis?: No
Medical Necessity Clause: This procedure was medically necessary because the lesions that were treated were:
Medical Necessity Information: It is in your best interest to select a reason for this procedure from the list below. All of these items fulfill various CMS LCD requirements except the new and changing color options.
Post-Care Instructions: I reviewed with the patient in detail post-care instructions. Patient is to wear sunprotection, and avoid picking at any of the treated lesions. Pt may apply Vaseline to crusted or scabbing areas.
Number Of Freeze-Thaw Cycles: 1 freeze-thaw cycle
Duration Of Freeze Thaw-Cycle (Seconds): 3
Aperture Size (Optional): C
Consent: The patient's consent was obtained including but not limited to risks of crusting, scabbing, blistering, scarring, darker or lighter pigmentary change, recurrence, incomplete removal and infection.
Detail Level: Detailed

## 2019-07-23 DIAGNOSIS — I10 ESSENTIAL HYPERTENSION: ICD-10-CM

## 2019-07-24 RX ORDER — METOPROLOL SUCCINATE 100 MG/1
TABLET, EXTENDED RELEASE ORAL
Qty: 90 TAB | Refills: 1 | Status: SHIPPED | OUTPATIENT
Start: 2019-07-24 | End: 2020-01-30 | Stop reason: SDUPTHER

## 2019-12-26 DIAGNOSIS — I10 ESSENTIAL HYPERTENSION: ICD-10-CM

## 2019-12-26 RX ORDER — LOSARTAN POTASSIUM AND HYDROCHLOROTHIAZIDE 25; 100 MG/1; MG/1
1 TABLET ORAL DAILY
Qty: 90 TAB | Refills: 0 | Status: SHIPPED | OUTPATIENT
Start: 2019-12-26 | End: 2020-03-22

## 2020-01-06 ENCOUNTER — OFFICE VISIT (OUTPATIENT)
Dept: CARDIOLOGY | Facility: MEDICAL CENTER | Age: 55
End: 2020-01-06
Payer: COMMERCIAL

## 2020-01-06 VITALS
SYSTOLIC BLOOD PRESSURE: 130 MMHG | WEIGHT: 211 LBS | HEIGHT: 65 IN | HEART RATE: 68 BPM | DIASTOLIC BLOOD PRESSURE: 80 MMHG | BODY MASS INDEX: 35.16 KG/M2 | OXYGEN SATURATION: 96 %

## 2020-01-06 DIAGNOSIS — I10 ESSENTIAL HYPERTENSION: ICD-10-CM

## 2020-01-06 DIAGNOSIS — E78.2 MIXED HYPERLIPIDEMIA: ICD-10-CM

## 2020-01-06 DIAGNOSIS — Z91.89 OTHER SPECIFIED PERSONAL RISK FACTORS, NOT ELSEWHERE CLASSIFIED: ICD-10-CM

## 2020-01-06 DIAGNOSIS — I47.10 SVT (SUPRAVENTRICULAR TACHYCARDIA) (HCC): ICD-10-CM

## 2020-01-06 PROCEDURE — 99213 OFFICE O/P EST LOW 20 MIN: CPT | Performed by: INTERNAL MEDICINE

## 2020-01-06 RX ORDER — LEVOTHYROXINE SODIUM 0.1 MG/1
100 TABLET ORAL
COMMUNITY
End: 2020-09-15

## 2020-01-06 ASSESSMENT — ENCOUNTER SYMPTOMS
CONSTITUTIONAL NEGATIVE: 1
GASTROINTESTINAL NEGATIVE: 1
MUSCULOSKELETAL NEGATIVE: 1
RESPIRATORY NEGATIVE: 1
CARDIOVASCULAR NEGATIVE: 1
NEUROLOGICAL NEGATIVE: 1

## 2020-01-07 NOTE — PROGRESS NOTES
Chief Complaint   Patient presents with   • Palpitations       Subjective:   Clary Bah is a 54 y.o. female who presents today for follow-up of hypertension, SVT and mixed hyperlipidemia.  She recently underwent a wellness clinic at work and lab reveals HDL of 36, LDL of 126 and non-HDL cholesterol 154.  She is had no sense of palpitations.  The patient is not been too physically active.  Her glycohemoglobin is up to 5.7 and she is gained a few pounds over the last year.  From cardiac standpoint she has had no sense of palpitations, chest pain or exertional dyspnea and no edema.    Past Medical History:   Diagnosis Date   • Anesthesia     slow to wake up   • Heart palpitations    • Hypertension    • Hypothyroid    • Vitamin D deficiency      Past Surgical History:   Procedure Laterality Date   • RECTOCELE REPAIR  3/15/2011    Performed by MARK RAMÍREZ at SURGERY SAME DAY [facility] ORS   • BIOPSY GENERAL  10/20/08    Performed by MARK RAMÍREZ at SURGERY SAME DAY [facility] ORS   • HYSTEROSCOPY WITH VIDEO OPERATIVE  10/20/08    Performed by MARK RAMÍREZ at SURGERY SAME DAY [facility] ORS   • GYN SURGERY         • BRIANNE BY LAPAROSCOPY       Family History   Problem Relation Age of Onset   • Arthritis Mother    • Hypertension Mother    • Heart Disease Father 67        heart attack   • Diabetes Father    • Hypertension Sister      Social History     Socioeconomic History   • Marital status: Single     Spouse name: Not on file   • Number of children: Not on file   • Years of education: Not on file   • Highest education level: Not on file   Occupational History   • Not on file   Social Needs   • Financial resource strain: Not on file   • Food insecurity:     Worry: Not on file     Inability: Not on file   • Transportation needs:     Medical: Not on file     Non-medical: Not on file   Tobacco Use   • Smoking status: Never Smoker   • Smokeless tobacco: Never Used   Substance and Sexual Activity    • Alcohol use: Yes     Comment: social rare   • Drug use: No   • Sexual activity: Yes     Partners: Male     Comment: vasectomy   Lifestyle   • Physical activity:     Days per week: Not on file     Minutes per session: Not on file   • Stress: Not on file   Relationships   • Social connections:     Talks on phone: Not on file     Gets together: Not on file     Attends [employer] service: Not on file     Active member of club or organization: Not on file     Attends meetings of clubs or organizations: Not on file     Relationship status: Not on file   • Intimate partner violence:     Fear of current or ex partner: Not on file     Emotionally abused: Not on file     Physically abused: Not on file     Forced sexual activity: Not on file   Other Topics Concern   • Not on file   Social History Narrative   • Not on file     Allergies   Allergen Reactions   • Ace Inhibitors Cough     Outpatient Encounter Medications as of 1/6/2020   Medication Sig Dispense Refill   • levothyroxine (SYNTHROID) 100 MCG Tab Take 100 mcg by mouth Every morning on an empty stomach.     • losartan-hydrochlorothiazide (HYZAAR) 100-25 MG per tablet Take 1 Tab by mouth every day. 90 Tab 0   • metoprolol SR (TOPROL XL) 100 MG TABLET SR 24 HR take 1 tablet by mouth everyday 90 Tab 1   • fluticasone (FLONASE) 50 MCG/ACT nasal spray Spray 1 Spray in nose every day. 16 g 0   • cyclosporin (RESTASIS) 0.05 % ophthalmic emulsion Place 1 Drop in both eyes 2 times a day.     • Dextromethorphan Polistirex (DELSYM PO) Take  by mouth.     • levothyroxine (SYNTHROID) 25 MCG Tab Take 1 Tab by mouth every day. Takes 88 mcg +25 mcg twice weekly, 88 mcg all other days. Dr. Taylor (Patient not taking: Reported on 1/6/2020) 30 Tab 11   • levothyroxine (SYNTHROID) 88 MCG TABS Take 1 Tab by mouth every day. (Patient not taking: Reported on 1/6/2020) 90 Tab 1     No facility-administered encounter medications on file as of 1/6/2020.      Review of Systems  "  Constitutional: Negative.    HENT: Negative.    Respiratory: Negative.    Cardiovascular: Negative.    Gastrointestinal: Negative.    Musculoskeletal: Negative.    Skin: Negative.    Neurological: Negative.    Endo/Heme/Allergies: Negative.         Objective:   /80 (BP Location: Left arm, Patient Position: Sitting, BP Cuff Size: Adult)   Pulse 68   Ht 1.651 m (5' 5\")   Wt 95.7 kg (211 lb)   SpO2 96%   BMI 35.11 kg/m²     Physical Exam   Constitutional: She is oriented to person, place, and time. No distress.   HENT:   Head: Normocephalic and atraumatic.   Eyes: Pupils are equal, round, and reactive to light. No scleral icterus.   Neck: No JVD present.   Cardiovascular: Normal rate and regular rhythm.   No murmur heard.  Pulmonary/Chest: No respiratory distress. She has no wheezes.   Abdominal: Soft. She exhibits no distension. There is no tenderness.   Musculoskeletal:         General: No edema.   Neurological: She is alert and oriented to person, place, and time.   Skin: Skin is warm.   Psychiatric: She has a normal mood and affect.       Assessment:     1. SVT (supraventricular tachycardia) (HCC)     2. Essential hypertension     3. Mixed hyperlipidemia     4. Other specified personal risk factors, not elsewhere classified  CT-CARDIAC SCORING       Medical Decision Making:  Today's Assessment / Status / Plan:   Hypertension: Under good control on current medications her blood pressure by my reading is 124/80 right arm sitting using a large cuff.    PSVT: Totally suppressed on current dose of metoprolol    Mixed hyperlipidemia: Labs reviewed with patient.  Non-HDL cholesterol is elevated.  She is concerned about long-term risk.  Recommend she have a coronary calcification study and will be notified of the results.  Otherwise return in 1 year  "

## 2020-01-24 ENCOUNTER — HOSPITAL ENCOUNTER (OUTPATIENT)
Dept: RADIOLOGY | Facility: MEDICAL CENTER | Age: 55
End: 2020-01-24
Attending: INTERNAL MEDICINE
Payer: COMMERCIAL

## 2020-01-24 DIAGNOSIS — Z91.89 OTHER SPECIFIED PERSONAL RISK FACTORS, NOT ELSEWHERE CLASSIFIED: ICD-10-CM

## 2020-01-24 PROCEDURE — 4410556 CT-CARDIAC SCORING

## 2020-01-28 ENCOUNTER — TELEPHONE (OUTPATIENT)
Dept: CARDIOLOGY | Facility: MEDICAL CENTER | Age: 55
End: 2020-01-28

## 2020-01-28 NOTE — TELEPHONE ENCOUNTER
----- Message from Earl Baeza M.D. sent at 1/28/2020  8:45 AM PST -----  CT coronary calcium score is 0.  This puts her in a very low risk group for heart attacks and strokes.  Recommend she have this repeated in about 5 years.  No need for statins based on this scan.  This subject was reviewed 1 year ago in our journal and found that in patients with a score of 0 that statins did not provide any protection.

## 2020-01-30 DIAGNOSIS — I10 ESSENTIAL HYPERTENSION: ICD-10-CM

## 2020-01-30 RX ORDER — METOPROLOL SUCCINATE 100 MG/1
100 TABLET, EXTENDED RELEASE ORAL
Qty: 90 TAB | Refills: 3 | Status: SHIPPED | OUTPATIENT
Start: 2020-01-30 | End: 2021-01-25

## 2020-03-05 ENCOUNTER — HOSPITAL ENCOUNTER (OUTPATIENT)
Dept: RADIOLOGY | Facility: MEDICAL CENTER | Age: 55
End: 2020-03-05
Attending: SPECIALIST
Payer: COMMERCIAL

## 2020-03-05 DIAGNOSIS — Z12.31 VISIT FOR SCREENING MAMMOGRAM: ICD-10-CM

## 2020-03-05 PROCEDURE — 77067 SCR MAMMO BI INCL CAD: CPT | Mod: 50

## 2020-03-20 DIAGNOSIS — I10 ESSENTIAL HYPERTENSION: ICD-10-CM

## 2020-03-23 RX ORDER — LOSARTAN POTASSIUM AND HYDROCHLOROTHIAZIDE 25; 100 MG/1; MG/1
TABLET ORAL
Qty: 90 TAB | Refills: 3 | Status: SHIPPED | OUTPATIENT
Start: 2020-03-23 | End: 2021-03-16

## 2020-05-26 ENCOUNTER — APPOINTMENT (RX ONLY)
Dept: URBAN - METROPOLITAN AREA CLINIC 4 | Facility: CLINIC | Age: 55
Setting detail: DERMATOLOGY
End: 2020-05-26

## 2020-05-26 DIAGNOSIS — D22 MELANOCYTIC NEVI: ICD-10-CM

## 2020-05-26 DIAGNOSIS — Z85.828 PERSONAL HISTORY OF OTHER MALIGNANT NEOPLASM OF SKIN: ICD-10-CM

## 2020-05-26 DIAGNOSIS — D18.0 HEMANGIOMA: ICD-10-CM

## 2020-05-26 DIAGNOSIS — L81.4 OTHER MELANIN HYPERPIGMENTATION: ICD-10-CM

## 2020-05-26 DIAGNOSIS — L82.1 OTHER SEBORRHEIC KERATOSIS: ICD-10-CM

## 2020-05-26 DIAGNOSIS — L57.8 OTHER SKIN CHANGES DUE TO CHRONIC EXPOSURE TO NONIONIZING RADIATION: ICD-10-CM

## 2020-05-26 PROBLEM — D22.5 MELANOCYTIC NEVI OF TRUNK: Status: ACTIVE | Noted: 2020-05-26

## 2020-05-26 PROBLEM — D18.01 HEMANGIOMA OF SKIN AND SUBCUTANEOUS TISSUE: Status: ACTIVE | Noted: 2020-05-26

## 2020-05-26 PROCEDURE — ? COUNSELING

## 2020-05-26 PROCEDURE — 99213 OFFICE O/P EST LOW 20 MIN: CPT

## 2020-05-26 ASSESSMENT — LOCATION DETAILED DESCRIPTION DERM
LOCATION DETAILED: LEFT RADIAL DORSAL HAND
LOCATION DETAILED: LEFT SUPERIOR UPPER BACK
LOCATION DETAILED: RIGHT INFERIOR MEDIAL UPPER BACK
LOCATION DETAILED: RIGHT INFERIOR ANTERIOR NECK
LOCATION DETAILED: RIGHT INFERIOR CENTRAL MALAR CHEEK
LOCATION DETAILED: RIGHT SUPERIOR UPPER BACK
LOCATION DETAILED: RIGHT RADIAL DORSAL HAND

## 2020-05-26 ASSESSMENT — LOCATION ZONE DERM
LOCATION ZONE: FACE
LOCATION ZONE: HAND
LOCATION ZONE: NECK
LOCATION ZONE: TRUNK

## 2020-05-26 ASSESSMENT — LOCATION SIMPLE DESCRIPTION DERM
LOCATION SIMPLE: LEFT UPPER BACK
LOCATION SIMPLE: RIGHT UPPER BACK
LOCATION SIMPLE: RIGHT ANTERIOR NECK
LOCATION SIMPLE: LEFT HAND
LOCATION SIMPLE: RIGHT HAND
LOCATION SIMPLE: RIGHT CHEEK

## 2020-08-04 ENCOUNTER — TELEPHONE (OUTPATIENT)
Dept: ENDOCRINOLOGY | Facility: MEDICAL CENTER | Age: 55
End: 2020-08-04

## 2020-08-04 NOTE — TELEPHONE ENCOUNTER
Patient is requesting a refill of levothyroxine 100 mcg oncee per a day. Dr. Taylor will no longer refill the medication without an appointment to see her. Dr. Taylor's office does not take patient's insurance. She was hoping that Dr. Byrd would give her a prescription to get her to her appointment on 9/15. Patient states she has about 2 weeks left of medication so if Dr. Byrd would approve her coming in for an early appointment then he would not need to refill her medication until she comes in to see him.

## 2020-08-07 DIAGNOSIS — E03.9 HYPOTHYROIDISM, UNSPECIFIED TYPE: ICD-10-CM

## 2020-08-07 RX ORDER — LEVOTHYROXINE SODIUM 0.1 MG/1
100 TABLET ORAL
Qty: 30 TAB | Refills: 1 | Status: SHIPPED | OUTPATIENT
Start: 2020-08-07 | End: 2020-10-19

## 2020-09-08 DIAGNOSIS — E03.9 HYPOTHYROIDISM, UNSPECIFIED TYPE: ICD-10-CM

## 2020-09-15 ENCOUNTER — OFFICE VISIT (OUTPATIENT)
Dept: ENDOCRINOLOGY | Facility: MEDICAL CENTER | Age: 55
End: 2020-09-15
Attending: INTERNAL MEDICINE
Payer: COMMERCIAL

## 2020-09-15 VITALS
HEIGHT: 65 IN | HEART RATE: 71 BPM | DIASTOLIC BLOOD PRESSURE: 84 MMHG | BODY MASS INDEX: 35.16 KG/M2 | OXYGEN SATURATION: 97 % | WEIGHT: 211 LBS | SYSTOLIC BLOOD PRESSURE: 134 MMHG

## 2020-09-15 DIAGNOSIS — E03.9 HYPOTHYROIDISM, UNSPECIFIED TYPE: ICD-10-CM

## 2020-09-15 PROCEDURE — 99211 OFF/OP EST MAY X REQ PHY/QHP: CPT | Performed by: INTERNAL MEDICINE

## 2020-09-15 PROCEDURE — 99201 HCHG OFFICE VISIT-NEW-LEVEL 1: CPT | Performed by: INTERNAL MEDICINE

## 2020-09-15 PROCEDURE — 99204 OFFICE O/P NEW MOD 45 MIN: CPT | Performed by: INTERNAL MEDICINE

## 2020-09-16 NOTE — PROGRESS NOTES
Chief Complaint   Patient presents with   • Hypothyroidism     ?  Etiology      CC     Endocrine evaluation and continued care requested by Dr. Consuelo Taylor for  management of hypothyroidism due to insurance change.    HPI:    Patient's hypothyroidism dates back to 1996.  The exact etiology is unclear.  I do have Dr. Taylor's notes that does not indicate any previous surgery or diagnoses such as thyroiditis.  She has done thyroid ultrasounds which apparently do not reflect thyroiditis or at least has not made that diagnosis.         She has been on slowly increasing doses of levothyroxine and is currently euthyroid taking 100 mcg daily.        The gland is not enlarged clinically.  It has become somewhat symptomatic and swollen on 2 occasions over the years of unclear etiology.  No specific treatment and seems to have spontaneously.    ROS:  All other systems reported as negative       Allergies:   Allergies   Allergen Reactions   • Ace Inhibitors Cough   • Atorvastatin      Statin not indicted with coronary calcium score of 0.       Current medicines including changes today:  Current Outpatient Medications   Medication Sig Dispense Refill   • levothyroxine (SYNTHROID) 100 MCG Tab Take 1 Tab by mouth Every morning on an empty stomach. (Patient not taking: Reported on 9/15/2020) 30 Tab 1   • losartan-hydrochlorothiazide (HYZAAR) 100-25 MG per tablet TAKE ONE TABLET BY MOUTH ONE TIME DAILY  90 Tab 3   • metoprolol SR (TOPROL XL) 100 MG TABLET SR 24 HR Take 1 Tab by mouth every day. 90 Tab 3   • Dextromethorphan Polistirex (DELSYM PO) Take  by mouth.     • fluticasone (FLONASE) 50 MCG/ACT nasal spray Spray 1 Spray in nose every day. (Patient not taking: Reported on 9/15/2020) 16 g 0   • cyclosporin (RESTASIS) 0.05 % ophthalmic emulsion Place 1 Drop in both eyes 2 times a day.       No current facility-administered medications for this visit.         Past Medical History:   Diagnosis Date   • Anesthesia     slow to wake  "up   • Heart palpitations    • Hypertension    • Hypothyroid    • Vitamin D deficiency      Family history          No family history of thyroid disease    Social history         Patient is .         She works as a  for [employer].  Works from home.         She does not smoke cigarettes or drink alcohol and does not use recreational or illegal drugs    PHYSICAL EXAM:    /84 (BP Location: Left arm, Patient Position: Sitting, BP Cuff Size: Adult)   Pulse 71   Ht 1.651 m (5' 5\")   Wt 95.7 kg (211 lb)   SpO2 97%   BMI 35.11 kg/m²     Gen.   appears healthy     Skin   appropriate for sex and age    HEENT  unremarkable    Neck   thyroid gland is about normal size.  No palpable nodules.  Nontender.  No satellite nodules elsewhere in the neck or supraclavicular areas    Heart  regular    Extremities  no edema    Neuro  gait and station normal    Psych  appropriate    ASSESSMENT AND RECOMMENDATIONS    1. Hypothyroidism, unspecified type               Appears to be clinically euthyroid taking levothyroxine 100 mcg/day.               We will update lab and discuss via my chart or telephone.       DISPOSITION: This seems to be a relatively routine and stable case.                             I suggested to the patient that her PCP Dr. Shaneka Olsen is capable of managing her follow-up.  She will discussed that at a later time.      Gama Byrd M.D.    Copies to: Shaneka Olsen M.D. [phone]  "

## 2020-10-17 DIAGNOSIS — E03.9 HYPOTHYROIDISM, UNSPECIFIED TYPE: ICD-10-CM

## 2020-10-19 DIAGNOSIS — E03.9 HYPOTHYROIDISM, UNSPECIFIED TYPE: ICD-10-CM

## 2020-10-19 RX ORDER — LEVOTHYROXINE SODIUM 0.1 MG/1
TABLET ORAL
Qty: 30 TAB | Refills: 0 | Status: SHIPPED | OUTPATIENT
Start: 2020-10-19 | End: 2020-10-19 | Stop reason: SDUPTHER

## 2020-10-19 NOTE — TELEPHONE ENCOUNTER
Received request via: Pharmacy    Was the patient seen in the last year in this department? Yes    Does the patient have an active prescription (recently filled or refills available) for medication(s) requested? No     Levothyroxine Sodium Oral Tablet 100 MCG        Will file in chart as: levothyroxine (SYNTHROID) 100 MCG Tab   Sig: TAKE ONE TABLET BY MOUTH IN THE MORNING ON AN EMPTY STOMACH   Disp:  30 Tab    Refills:  0   Start: 10/17/2020   Class: Normal   Non-formulary For: Hypothyroidism, unspecified type   Last ordered: 2 months ago by Gama Byrd M.D. Last refill: 9/9/2020   Rx #: [id number]

## 2020-10-20 RX ORDER — LEVOTHYROXINE SODIUM 0.1 MG/1
100 TABLET ORAL
Qty: 30 TAB | Refills: 5 | Status: SHIPPED | OUTPATIENT
Start: 2020-10-20 | End: 2021-05-18

## 2021-01-24 DIAGNOSIS — I10 ESSENTIAL HYPERTENSION: ICD-10-CM

## 2021-01-25 RX ORDER — METOPROLOL SUCCINATE 100 MG/1
TABLET, EXTENDED RELEASE ORAL
Qty: 90 TAB | Refills: 0 | Status: SHIPPED | OUTPATIENT
Start: 2021-01-25 | End: 2021-04-27 | Stop reason: SDUPTHER

## 2021-03-11 ENCOUNTER — HOSPITAL ENCOUNTER (OUTPATIENT)
Dept: LAB | Facility: MEDICAL CENTER | Age: 56
End: 2021-03-11
Attending: INTERNAL MEDICINE
Payer: COMMERCIAL

## 2021-03-11 DIAGNOSIS — E03.9 HYPOTHYROIDISM, UNSPECIFIED TYPE: ICD-10-CM

## 2021-03-11 LAB
T4 FREE SERPL-MCNC: 1.45 NG/DL (ref 0.93–1.7)
THYROPEROXIDASE AB SERPL-ACNC: 20 IU/ML (ref 0–9)
TSH SERPL DL<=0.005 MIU/L-ACNC: 1.63 UIU/ML (ref 0.38–5.33)

## 2021-03-11 PROCEDURE — 86800 THYROGLOBULIN ANTIBODY: CPT

## 2021-03-11 PROCEDURE — 36415 COLL VENOUS BLD VENIPUNCTURE: CPT

## 2021-03-11 PROCEDURE — 86376 MICROSOMAL ANTIBODY EACH: CPT

## 2021-03-11 PROCEDURE — 84439 ASSAY OF FREE THYROXINE: CPT

## 2021-03-11 PROCEDURE — 84443 ASSAY THYROID STIM HORMONE: CPT

## 2021-03-13 LAB — THYROGLOB AB SERPL-ACNC: 12.1 IU/ML (ref 0–4)

## 2021-03-15 ENCOUNTER — TELEMEDICINE (OUTPATIENT)
Dept: ENDOCRINOLOGY | Facility: MEDICAL CENTER | Age: 56
End: 2021-03-15
Attending: INTERNAL MEDICINE
Payer: COMMERCIAL

## 2021-03-15 DIAGNOSIS — E06.3 HYPOTHYROIDISM, ACQUIRED, AUTOIMMUNE: ICD-10-CM

## 2021-03-15 DIAGNOSIS — E06.3 CHRONIC AUTOIMMUNE THYROIDITIS: ICD-10-CM

## 2021-03-15 PROCEDURE — 99214 OFFICE O/P EST MOD 30 MIN: CPT | Mod: 95,CR | Performed by: INTERNAL MEDICINE

## 2021-03-15 NOTE — PROGRESS NOTES
Telemedicine Visit: Established Patient     This encounter was conducted via ZOOM.   Verbal consent was obtained. Patient's identity was verified.    Subjective:   CC:   Clary Bah is a 55 y.o. female presenting for evaluation and management of hypothyroidism    HPI      Patient has positive TPO antibodies in 20 (0-9) and antithyroglobulin antibodies 12.1 (0-4).  Thyroid gland is asymptomatic. Patient is not aware of any change in her gland. No discomfort. No difficulty swallowing, breathing, or voice change.         Dr. Taylor had previously done an ultrasound showing no nodules and no other abnormalities.         I discussed chronic autoimmune thyroiditis indicating its the most common cause of hypothyroidism women.  It ordinarily is a benign condition and that seems to be the case now.  The condition may be slowly progressing such that thyroid enlargement may occur later and progressive hypothyroidism necessitating and needing thyroid dose adjustment.  On rare occasion it may become hypothyroid.  In any event it requires some monitoring over time at least annually.    ROS   Denies any recent fevers or chills. No nausea or vomiting. No chest pains or shortness of breath.   No COVID-19 symptoms.    Allergies   Allergen Reactions   • Ace Inhibitors Cough   • Atorvastatin      Statin not indicted with coronary calcium score of 0.       Current medicines (including changes today)  Current Outpatient Medications   Medication Sig Dispense Refill   • metoprolol SR (TOPROL XL) 100 MG TABLET SR 24 HR TAKE ONE TABLET BY MOUTH ONE TIME DAILY  90 Tab 0   • losartan-hydrochlorothiazide (HYZAAR) 100-25 MG per tablet TAKE ONE TABLET BY MOUTH ONE TIME DAILY  90 Tab 3   • Dextromethorphan Polistirex (DELSYM PO) Take  by mouth.     • fluticasone (FLONASE) 50 MCG/ACT nasal spray Spray 1 Spray in nose every day. (Patient not taking: Reported on 9/15/2020) 16 g 0   • cyclosporin (RESTASIS) 0.05 % ophthalmic emulsion Place 1 Drop  in both eyes 2 times a day.       No current facility-administered medications for this visit.       Patient Active Problem List    Diagnosis Date Noted   • Chronic autoimmune thyroiditis 03/15/2021   • Hypothyroidism, acquired, autoimmune 03/15/2021   • SVT (supraventricular tachycardia) (HCC) 03/08/2016   • Mixed hyperlipidemia 01/25/2016   • Tachycardia 01/25/2016   • Low back pain radiating to right leg 08/05/2013   • Heart palpitations    • Essential hypertension    • Hypothyroid        Family History   Problem Relation Age of Onset   • Arthritis Mother    • Hypertension Mother    • Heart Disease Father 67        heart attack   • Diabetes Father    • Hypertension Sister        She  has a past medical history of Anesthesia, Heart palpitations, Hypertension, Hypothyroid, and Vitamin D deficiency.  She  has a past surgical history that includes biopsy general (10/20/08); aaron by laparoscopy; hysteroscopy with video operative (10/20/08); gyn surgery (1995); and rectocele repair (3/15/2011).       Objective:   Vitals obtained by patient:     None    Physical Exam:  Constitutional: Alert, no distress, well-groomed.  Skin: No rashes in visible areas.  Eye: Unremarkable  ENMT:  Phonation normal.  Neck: No obvious thyroid enlargement  CV: Pulse as reported by patient unremarkable  Respiratory: Unlabored respiratory effort, no cough or audible wheeze  Psych: Alert and oriented x3, normal affect and mood.       Assessment and Plan:   The following treatment plan was discussed:     1. Chronic autoimmune thyroiditis                 Gland is asymptomatic and relatively small    2. Hypothyroidism, acquired, autoimmune                Clinically euthyroid taking levothyroxine 100 mcg daily.  No dose change indicated      Follow-up: Follow-up lab and examination in 6 months

## 2021-03-16 DIAGNOSIS — I10 ESSENTIAL HYPERTENSION: ICD-10-CM

## 2021-03-16 RX ORDER — LOSARTAN POTASSIUM AND HYDROCHLOROTHIAZIDE 25; 100 MG/1; MG/1
TABLET ORAL
Qty: 90 TABLET | Refills: 0 | Status: SHIPPED | OUTPATIENT
Start: 2021-03-16 | End: 2021-04-27 | Stop reason: SDUPTHER

## 2021-04-22 ENCOUNTER — HOSPITAL ENCOUNTER (OUTPATIENT)
Dept: RADIOLOGY | Facility: MEDICAL CENTER | Age: 56
End: 2021-04-22
Attending: SPECIALIST
Payer: COMMERCIAL

## 2021-04-22 DIAGNOSIS — Z12.31 VISIT FOR SCREENING MAMMOGRAM: ICD-10-CM

## 2021-04-22 PROCEDURE — 77063 BREAST TOMOSYNTHESIS BI: CPT

## 2021-04-27 ENCOUNTER — OFFICE VISIT (OUTPATIENT)
Dept: CARDIOLOGY | Facility: MEDICAL CENTER | Age: 56
End: 2021-04-27
Payer: COMMERCIAL

## 2021-04-27 VITALS
DIASTOLIC BLOOD PRESSURE: 78 MMHG | SYSTOLIC BLOOD PRESSURE: 120 MMHG | HEIGHT: 65 IN | HEART RATE: 68 BPM | WEIGHT: 200 LBS | BODY MASS INDEX: 33.32 KG/M2 | OXYGEN SATURATION: 97 %

## 2021-04-27 DIAGNOSIS — I10 ESSENTIAL HYPERTENSION: ICD-10-CM

## 2021-04-27 DIAGNOSIS — I47.10 SVT (SUPRAVENTRICULAR TACHYCARDIA) (HCC): ICD-10-CM

## 2021-04-27 DIAGNOSIS — E78.2 MIXED HYPERLIPIDEMIA: ICD-10-CM

## 2021-04-27 DIAGNOSIS — R00.2 PALPITATIONS: ICD-10-CM

## 2021-04-27 PROCEDURE — 99213 OFFICE O/P EST LOW 20 MIN: CPT | Performed by: INTERNAL MEDICINE

## 2021-04-27 RX ORDER — LEVOTHYROXINE SODIUM 100 UG/1
CAPSULE ORAL
COMMUNITY
End: 2022-05-31

## 2021-04-27 RX ORDER — LOSARTAN POTASSIUM AND HYDROCHLOROTHIAZIDE 25; 100 MG/1; MG/1
1 TABLET ORAL DAILY
Qty: 90 TABLET | Refills: 3 | Status: SHIPPED | OUTPATIENT
Start: 2021-04-27 | End: 2022-06-16 | Stop reason: SDUPTHER

## 2021-04-27 RX ORDER — METOPROLOL SUCCINATE 100 MG/1
100 TABLET, EXTENDED RELEASE ORAL DAILY
Qty: 90 TABLET | Refills: 3 | Status: SHIPPED | OUTPATIENT
Start: 2021-04-27 | End: 2022-04-28 | Stop reason: SDUPTHER

## 2021-04-27 ASSESSMENT — ENCOUNTER SYMPTOMS
MUSCULOSKELETAL NEGATIVE: 1
NEUROLOGICAL NEGATIVE: 1
GASTROINTESTINAL NEGATIVE: 1
WEIGHT LOSS: 1
RESPIRATORY NEGATIVE: 1
CARDIOVASCULAR NEGATIVE: 1

## 2021-04-27 NOTE — PROGRESS NOTES
Chief Complaint   Patient presents with   • Supraventricular Tachycardia (SVT)   • Palpitations   • Hyperlipidemia       Subjective:   Sarika Bah is a 55 y.o. female who presents today for follow-up of PSVT, hypertension and hyperlipidemia.  Over the last year, her PSVT episodes have been completely controlled with  metoprolol.  She had a CT coronary calcification scan last January with a score of 0 which is very reassuring.  Patient has not been exercising much.  No other interval problems.  She has history of autoimmune thyroiditis.    Past Medical History:   Diagnosis Date   • Anesthesia     slow to wake up   • Heart palpitations    • Hypertension    • Hypothyroid    • Vitamin D deficiency      Past Surgical History:   Procedure Laterality Date   • RECTOCELE REPAIR  3/15/2011    Performed by MARK [facility]   • BIOPSY GENERAL  10/20/08    Performed by MARK [facility]   • HYSTEROSCOPY WITH VIDEO OPERATIVE  10/20/08    Performed by MARK [facility]RE[facility]   • GYN SURGERY         • BRIANNE BY LAPAROSCOPY       Family History   Problem Relation Age of Onset   • Arthritis Mother    • Hypertension Mother    • Heart Disease Father 67        heart attack   • Diabetes Father    • Hypertension Sister      Social History     Socioeconomic History   • Marital status:      Spouse name: Not on file   • Number of children: Not on file   • Years of education: Not on file   • Highest education level: Not on file   Occupational History   • Not on file   Tobacco Use   • Smoking status: Never Smoker   • Smokeless tobacco: Never Used   Substance and Sexual Activity   • Alcohol use: Yes     Comment: social rare   • Drug use: No   • Sexual activity: Yes     Partners: Male     Comment: vasectomy   Other Topics Concern   • Not on file   Social History Narrative   • Not on file     Social Determinants of Health      Financial Resource Strain:    • Difficulty of Paying Living Expenses:    Food Insecurity:    • Worried About Running Out of Food in the Last Year:    • Ran Out of Food in the Last Year:    Transportation Needs:    • Lack of Transportation (Medical):    • Lack of Transportation (Non-Medical):    Physical Activity:    • Days of Exercise per Week:    • Minutes of Exercise per Session:    Stress:    • Feeling of Stress :    Social Connections:    • Frequency of Communication with Friends and Family:    • Frequency of Social Gatherings with Friends and Family:    • Attends Zoroastrianism Services:    • Active Member of Clubs or Organizations:    • Attends Club or Organization Meetings:    • Marital Status:    Intimate Partner Violence:    • Fear of Current or Ex-Partner:    • Emotionally Abused:    • Physically Abused:    • Sexually Abused:      Allergies   Allergen Reactions   • Ace Inhibitors Cough   • Atorvastatin      Statin not indicted with coronary calcium score of 0.     Outpatient Encounter Medications as of 4/27/2021   Medication Sig Dispense Refill   • Levothyroxine Sodium 100 MCG Cap Take  by mouth.     • losartan-hydrochlorothiazide (HYZAAR) 100-25 MG per tablet TAKE ONE TABLET BY MOUTH ONE TIME DAILY  90 tablet 0   • metoprolol SR (TOPROL XL) 100 MG TABLET SR 24 HR TAKE ONE TABLET BY MOUTH ONE TIME DAILY  90 Tab 0   • fluticasone (FLONASE) 50 MCG/ACT nasal spray Spray 1 Spray in nose every day. 16 g 0   • cyclosporin (RESTASIS) 0.05 % ophthalmic emulsion Place 1 Drop in both eyes 2 times a day.     • Dextromethorphan Polistirex (DELSYM PO) Take  by mouth.       No facility-administered encounter medications on file as of 4/27/2021.     Review of Systems   Constitutional: Positive for weight loss.   HENT: Negative.    Respiratory: Negative.    Cardiovascular: Negative.    Gastrointestinal: Negative.    Musculoskeletal: Negative.    Skin: Negative.    Neurological: Negative.    Endo/Heme/Allergies: Negative.   "       Objective:   /78 (BP Location: Left arm, Patient Position: Sitting, BP Cuff Size: Adult)   Pulse 68   Ht 1.651 m (5' 5\")   Wt 90.7 kg (200 lb)   SpO2 97%   BMI 33.28 kg/m²     Physical Exam   Constitutional: She is oriented to person, place, and time. No distress.   Obese   HENT:   Head: Normocephalic and atraumatic.   Eyes: Pupils are equal, round, and reactive to light. No scleral icterus.   Neck: No JVD present.   Cardiovascular: Normal rate and regular rhythm.   No murmur heard.  Pulmonary/Chest: No respiratory distress. She has no wheezes.   Abdominal: Soft. She exhibits no distension. There is no abdominal tenderness.   Musculoskeletal:         General: No edema.   Neurological: She is alert and oriented to person, place, and time.   Skin: Skin is warm.   Psychiatric: She has a normal mood and affect.       Assessment:     1. SVT (supraventricular tachycardia) (HCC)     2. Heart palpitations     3. Essential hypertension     4. Mixed hyperlipidemia         Medical Decision Making:  Today's Assessment / Status / Plan:   Hypertension: Under excellent control on current medications.  PSVT: Completely suppressed with current dose of metoprolol  History of mild hyperlipidemia: CT calcium score is 0.  Recommend dietary measures and exercise.  ACC exercise, guidelines discussed with patient today.  Her medicines will be refilled.  Return as needed or in 1 year.  "

## 2021-05-18 DIAGNOSIS — E03.9 HYPOTHYROIDISM, UNSPECIFIED TYPE: ICD-10-CM

## 2021-05-18 RX ORDER — LEVOTHYROXINE SODIUM 0.1 MG/1
TABLET ORAL
Qty: 90 TABLET | Refills: 2 | Status: SHIPPED | OUTPATIENT
Start: 2021-05-18 | End: 2022-02-21

## 2021-07-29 ENCOUNTER — TELEPHONE (OUTPATIENT)
Dept: OBGYN | Facility: CLINIC | Age: 56
End: 2021-07-29

## 2021-07-29 NOTE — TELEPHONE ENCOUNTER
Called Sarika due to her wanting to establish care but she states that she just had her annual. States she will call back when it gets closer to getting her next annual.

## 2021-08-17 ENCOUNTER — APPOINTMENT (RX ONLY)
Dept: URBAN - METROPOLITAN AREA CLINIC 4 | Facility: CLINIC | Age: 56
Setting detail: DERMATOLOGY
End: 2021-08-17

## 2021-08-17 DIAGNOSIS — L81.4 OTHER MELANIN HYPERPIGMENTATION: ICD-10-CM

## 2021-08-17 DIAGNOSIS — Z85.828 PERSONAL HISTORY OF OTHER MALIGNANT NEOPLASM OF SKIN: ICD-10-CM

## 2021-08-17 DIAGNOSIS — D18.0 HEMANGIOMA: ICD-10-CM

## 2021-08-17 DIAGNOSIS — D22 MELANOCYTIC NEVI: ICD-10-CM

## 2021-08-17 DIAGNOSIS — L57.8 OTHER SKIN CHANGES DUE TO CHRONIC EXPOSURE TO NONIONIZING RADIATION: ICD-10-CM

## 2021-08-17 DIAGNOSIS — L82.1 OTHER SEBORRHEIC KERATOSIS: ICD-10-CM

## 2021-08-17 PROBLEM — D22.5 MELANOCYTIC NEVI OF TRUNK: Status: ACTIVE | Noted: 2021-08-17

## 2021-08-17 PROBLEM — D18.01 HEMANGIOMA OF SKIN AND SUBCUTANEOUS TISSUE: Status: ACTIVE | Noted: 2021-08-17

## 2021-08-17 PROBLEM — D48.5 NEOPLASM OF UNCERTAIN BEHAVIOR OF SKIN: Status: ACTIVE | Noted: 2021-08-17

## 2021-08-17 PROCEDURE — ? COUNSELING

## 2021-08-17 PROCEDURE — 11102 TANGNTL BX SKIN SINGLE LES: CPT

## 2021-08-17 PROCEDURE — ? BIOPSY BY SHAVE METHOD

## 2021-08-17 PROCEDURE — 99213 OFFICE O/P EST LOW 20 MIN: CPT | Mod: 25

## 2021-08-17 ASSESSMENT — LOCATION SIMPLE DESCRIPTION DERM
LOCATION SIMPLE: RIGHT HAND
LOCATION SIMPLE: RIGHT ANTERIOR NECK
LOCATION SIMPLE: RIGHT CHEEK
LOCATION SIMPLE: LEFT HAND
LOCATION SIMPLE: RIGHT UPPER BACK
LOCATION SIMPLE: LEFT UPPER BACK

## 2021-08-17 ASSESSMENT — LOCATION ZONE DERM
LOCATION ZONE: TRUNK
LOCATION ZONE: HAND
LOCATION ZONE: FACE
LOCATION ZONE: NECK

## 2021-08-17 ASSESSMENT — LOCATION DETAILED DESCRIPTION DERM
LOCATION DETAILED: RIGHT SUPERIOR UPPER BACK
LOCATION DETAILED: RIGHT INFERIOR ANTERIOR NECK
LOCATION DETAILED: RIGHT RADIAL DORSAL HAND
LOCATION DETAILED: RIGHT INFERIOR CENTRAL MALAR CHEEK
LOCATION DETAILED: RIGHT INFERIOR MEDIAL UPPER BACK
LOCATION DETAILED: LEFT SUPERIOR UPPER BACK
LOCATION DETAILED: LEFT RADIAL DORSAL HAND

## 2022-01-21 ENCOUNTER — OFFICE VISIT (OUTPATIENT)
Dept: URGENT CARE | Facility: CLINIC | Age: 57
End: 2022-01-21
Payer: COMMERCIAL

## 2022-01-21 VITALS
TEMPERATURE: 97.1 F | SYSTOLIC BLOOD PRESSURE: 148 MMHG | BODY MASS INDEX: 31.27 KG/M2 | OXYGEN SATURATION: 92 % | HEIGHT: 66 IN | WEIGHT: 194.6 LBS | DIASTOLIC BLOOD PRESSURE: 70 MMHG | HEART RATE: 70 BPM | RESPIRATION RATE: 22 BRPM

## 2022-01-21 DIAGNOSIS — U07.1 COVID: ICD-10-CM

## 2022-01-21 PROCEDURE — 99203 OFFICE O/P NEW LOW 30 MIN: CPT | Performed by: NURSE PRACTITIONER

## 2022-01-21 RX ORDER — ALBUTEROL SULFATE 90 UG/1
2 AEROSOL, METERED RESPIRATORY (INHALATION) EVERY 6 HOURS PRN
Qty: 8.5 G | Refills: 0 | Status: SHIPPED
Start: 2022-01-21 | End: 2022-07-29

## 2022-01-21 ASSESSMENT — ENCOUNTER SYMPTOMS
COUGH: 1
EYE PAIN: 0
CHILLS: 1
FEVER: 0
SPUTUM PRODUCTION: 0
NAUSEA: 0
SHORTNESS OF BREATH: 1
DIZZINESS: 0
SORE THROAT: 0
VOMITING: 0
MYALGIAS: 0

## 2022-01-21 NOTE — PROGRESS NOTES
Subjective:   Clary Bah is a 56 y.o. female who presents for Cough (Pt has a runny nose, fatigue, congestion x 2 weeks )      URI   This is a new problem. Episode onset: 2 weeks; tested postive COVID dx with covid pneumonia currently on steriods  The problem has been unchanged. There has been no fever. Associated symptoms include congestion and coughing. Pertinent negatives include no chest pain, nausea, rash, sore throat or vomiting. She has tried acetaminophen for the symptoms. The treatment provided no relief.       Review of Systems   Constitutional: Positive for chills and malaise/fatigue. Negative for fever.   HENT: Positive for congestion. Negative for sore throat.    Eyes: Negative for pain.   Respiratory: Positive for cough and shortness of breath. Negative for sputum production.    Cardiovascular: Negative for chest pain.   Gastrointestinal: Negative for nausea and vomiting.   Genitourinary: Negative for hematuria.   Musculoskeletal: Negative for myalgias.   Skin: Negative for rash.   Neurological: Negative for dizziness.       Medications:    • albuterol Aers  • cyclosporin  • DELSYM PO  • fluticasone  • Levothyroxine Sodium Caps  • levothyroxine Tabs  • losartan-hydrochlorothiazide  • metoprolol SR Tb24    Allergies: Ace inhibitors and Atorvastatin    Problem List: Clary Bah does not have any pertinent problems on file.    Surgical History:  Past Surgical History:   Procedure Laterality Date   • RECTOCELE REPAIR  3/15/2011    Performed by MARK RAMÍREZ at SURGERY [facility] DAY [facility]   • BIOPSY GENERAL  10/20/08    Performed by MARK RAMÍREZ at SURGERY SAME DAY [facility]   • HYSTEROSCOPY WITH VIDEO OPERATIVE  10/20/08    Performed by MARK RAMÍREZ at SURGERY SAME DAY [facility]   • GYN SURGERY         • BRIANNE BY LAPAROSCOPY         Past Social Hx: Clary Bah  reports that she has never smoked. She has never used smokeless tobacco. She reports previous  "alcohol use. She reports that she does not use drugs.     Past Family Hx:  Clary Bah family history includes Arthritis in her mother; Diabetes in her father; Heart Disease (age of onset: 67) in her father; Hypertension in her mother and sister.     Problem list, medications, and allergies reviewed by myself today in Epic.     Objective:     /70 (BP Location: Left arm, Patient Position: Sitting, BP Cuff Size: Adult)   Pulse 70   Temp 36.2 °C (97.1 °F) (Temporal)   Resp (!) 22   Ht 1.676 m (5' 6\")   Wt 88.3 kg (194 lb 9.6 oz)   SpO2 92%   BMI 31.41 kg/m²     Physical Exam  Vitals and nursing note reviewed.   Constitutional:       General: She is not in acute distress.     Appearance: She is well-developed.   HENT:      Head: Normocephalic and atraumatic.      Right Ear: Tympanic membrane and external ear normal.      Left Ear: Tympanic membrane and external ear normal.      Nose: Nose normal.      Right Sinus: No maxillary sinus tenderness or frontal sinus tenderness.      Left Sinus: No maxillary sinus tenderness or frontal sinus tenderness.      Mouth/Throat:      Mouth: Mucous membranes are moist.      Pharynx: Uvula midline. No posterior oropharyngeal erythema.      Tonsils: No tonsillar exudate or tonsillar abscesses.   Eyes:      General:         Right eye: No discharge.         Left eye: No discharge.      Conjunctiva/sclera: Conjunctivae normal.   Cardiovascular:      Rate and Rhythm: Normal rate.   Pulmonary:      Effort: Pulmonary effort is normal. No respiratory distress.      Breath sounds: Normal breath sounds.   Abdominal:      General: There is no distension.   Musculoskeletal:         General: Normal range of motion.   Skin:     General: Skin is warm and dry.   Neurological:      General: No focal deficit present.      Mental Status: She is alert and oriented to person, place, and time. Mental status is at baseline.      Gait: Gait (gait at baseline) normal.   Psychiatric:         " Judgment: Judgment normal.         Assessment/Plan:     Diagnosis and associated orders:     1. COVID  albuterol 108 (90 Base) MCG/ACT Aero Soln inhalation aerosol      Comments/MDM:     Symptomatic and supportive care:   Plenty of oral hydration and rest   Over the counter cough suppressant as directed.  Tylenol or ibuprofen for pain and fever as directed.   Warm salt water gargles for sore throat, soft foods, cool liquids.   Saline nasal spray and Flonase as a decongestant.   Infection control measures at home. Stay away from people, Hand washing, covering sneeze/cough, disinfect surfaces.   Overall, the patient is well-appearing. They are not hypoxic, afebrile, and a normal pulmonary exam.      •                Please note that this dictation was created using voice recognition software. I have made a reasonable attempt to correct obvious errors, but I expect that there are errors of grammar and possibly content that I did not discover before finalizing the note.    This note was electronically signed by Madi OACMPO.

## 2022-04-28 ENCOUNTER — TELEPHONE (OUTPATIENT)
Dept: CARDIOLOGY | Facility: MEDICAL CENTER | Age: 57
End: 2022-04-28
Payer: COMMERCIAL

## 2022-04-28 DIAGNOSIS — I10 ESSENTIAL HYPERTENSION: ICD-10-CM

## 2022-04-28 RX ORDER — METOPROLOL SUCCINATE 100 MG/1
100 TABLET, EXTENDED RELEASE ORAL DAILY
Qty: 90 TABLET | Refills: 0 | Status: SHIPPED | OUTPATIENT
Start: 2022-04-28 | End: 2022-07-29 | Stop reason: SDUPTHER

## 2022-05-21 DIAGNOSIS — E03.9 HYPOTHYROIDISM, UNSPECIFIED TYPE: ICD-10-CM

## 2022-05-23 RX ORDER — LEVOTHYROXINE SODIUM 0.1 MG/1
TABLET ORAL
Qty: 90 TABLET | Refills: 0 | OUTPATIENT
Start: 2022-05-23

## 2022-05-27 ENCOUNTER — TELEPHONE (OUTPATIENT)
Dept: ENDOCRINOLOGY | Facility: MEDICAL CENTER | Age: 57
End: 2022-05-27
Payer: COMMERCIAL

## 2022-05-27 ENCOUNTER — TELEPHONE (OUTPATIENT)
Dept: SCHEDULING | Facility: IMAGING CENTER | Age: 57
End: 2022-05-27

## 2022-05-27 DIAGNOSIS — E03.9 HYPOTHYROIDISM, UNSPECIFIED TYPE: ICD-10-CM

## 2022-05-27 RX ORDER — LEVOTHYROXINE SODIUM 0.1 MG/1
100 TABLET ORAL
Qty: 30 TABLET | Refills: 0 | Status: CANCELLED | OUTPATIENT
Start: 2022-05-27

## 2022-05-27 NOTE — TELEPHONE ENCOUNTER
Patient called in stating that we had denied her medication. I have let her know we have not seen her in over a year and that is why we cannot refill her  Medication. She asked if she could make an appointment and I stated should need to get a referral on file first from her PCP. She stated that she did not have a PCP and that Dr. Nascimento is her PCP. I informed her that Dr. Byrd is no longer here and she stated that she was unaware of him leaving I did state that he did leave at the end of the year. She stated that she is very unhappy. I did stated that I could ask if a provider could fill her RX for 30 days but in the in meantime she would need to get a PCP and a referral. She asked me to call her back when the mediation was approved or denied and I stated I would.     Please fill her medication only for 30 days.     Thank you.

## 2022-05-31 ENCOUNTER — OFFICE VISIT (OUTPATIENT)
Dept: MEDICAL GROUP | Facility: LAB | Age: 57
End: 2022-05-31
Payer: COMMERCIAL

## 2022-05-31 VITALS
OXYGEN SATURATION: 95 % | DIASTOLIC BLOOD PRESSURE: 68 MMHG | HEIGHT: 66 IN | WEIGHT: 209 LBS | RESPIRATION RATE: 16 BRPM | HEART RATE: 72 BPM | BODY MASS INDEX: 33.59 KG/M2 | TEMPERATURE: 98.1 F | SYSTOLIC BLOOD PRESSURE: 130 MMHG

## 2022-05-31 DIAGNOSIS — E78.2 MIXED HYPERLIPIDEMIA: ICD-10-CM

## 2022-05-31 DIAGNOSIS — Z12.31 ENCOUNTER FOR SCREENING MAMMOGRAM FOR MALIGNANT NEOPLASM OF BREAST: ICD-10-CM

## 2022-05-31 DIAGNOSIS — Z13.1 DIABETES MELLITUS SCREENING: ICD-10-CM

## 2022-05-31 DIAGNOSIS — I10 ESSENTIAL HYPERTENSION: ICD-10-CM

## 2022-05-31 DIAGNOSIS — Z00.00 ENCOUNTER FOR MEDICAL EXAMINATION TO ESTABLISH CARE: ICD-10-CM

## 2022-05-31 DIAGNOSIS — E55.9 VITAMIN D DEFICIENCY: ICD-10-CM

## 2022-05-31 DIAGNOSIS — E03.9 HYPOTHYROIDISM, UNSPECIFIED TYPE: ICD-10-CM

## 2022-05-31 PROCEDURE — 99386 PREV VISIT NEW AGE 40-64: CPT | Performed by: PHYSICIAN ASSISTANT

## 2022-05-31 RX ORDER — LEVOTHYROXINE SODIUM 0.1 MG/1
100 TABLET ORAL
Qty: 90 TABLET | Refills: 3 | Status: SHIPPED | OUTPATIENT
Start: 2022-05-31 | End: 2023-05-30

## 2022-05-31 ASSESSMENT — PATIENT HEALTH QUESTIONNAIRE - PHQ9: CLINICAL INTERPRETATION OF PHQ2 SCORE: 0

## 2022-05-31 NOTE — PROGRESS NOTES
"Subjective:     CC:  There were no encounter diagnoses.    HISTORY OF THE PRESENT ILLNESS: Patient is a 56 y.o. female. This pleasant patient is here today to establish care. His/her prior PCP was Pat Mota in 2016    Chronic autoimmune hypothyroidism  Was established with endcrinology - provider there retired.     Hyperlipidemia  Follows with cardiology    Mirena IUD - follows with OBGYN for this and Pap smears    Health Maintenance     - Dyslipidemia (30-45): Ordered today  - Diabetes (HTN, HLD, BMI >25): Ordered today  - Depression screening (PHQ-2 and/or PHQ-9): neg  - Dental: UTD  - Eye: UTD  Diet: \"pretty healthy\", some fast food  Exercise: \"minimal\"  Substance Use:  Negative  Tobacco Use/counseling: None negative       Cancer screening  - Colon CA (45-75) - FIT (annual) cspy (q10yr): Completed 04/01/2021  - Cervical CA (21-65): Completed 01/20/2021  -  HX Abnormal pap/HPV: none  - Breast CA: mammo (required 50-75yo) or starting 40 (ACOG, ACR), 45 (ACS), 50 (USPTF): Ordered today     Infectious disease screening/Immunizations  --Immunizations: Declines COVID-vaccine, due for shingles vaccine      Current Outpatient Medications Ordered in Epic   Medication Sig Dispense Refill   • metoprolol SR (TOPROL XL) 100 MG TABLET SR 24 HR Take 1 Tablet by mouth every day. Please call [phone] to schedule a follow up for future refills. Thank you 90 Tablet 0   • levothyroxine (SYNTHROID) 100 MCG Tab TAKE ONE TABLET BY MOUTH EVERY MORNING ON AN EMPTY STOMACH 90 Tablet 0   • albuterol 108 (90 Base) MCG/ACT Aero Soln inhalation aerosol Inhale 2 Puffs every 6 hours as needed for Shortness of Breath. 8.5 g 0   • losartan-hydrochlorothiazide (HYZAAR) 100-25 MG per tablet Take 1 tablet by mouth every day. 90 tablet 3   • fluticasone (FLONASE) 50 MCG/ACT nasal spray Spray 1 Spray in nose every day. 16 g 0   • cyclosporin (RESTASIS) 0.05 % ophthalmic emulsion Place 1 Drop in both eyes 2 times a day. (Patient not taking: " "Reported on 5/31/2022)       No current [facility]-ordered facility-administered medications on file.         ROS:   Gen: no fevers/chills, no changes in weight  Eyes: no changes in vision  ENT: no sore throat, no hearing loss, no bloody nose  Pulm: no sob, no cough  CV: no chest pain, no palpitations  GI: no nausea/vomiting, no diarrhea  : no dysuria  MSk: no myalgias  Skin: no rash  Neuro: no headaches, no numbness/tingling  Heme/Lymph: no easy bruising      Objective:     Exam: /68   Pulse 72   Temp 36.7 °C (98.1 °F)   Resp 16   Ht 1.676 m (5' 6\")   Wt 94.8 kg (209 lb)   SpO2 95%  Body mass index is 33.73 kg/m².    General: Normal appearing. No distress.  HEENT: Normocephalic. Eyes conjunctiva clear lids without ptosis, pupils equal and reactive to light accommodation, ears normal shape and contour, canals are clear bilaterally, tympanic membranes are benign, nasal mucosa benign, oropharynx is without erythema, edema or exudates.   Neck: Supple without JVD or bruit. Thyroid is not enlarged.  Pulmonary: Clear to ausculation.  Normal effort. No rales, ronchi, or wheezing.  Cardiovascular: Regular rate and rhythm without murmur. Carotid and radial pulses are intact and equal bilaterally.  Abdomen: Soft, nontender, nondistended. Normal bowel sounds. Liver and spleen are not palpable  Neurologic: Grossly nonfocal  Lymph: No cervical or supraclavicular lymph nodes are palpable  Skin: Warm and dry.  No obvious lesions.  Musculoskeletal: Normal gait. No extremity cyanosis, clubbing, or edema.  Psych: Normal mood and affect. Alert and oriented x3. Judgment and insight is normal.    Assessment & Plan:   56 y.o. female with the following -    1. Encounter for medical examination to establish care  Labs per orders  Vaccinations per orders  Screenings per orders      2. Diabetes mellitus screening  - HEMOGLOBIN A1C; Future    3. Encounter for screening mammogram for malignant neoplasm of breast  - MA-SCREENING MAMMO " BILAT W/TOMOSYNTHESIS W/CAD; Future    4. Hypothyroidism, unspecified type  Chronic condition, stable  Due for updated TSH  Continue current dose levothyroxine, dose change pending test results  - levothyroxine (SYNTHROID) 100 MCG Tab; Take 1 Tablet by mouth every morning on an empty stomach.  Dispense: 90 Tablet; Refill: 3  - TSH WITH REFLEX TO FT4; Future  - THYROID PEROXIDASE  (TPO) AB; Future  - ANTITHYROGLOBULIN AB; Future    5. Essential hypertension  This is a chronic condition, controlled.  Blood pressure is at goal under 140/90 in the office today.  We will continue the current regimen.      6. Mixed hyperlipidemia  Chronic condition, stable  Continue follow-up with cardiology as scheduled  - CBC WITH DIFFERENTIAL; Future  - Comp Metabolic Panel; Future  - Lipid Profile; Future    7. Vitamin D deficiency  Chronic condition, stable  Due for recheck of vitamin D  - VITAMIN D 25-HYDROXY      I spent a total of 20 minutes with record review, exam, communication with the patient, communication with other providers, and documentation of this encounter.    No follow-ups on file.    Please note that this dictation was created using voice recognition software. I have made every reasonable attempt to correct obvious errors, but I expect that there are errors of grammar and possibly content that I did not discover before finalizing the note.

## 2022-06-16 DIAGNOSIS — I10 ESSENTIAL HYPERTENSION: ICD-10-CM

## 2022-06-16 RX ORDER — LOSARTAN POTASSIUM AND HYDROCHLOROTHIAZIDE 25; 100 MG/1; MG/1
1 TABLET ORAL DAILY
Qty: 90 TABLET | Refills: 0 | Status: SHIPPED | OUTPATIENT
Start: 2022-06-16 | End: 2022-07-29 | Stop reason: SDUPTHER

## 2022-06-17 NOTE — TELEPHONE ENCOUNTER
Med due for refill?Yes  Last OV: 4/27/22  Next OV: None schedule   Provider to Refill: ADD  Patient was last seen by  Rx filled under ADD.

## 2022-07-29 ENCOUNTER — OFFICE VISIT (OUTPATIENT)
Dept: CARDIOLOGY | Facility: MEDICAL CENTER | Age: 57
End: 2022-07-29
Payer: COMMERCIAL

## 2022-07-29 VITALS
HEIGHT: 66 IN | WEIGHT: 212 LBS | SYSTOLIC BLOOD PRESSURE: 130 MMHG | DIASTOLIC BLOOD PRESSURE: 70 MMHG | RESPIRATION RATE: 18 BRPM | BODY MASS INDEX: 34.07 KG/M2 | HEART RATE: 80 BPM | OXYGEN SATURATION: 96 %

## 2022-07-29 DIAGNOSIS — I47.10 SVT (SUPRAVENTRICULAR TACHYCARDIA) (HCC): ICD-10-CM

## 2022-07-29 DIAGNOSIS — E06.3 HYPOTHYROIDISM, ACQUIRED, AUTOIMMUNE: ICD-10-CM

## 2022-07-29 DIAGNOSIS — E06.3 CHRONIC AUTOIMMUNE THYROIDITIS: ICD-10-CM

## 2022-07-29 DIAGNOSIS — R00.2 PALPITATIONS: ICD-10-CM

## 2022-07-29 DIAGNOSIS — E78.2 MIXED HYPERLIPIDEMIA: ICD-10-CM

## 2022-07-29 DIAGNOSIS — R00.0 TACHYCARDIA: ICD-10-CM

## 2022-07-29 DIAGNOSIS — I10 ESSENTIAL HYPERTENSION: ICD-10-CM

## 2022-07-29 PROCEDURE — 99214 OFFICE O/P EST MOD 30 MIN: CPT | Performed by: INTERNAL MEDICINE

## 2022-07-29 RX ORDER — METOPROLOL SUCCINATE 100 MG/1
100 TABLET, EXTENDED RELEASE ORAL DAILY
Qty: 90 TABLET | Refills: 3 | Status: SHIPPED | OUTPATIENT
Start: 2022-07-29 | End: 2023-02-16 | Stop reason: SDUPTHER

## 2022-07-29 RX ORDER — LOSARTAN POTASSIUM AND HYDROCHLOROTHIAZIDE 25; 100 MG/1; MG/1
1 TABLET ORAL DAILY
Qty: 90 TABLET | Refills: 3 | Status: SHIPPED | OUTPATIENT
Start: 2022-07-29 | End: 2023-02-16 | Stop reason: SDUPTHER

## 2022-07-29 ASSESSMENT — ENCOUNTER SYMPTOMS
NEUROLOGICAL NEGATIVE: 1
SORE THROAT: 0
CARDIOVASCULAR NEGATIVE: 1
BRUISES/BLEEDS EASILY: 0
MUSCULOSKELETAL NEGATIVE: 1
STRIDOR: 0
GASTROINTESTINAL NEGATIVE: 1
COUGH: 0
SPUTUM PRODUCTION: 0
CHILLS: 0
DIZZINESS: 0
ORTHOPNEA: 0
FEVER: 0
RESPIRATORY NEGATIVE: 1
CONSTITUTIONAL NEGATIVE: 1
HEMOPTYSIS: 0
EYES NEGATIVE: 1
CLAUDICATION: 0
PALPITATIONS: 0
WEAKNESS: 0
WHEEZING: 0
SHORTNESS OF BREATH: 0
LOSS OF CONSCIOUSNESS: 0
PND: 0

## 2022-07-29 NOTE — PROGRESS NOTES
Chief Complaint   Patient presents with   • Supraventricular Tachycardia (SVT)   • Palpitations   • Hypertension     F/V Dx: Essential hypertension           Carolyn Bah is a 56 y.o. female who presents today as a follow-up from a prior provider for history of hypertension hyperlipidemia palpitations and a calcium score of 0 but since she was last seen she continued to have palpitations.  She is on metoprolol.  It happens with caffeine.  She does take magnesium for cramps.  Her blood pressure is mildly elevated but at home it is under good control.    Past Medical History:   Diagnosis Date   • Anesthesia     slow to wake up   • Heart palpitations    • Hypertension    • Hypothyroid    • Vitamin D deficiency      Past Surgical History:   Procedure Laterality Date   • RECTOCELE REPAIR  3/15/2011    [facility]   • BI[facility]SY GENERAL  10/20/08    [facility]   • HYSTEROSCOPY WITH VIDEO OPERATIVE  10/20/08    [facility]   • GYN SURGERY         • BRIANNE BY LAPAROSCOPY       Family History   Problem Relation Age of Onset   • Arthritis Mother    • Hypertension Mother    • Heart Disease Father 67        heart attack   • Diabetes Father    • Hypertension Sister      Social History     Socioeconomic History   • Marital status:      Spouse name: Not on file   • Number of children: Not on file   • Years of education: Not on file   • Highest education level: Not on file   Occupational History   • Not on file   Tobacco Use   • Smoking status: Never Smoker   • Smokeless tobacco: Never Used   Vaping Use   • Vaping Use: Never used   Substance and Sexual Activity   • Alcohol use: Yes     Comment: occ   • Drug use: No   • Sexual activity: Yes     Partners: Male     Comment: vasectomy   Other Topics Concern   • Not on file   Social History  Narrative   • Not on file     Social Determinants of Health     Financial Resource Strain: Not on file   Food Insecurity: Not on file   Transportation Needs: Not on file   Physical Activity: Not on file   Stress: Not on file   Social Connections: Not on file   Intimate Partner Violence: Not on file   Housing Stability: Not on file     Allergies   Allergen Reactions   • Ace Inhibitors Cough   • Atorvastatin      Statin not indicted with coronary calcium score of 0.     Outpatient Encounter Medications as of 7/29/2022   Medication Sig Dispense Refill   • metoprolol SR (TOPROL XL) 100 MG TABLET SR 24 HR Take 1 Tablet by mouth every day. 90 Tablet 3   • losartan-hydrochlorothiazide (HYZAAR) 100-25 MG per tablet Take 1 Tablet by mouth every day. 90 Tablet 3   • levothyroxine (SYNTHROID) 100 MCG Tab Take 1 Tablet by mouth every morning on an empty stomach. 90 Tablet 3   • fluticasone (FLONASE) 50 MCG/ACT nasal spray Spray 1 Spray in nose every day. 16 g 0   • [DISCONTINUED] losartan-hydrochlorothiazide (HYZAAR) 100-25 MG per tablet Take 1 Tablet by mouth every day. 90 Tablet 0   • [DISCONTINUED] metoprolol SR (TOPROL XL) 100 MG TABLET SR 24 HR Take 1 Tablet by mouth every day. Please call [phone] to schedule a follow up for future refills. Thank you 90 Tablet 0   • [DISCONTINUED] albuterol 108 (90 Base) MCG/ACT Aero Soln inhalation aerosol Inhale 2 Puffs every 6 hours as needed for Shortness of Breath. (Patient not taking: Reported on 7/29/2022) 8.5 g 0   • [DISCONTINUED] cyclosporin (RESTASIS) 0.05 % ophthalmic emulsion Place 1 Drop in both eyes 2 times a day. (Patient not taking: No sig reported)       No facility-administered encounter medications on file as of 7/29/2022.     Review of Systems   Constitutional: Negative.  Negative for chills, fever and malaise/fatigue.   HENT: Negative.  Negative for sore throat.    Eyes: Negative.    Respiratory: Negative.  Negative for cough, hemoptysis, sputum production,  "shortness of breath, wheezing and stridor.    Cardiovascular: Negative.  Negative for chest pain, palpitations, orthopnea, claudication, leg swelling and PND.   Gastrointestinal: Negative.    Genitourinary: Negative.    Musculoskeletal: Negative.    Skin: Negative.    Neurological: Negative.  Negative for dizziness, loss of consciousness and weakness.   Endo/Heme/Allergies: Negative.  Does not bruise/bleed easily.   All other systems reviewed and are negative.             Objective     BP (!) 146/94 (BP Location: Left arm, Patient Position: Sitting, BP Cuff Size: Adult)   Pulse 80   Resp 18   Ht 1.676 m (5' 6\")   Wt 96.2 kg (212 lb)   SpO2 96%   BMI 34.22 kg/m²     Physical Exam           Assessment & Plan     1. Heart palpitations     2. Essential hypertension  metoprolol SR (TOPROL XL) 100 MG TABLET SR 24 HR    losartan-hydrochlorothiazide (HYZAAR) 100-25 MG per tablet   3. Mixed hyperlipidemia  OVERNIGHT PULSE OXIMETRY   4. Tachycardia  OVERNIGHT PULSE OXIMETRY   5. SVT (supraventricular tachycardia) (HCC)  OVERNIGHT PULSE OXIMETRY   6. Chronic autoimmune thyroiditis     7. Hypothyroidism, acquired, autoimmune         Medical Decision Making: Today's Assessment/Status/Plan:        56-year-old female with a calcium score of 0 and palpitations.  She was on the Toprol.  She is not on the magnesium.  Otherwise we can continue to follow her cholesterol expectantly with lifestyle changes.  I did offer her an O p.o. for possible sleep apnea which we will order today.  Otherwise I will see her back in 6 months.              "

## 2022-08-01 ENCOUNTER — TELEPHONE (OUTPATIENT)
Dept: CARDIOLOGY | Facility: MEDICAL CENTER | Age: 57
End: 2022-08-01
Payer: COMMERCIAL

## 2022-08-01 NOTE — TELEPHONE ENCOUNTER
Fax OPO to [facility]  Phone Number: [phone]  Fax Number : [phone]  Fax Confirmation has been reseived

## 2022-10-06 ENCOUNTER — APPOINTMENT (OUTPATIENT)
Dept: RADIOLOGY | Facility: MEDICAL CENTER | Age: 57
DRG: 233 | End: 2022-10-06
Attending: EMERGENCY MEDICINE
Payer: COMMERCIAL

## 2022-10-06 ENCOUNTER — HOSPITAL ENCOUNTER (INPATIENT)
Facility: MEDICAL CENTER | Age: 57
LOS: 12 days | DRG: 233 | End: 2022-10-18
Attending: EMERGENCY MEDICINE | Admitting: STUDENT IN AN ORGANIZED HEALTH CARE EDUCATION/TRAINING PROGRAM
Payer: COMMERCIAL

## 2022-10-06 DIAGNOSIS — Z95.1 S/P CABG X 2: ICD-10-CM

## 2022-10-06 DIAGNOSIS — I16.1 HYPERTENSIVE EMERGENCY: ICD-10-CM

## 2022-10-06 DIAGNOSIS — I25.10 CORONARY ARTERY DISEASE DUE TO CALCIFIED CORONARY LESION: ICD-10-CM

## 2022-10-06 DIAGNOSIS — R07.9 ACUTE CHEST PAIN: ICD-10-CM

## 2022-10-06 DIAGNOSIS — I50.9 HEART FAILURE, UNSPECIFIED HF CHRONICITY, UNSPECIFIED HEART FAILURE TYPE (HCC): ICD-10-CM

## 2022-10-06 DIAGNOSIS — R00.2 PALPITATIONS: ICD-10-CM

## 2022-10-06 DIAGNOSIS — G89.18 ACUTE POSTOPERATIVE PAIN: ICD-10-CM

## 2022-10-06 DIAGNOSIS — I25.84 CORONARY ARTERY DISEASE DUE TO CALCIFIED CORONARY LESION: ICD-10-CM

## 2022-10-06 PROBLEM — I16.0 HYPERTENSIVE URGENCY: Status: ACTIVE | Noted: 2022-10-06

## 2022-10-06 LAB
ALBUMIN SERPL BCP-MCNC: 4 G/DL (ref 3.2–4.9)
ALBUMIN/GLOB SERPL: 1.6 G/DL
ALP SERPL-CCNC: 100 U/L (ref 30–99)
ALT SERPL-CCNC: 27 U/L (ref 2–50)
ANION GAP SERPL CALC-SCNC: 14 MMOL/L (ref 7–16)
AST SERPL-CCNC: 17 U/L (ref 12–45)
BASOPHILS # BLD AUTO: 0.5 % (ref 0–1.8)
BASOPHILS # BLD: 0.05 K/UL (ref 0–0.12)
BILIRUB SERPL-MCNC: 0.3 MG/DL (ref 0.1–1.5)
BUN SERPL-MCNC: 15 MG/DL (ref 8–22)
CALCIUM SERPL-MCNC: 9.6 MG/DL (ref 8.5–10.5)
CHLORIDE SERPL-SCNC: 102 MMOL/L (ref 96–112)
CO2 SERPL-SCNC: 22 MMOL/L (ref 20–33)
CREAT SERPL-MCNC: 0.9 MG/DL (ref 0.5–1.4)
EKG IMPRESSION: NORMAL
EOSINOPHIL # BLD AUTO: 0.26 K/UL (ref 0–0.51)
EOSINOPHIL NFR BLD: 2.6 % (ref 0–6.9)
ERYTHROCYTE [DISTWIDTH] IN BLOOD BY AUTOMATED COUNT: 43.8 FL (ref 35.9–50)
GFR SERPLBLD CREATININE-BSD FMLA CKD-EPI: 75 ML/MIN/1.73 M 2
GLOBULIN SER CALC-MCNC: 2.5 G/DL (ref 1.9–3.5)
GLUCOSE SERPL-MCNC: 135 MG/DL (ref 65–99)
HCT VFR BLD AUTO: 42.3 % (ref 37–47)
HGB BLD-MCNC: 14.4 G/DL (ref 12–16)
IMM GRANULOCYTES # BLD AUTO: 0.03 K/UL (ref 0–0.11)
IMM GRANULOCYTES NFR BLD AUTO: 0.3 % (ref 0–0.9)
LYMPHOCYTES # BLD AUTO: 2.82 K/UL (ref 1–4.8)
LYMPHOCYTES NFR BLD: 28.1 % (ref 22–41)
MCH RBC QN AUTO: 30.3 PG (ref 27–33)
MCHC RBC AUTO-ENTMCNC: 34 G/DL (ref 33.6–35)
MCV RBC AUTO: 89.1 FL (ref 81.4–97.8)
MONOCYTES # BLD AUTO: 0.64 K/UL (ref 0–0.85)
MONOCYTES NFR BLD AUTO: 6.4 % (ref 0–13.4)
NEUTROPHILS # BLD AUTO: 6.22 K/UL (ref 2–7.15)
NEUTROPHILS NFR BLD: 62.1 % (ref 44–72)
NRBC # BLD AUTO: 0 K/UL
NRBC BLD-RTO: 0 /100 WBC
PLATELET # BLD AUTO: 324 K/UL (ref 164–446)
PMV BLD AUTO: 10.3 FL (ref 9–12.9)
POTASSIUM SERPL-SCNC: 3.2 MMOL/L (ref 3.6–5.5)
PROT SERPL-MCNC: 6.5 G/DL (ref 6–8.2)
RBC # BLD AUTO: 4.75 M/UL (ref 4.2–5.4)
SODIUM SERPL-SCNC: 138 MMOL/L (ref 135–145)
TROPONIN T SERPL-MCNC: 12 NG/L (ref 6–19)
TROPONIN T SERPL-MCNC: 61 NG/L (ref 6–19)
WBC # BLD AUTO: 10 K/UL (ref 4.8–10.8)

## 2022-10-06 PROCEDURE — 700102 HCHG RX REV CODE 250 W/ 637 OVERRIDE(OP): Performed by: EMERGENCY MEDICINE

## 2022-10-06 PROCEDURE — 85025 COMPLETE CBC W/AUTO DIFF WBC: CPT

## 2022-10-06 PROCEDURE — 93005 ELECTROCARDIOGRAM TRACING: CPT | Performed by: EMERGENCY MEDICINE

## 2022-10-06 PROCEDURE — 99223 1ST HOSP IP/OBS HIGH 75: CPT | Performed by: STUDENT IN AN ORGANIZED HEALTH CARE EDUCATION/TRAINING PROGRAM

## 2022-10-06 PROCEDURE — 84484 ASSAY OF TROPONIN QUANT: CPT

## 2022-10-06 PROCEDURE — A9270 NON-COVERED ITEM OR SERVICE: HCPCS | Performed by: EMERGENCY MEDICINE

## 2022-10-06 PROCEDURE — 83735 ASSAY OF MAGNESIUM: CPT

## 2022-10-06 PROCEDURE — 80061 LIPID PANEL: CPT

## 2022-10-06 PROCEDURE — 80053 COMPREHEN METABOLIC PANEL: CPT

## 2022-10-06 PROCEDURE — 770020 HCHG ROOM/CARE - TELE (206)

## 2022-10-06 PROCEDURE — 36415 COLL VENOUS BLD VENIPUNCTURE: CPT

## 2022-10-06 PROCEDURE — 71045 X-RAY EXAM CHEST 1 VIEW: CPT

## 2022-10-06 PROCEDURE — 99285 EMERGENCY DEPT VISIT HI MDM: CPT

## 2022-10-06 RX ORDER — LEVOTHYROXINE SODIUM 0.1 MG/1
100 TABLET ORAL
Status: DISCONTINUED | OUTPATIENT
Start: 2022-10-07 | End: 2022-10-18 | Stop reason: HOSPADM

## 2022-10-06 RX ORDER — NITROGLYCERIN 0.4 MG/1
0.4 TABLET SUBLINGUAL
Status: COMPLETED | OUTPATIENT
Start: 2022-10-06 | End: 2022-10-07

## 2022-10-06 RX ORDER — LOSARTAN POTASSIUM AND HYDROCHLOROTHIAZIDE 25; 100 MG/1; MG/1
1 TABLET ORAL DAILY
Status: DISCONTINUED | OUTPATIENT
Start: 2022-10-07 | End: 2022-10-07

## 2022-10-06 RX ADMIN — NITROGLYCERIN 1 INCH: 20 OINTMENT TOPICAL at 22:50

## 2022-10-06 RX ADMIN — NITROGLYCERIN 0.4 MG: 0.4 TABLET, ORALLY DISINTEGRATING SUBLINGUAL at 22:27

## 2022-10-06 ASSESSMENT — LIFESTYLE VARIABLES: DO YOU DRINK ALCOHOL: YES

## 2022-10-07 ENCOUNTER — APPOINTMENT (OUTPATIENT)
Dept: CARDIOLOGY | Facility: MEDICAL CENTER | Age: 57
DRG: 233 | End: 2022-10-07
Attending: GENERAL PRACTICE
Payer: COMMERCIAL

## 2022-10-07 PROBLEM — R07.9 PAIN IN THE CHEST: Status: ACTIVE | Noted: 2022-10-07

## 2022-10-07 PROBLEM — E87.6 HYPOKALEMIA: Status: ACTIVE | Noted: 2022-10-07

## 2022-10-07 LAB
AMPHET UR QL SCN: NEGATIVE
ANION GAP SERPL CALC-SCNC: 11 MMOL/L (ref 7–16)
BARBITURATES UR QL SCN: NEGATIVE
BENZODIAZ UR QL SCN: NEGATIVE
BUN SERPL-MCNC: 12 MG/DL (ref 8–22)
BZE UR QL SCN: NEGATIVE
CALCIUM SERPL-MCNC: 9.7 MG/DL (ref 8.5–10.5)
CANNABINOIDS UR QL SCN: NEGATIVE
CHLORIDE SERPL-SCNC: 106 MMOL/L (ref 96–112)
CHOLEST SERPL-MCNC: 212 MG/DL (ref 100–199)
CO2 SERPL-SCNC: 22 MMOL/L (ref 20–33)
CREAT SERPL-MCNC: 0.68 MG/DL (ref 0.5–1.4)
EST. AVERAGE GLUCOSE BLD GHB EST-MCNC: 117 MG/DL
GFR SERPLBLD CREATININE-BSD FMLA CKD-EPI: 102 ML/MIN/1.73 M 2
GLUCOSE SERPL-MCNC: 138 MG/DL (ref 65–99)
HBA1C MFR BLD: 5.7 % (ref 4–5.6)
HDLC SERPL-MCNC: 38 MG/DL
LDLC SERPL CALC-MCNC: 134 MG/DL
LV EJECT FRACT  99904: 75
MAGNESIUM SERPL-MCNC: 1.7 MG/DL (ref 1.5–2.5)
MAGNESIUM SERPL-MCNC: 1.9 MG/DL (ref 1.5–2.5)
METHADONE UR QL SCN: NEGATIVE
OPIATES UR QL SCN: NEGATIVE
OXYCODONE UR QL SCN: NEGATIVE
PCP UR QL SCN: NEGATIVE
POTASSIUM SERPL-SCNC: 3.5 MMOL/L (ref 3.6–5.5)
PROPOXYPH UR QL SCN: NEGATIVE
SODIUM SERPL-SCNC: 139 MMOL/L (ref 135–145)
TRIGL SERPL-MCNC: 198 MG/DL (ref 0–149)
TROPONIN T SERPL-MCNC: 37 NG/L (ref 6–19)
TROPONIN T SERPL-MCNC: 51 NG/L (ref 6–19)
TROPONIN T SERPL-MCNC: 90 NG/L (ref 6–19)

## 2022-10-07 PROCEDURE — 83036 HEMOGLOBIN GLYCOSYLATED A1C: CPT

## 2022-10-07 PROCEDURE — 84484 ASSAY OF TROPONIN QUANT: CPT

## 2022-10-07 PROCEDURE — 80307 DRUG TEST PRSMV CHEM ANLYZR: CPT

## 2022-10-07 PROCEDURE — A9270 NON-COVERED ITEM OR SERVICE: HCPCS | Performed by: GENERAL PRACTICE

## 2022-10-07 PROCEDURE — 96374 THER/PROPH/DIAG INJ IV PUSH: CPT

## 2022-10-07 PROCEDURE — 700102 HCHG RX REV CODE 250 W/ 637 OVERRIDE(OP)

## 2022-10-07 PROCEDURE — 700111 HCHG RX REV CODE 636 W/ 250 OVERRIDE (IP): Performed by: GENERAL PRACTICE

## 2022-10-07 PROCEDURE — 700102 HCHG RX REV CODE 250 W/ 637 OVERRIDE(OP): Performed by: STUDENT IN AN ORGANIZED HEALTH CARE EDUCATION/TRAINING PROGRAM

## 2022-10-07 PROCEDURE — 700102 HCHG RX REV CODE 250 W/ 637 OVERRIDE(OP): Performed by: EMERGENCY MEDICINE

## 2022-10-07 PROCEDURE — 700111 HCHG RX REV CODE 636 W/ 250 OVERRIDE (IP): Performed by: STUDENT IN AN ORGANIZED HEALTH CARE EDUCATION/TRAINING PROGRAM

## 2022-10-07 PROCEDURE — 36415 COLL VENOUS BLD VENIPUNCTURE: CPT

## 2022-10-07 PROCEDURE — A9270 NON-COVERED ITEM OR SERVICE: HCPCS | Performed by: EMERGENCY MEDICINE

## 2022-10-07 PROCEDURE — 93306 TTE W/DOPPLER COMPLETE: CPT

## 2022-10-07 PROCEDURE — A9270 NON-COVERED ITEM OR SERVICE: HCPCS | Performed by: STUDENT IN AN ORGANIZED HEALTH CARE EDUCATION/TRAINING PROGRAM

## 2022-10-07 PROCEDURE — 700117 HCHG RX CONTRAST REV CODE 255: Performed by: GENERAL PRACTICE

## 2022-10-07 PROCEDURE — 93306 TTE W/DOPPLER COMPLETE: CPT | Mod: 26 | Performed by: INTERNAL MEDICINE

## 2022-10-07 PROCEDURE — 700102 HCHG RX REV CODE 250 W/ 637 OVERRIDE(OP): Performed by: GENERAL PRACTICE

## 2022-10-07 PROCEDURE — 80048 BASIC METABOLIC PNL TOTAL CA: CPT

## 2022-10-07 PROCEDURE — 83735 ASSAY OF MAGNESIUM: CPT

## 2022-10-07 PROCEDURE — 770020 HCHG ROOM/CARE - TELE (206)

## 2022-10-07 PROCEDURE — A9270 NON-COVERED ITEM OR SERVICE: HCPCS

## 2022-10-07 RX ORDER — LOSARTAN POTASSIUM 50 MG/1
100 TABLET ORAL
Status: DISCONTINUED | OUTPATIENT
Start: 2022-10-07 | End: 2022-10-12

## 2022-10-07 RX ORDER — ATORVASTATIN CALCIUM 80 MG/1
80 TABLET, FILM COATED ORAL EVERY EVENING
Status: DISCONTINUED | OUTPATIENT
Start: 2022-10-07 | End: 2022-10-08

## 2022-10-07 RX ORDER — FLUTICASONE PROPIONATE 50 MCG
1 SPRAY, SUSPENSION (ML) NASAL
COMMUNITY
End: 2023-11-15

## 2022-10-07 RX ORDER — ASPIRIN 81 MG/1
81 TABLET, CHEWABLE ORAL DAILY
Status: DISCONTINUED | OUTPATIENT
Start: 2022-10-07 | End: 2022-10-12

## 2022-10-07 RX ORDER — HEPARIN SODIUM 5000 [USP'U]/ML
5000 INJECTION, SOLUTION INTRAVENOUS; SUBCUTANEOUS EVERY 8 HOURS
Status: DISCONTINUED | OUTPATIENT
Start: 2022-10-07 | End: 2022-10-09

## 2022-10-07 RX ORDER — HYDROCHLOROTHIAZIDE 25 MG/1
25 TABLET ORAL
Status: DISCONTINUED | OUTPATIENT
Start: 2022-10-07 | End: 2022-10-08

## 2022-10-07 RX ORDER — CARVEDILOL 3.12 MG/1
3.12 TABLET ORAL 2 TIMES DAILY WITH MEALS
Status: DISCONTINUED | OUTPATIENT
Start: 2022-10-08 | End: 2022-10-08

## 2022-10-07 RX ORDER — AMLODIPINE BESYLATE 10 MG/1
10 TABLET ORAL
Status: DISCONTINUED | OUTPATIENT
Start: 2022-10-07 | End: 2022-10-07

## 2022-10-07 RX ORDER — POTASSIUM CHLORIDE 20 MEQ/1
40 TABLET, EXTENDED RELEASE ORAL ONCE
Status: COMPLETED | OUTPATIENT
Start: 2022-10-07 | End: 2022-10-07

## 2022-10-07 RX ORDER — AMLODIPINE BESYLATE 5 MG/1
5 TABLET ORAL
Status: CANCELLED | OUTPATIENT
Start: 2022-10-07

## 2022-10-07 RX ORDER — HYDRALAZINE HYDROCHLORIDE 20 MG/ML
10 INJECTION INTRAMUSCULAR; INTRAVENOUS EVERY 6 HOURS PRN
Status: DISCONTINUED | OUTPATIENT
Start: 2022-10-07 | End: 2022-10-07

## 2022-10-07 RX ORDER — HYDRALAZINE HYDROCHLORIDE 20 MG/ML
10 INJECTION INTRAMUSCULAR; INTRAVENOUS EVERY 6 HOURS PRN
Status: DISCONTINUED | OUTPATIENT
Start: 2022-10-07 | End: 2022-10-08

## 2022-10-07 RX ADMIN — LOSARTAN POTASSIUM 100 MG: 50 TABLET, FILM COATED ORAL at 06:25

## 2022-10-07 RX ADMIN — LEVOTHYROXINE SODIUM 100 MCG: 0.1 TABLET ORAL at 06:25

## 2022-10-07 RX ADMIN — HYDRALAZINE HYDROCHLORIDE 10 MG: 20 INJECTION INTRAMUSCULAR; INTRAVENOUS at 04:24

## 2022-10-07 RX ADMIN — HEPARIN SODIUM 5000 UNITS: 5000 INJECTION, SOLUTION INTRAVENOUS; SUBCUTANEOUS at 23:24

## 2022-10-07 RX ADMIN — ASPIRIN 81 MG 81 MG: 81 TABLET ORAL at 08:11

## 2022-10-07 RX ADMIN — NITROGLYCERIN 0.4 MG: 0.4 TABLET, ORALLY DISINTEGRATING SUBLINGUAL at 13:03

## 2022-10-07 RX ADMIN — POTASSIUM CHLORIDE 40 MEQ: 20 TABLET, EXTENDED RELEASE ORAL at 13:45

## 2022-10-07 RX ADMIN — HEPARIN SODIUM 5000 UNITS: 5000 INJECTION, SOLUTION INTRAVENOUS; SUBCUTANEOUS at 14:30

## 2022-10-07 RX ADMIN — POTASSIUM CHLORIDE 40 MEQ: 1500 TABLET, EXTENDED RELEASE ORAL at 02:30

## 2022-10-07 RX ADMIN — NITROGLYCERIN 0.5 INCH: 20 OINTMENT TOPICAL at 23:24

## 2022-10-07 RX ADMIN — NITROGLYCERIN 0.4 MG: 0.4 TABLET, ORALLY DISINTEGRATING SUBLINGUAL at 16:58

## 2022-10-07 RX ADMIN — HUMAN ALBUMIN MICROSPHERES AND PERFLUTREN 3 ML: 10; .22 INJECTION, SOLUTION INTRAVENOUS at 14:00

## 2022-10-07 RX ADMIN — HYDROCHLOROTHIAZIDE 25 MG: 25 TABLET ORAL at 06:25

## 2022-10-07 ASSESSMENT — COGNITIVE AND FUNCTIONAL STATUS - GENERAL
SUGGESTED CMS G CODE MODIFIER DAILY ACTIVITY: CH
SUGGESTED CMS G CODE MODIFIER MOBILITY: CH
DAILY ACTIVITIY SCORE: 24
MOBILITY SCORE: 24

## 2022-10-07 ASSESSMENT — ENCOUNTER SYMPTOMS
NECK PAIN: 0
COUGH: 0
HEMOPTYSIS: 0
NAUSEA: 0
CHILLS: 0
MYALGIAS: 0
DOUBLE VISION: 0
HEARTBURN: 0
DEPRESSION: 0
BLURRED VISION: 0
HEADACHES: 0
BRUISES/BLEEDS EASILY: 0
PALPITATIONS: 0
FEVER: 0
DIZZINESS: 0

## 2022-10-07 ASSESSMENT — PATIENT HEALTH QUESTIONNAIRE - PHQ9
2. FEELING DOWN, DEPRESSED, IRRITABLE, OR HOPELESS: NOT AT ALL
1. LITTLE INTEREST OR PLEASURE IN DOING THINGS: NOT AT ALL
SUM OF ALL RESPONSES TO PHQ9 QUESTIONS 1 AND 2: 0

## 2022-10-07 ASSESSMENT — PAIN DESCRIPTION - PAIN TYPE
TYPE: ACUTE PAIN
TYPE: ACUTE PAIN

## 2022-10-07 ASSESSMENT — FIBROSIS 4 INDEX: FIB4 SCORE: 0.56

## 2022-10-07 NOTE — ED NOTES
Pt resting quietly with eyes closed, on monitor, VSS, resp. Even and unlabored, repositioning self independently, in NAD

## 2022-10-07 NOTE — ASSESSMENT & PLAN NOTE
Telemetry monitoring   ASA/Statin  Troponin now 116 echo showing distal septal hypokinesis and LVOT obstruction  Cardiology was consulted had cardiac cath done showing severe CAD. Ct surgery consulted and plan is for CABG on Wednesday

## 2022-10-07 NOTE — ED NOTES
Pt given blanket, lights turned out and lowered head of stretcher for comfort, pt denies pain, on monitor, VSS, resp even and unlabored, in NAD.

## 2022-10-07 NOTE — ED NOTES
Med rec completed per patient at bedside.  Allergies reviewed with patient.  No outpatient antibiotics in the last 30 days.  Patient's preferred pharmacy: [facility] on [address].

## 2022-10-07 NOTE — ED NOTES
Pt ambulatory to restroom, upon return reported chest pain is returning, too vitals sbp 204, will medicate per MD order

## 2022-10-07 NOTE — PROGRESS NOTES
Hospital Medicine Daily Progress Note    Date of Service  10/7/2022    Chief Complaint  Clary Bah is a 56 y.o. female admitted 10/6/2022 with chest pain and uncontrolled blood pressure    Hospital Course  This is a 56-year-old female with past medical history of hypertension, palpitations on metoprolol, hypothyroidism and obesity with BMI of 40 who was admitted on 10/6/2022 with chest pain, significant for hypertensive urgency.    In the ER, she was noted to elevated blood pressures ranging from 170s to 240 systolic. She was given nitroglycerin sublingual and a nitroglycerin patch was applied with improvement in blood pressure.      UDS negative, EKG NSR. Troponin 12 on admit up peaked at 90, now downtrending 31.  Patient with heart score of 4.  ECHO noted LVEF 75%, mild LVH, distal septal hypokinesis.  Patient is for stress test tomorrow.     Interval Problem Update  In the ER, she was noted to elevated blood pressures ranging from 170s to 240 systolic. She was given nitroglycerin sublingual and a nitroglycerin patch was applied with improvement in blood pressure.      UDS negative, EKG NSR. Troponin 12 on admit up peaked at 90, now downtrending 31.  Patient with heart score of 4.  ECHO noted LVEF 75%, mild LVH, distal septal hypokinesis.  Patient is for stress test tomorrow.    I have discussed this patient's plan of care and discharge plan at IDT rounds today with Case Management, Nursing, Nursing leadership, and other members of the IDT team.    Consultants/Specialty  None    Code Status  Full Code    Disposition  Patient is not medically cleared for discharge.   Anticipate discharge to to home with close outpatient follow-up.  I have placed the appropriate orders for post-discharge needs.    Review of Systems  Review of Systems   Cardiovascular:  Positive for chest pain.   All other systems reviewed and are negative.     Physical Exam  Temp:  [36.6 °C (97.8 °F)-36.6 °C (97.9 °F)] 36.6 °C (97.9 °F)  Pulse:   [] 102  Resp:  [8-22] 16  BP: (132-259)/() 142/93  SpO2:  [92 %-97 %] 95 %    Physical Exam  Vitals and nursing note reviewed.   Constitutional:       General: She is not in acute distress.     Appearance: Normal appearance.   HENT:      Head: Normocephalic and atraumatic.      Mouth/Throat:      Mouth: Mucous membranes are moist.      Pharynx: No oropharyngeal exudate.   Eyes:      Extraocular Movements: Extraocular movements intact.      Pupils: Pupils are equal, round, and reactive to light.   Cardiovascular:      Rate and Rhythm: Normal rate and regular rhythm.      Pulses: Normal pulses.      Heart sounds: No murmur heard.    No friction rub. No gallop.   Pulmonary:      Effort: Pulmonary effort is normal. No respiratory distress.      Breath sounds: No wheezing, rhonchi or rales.   Abdominal:      General: Bowel sounds are normal. There is no distension.      Palpations: Abdomen is soft. There is no mass.      Tenderness: There is no abdominal tenderness.   Musculoskeletal:         General: No swelling or tenderness. Normal range of motion.      Cervical back: Normal range of motion. No rigidity. No muscular tenderness.      Right lower leg: No edema.      Left lower leg: No edema.   Skin:     General: Skin is warm and dry.      Capillary Refill: Capillary refill takes less than 2 seconds.      Findings: No erythema or rash.   Neurological:      General: No focal deficit present.      Mental Status: She is alert and oriented to person, place, and time.      Motor: No weakness.      Gait: Gait normal.       Fluids    Intake/Output Summary (Last 24 hours) at 10/7/2022 1459  Last data filed at 10/7/2022 1020  Gross per 24 hour   Intake 600 ml   Output --   Net 600 ml       Laboratory  Recent Labs     10/06/22  2127   WBC 10.0   RBC 4.75   HEMOGLOBIN 14.4   HEMATOCRIT 42.3   MCV 89.1   MCH 30.3   MCHC 34.0   RDW 43.8   PLATELETCT 324   MPV 10.3     Recent Labs     10/06/22  2127 10/07/22  0740   SODIUM  138 139   POTASSIUM 3.2* 3.5*   CHLORIDE 102 106   CO2 22 22   GLUCOSE 135* 138*   BUN 15 12   CREATININE 0.90 0.68   CALCIUM 9.6 9.7             Recent Labs     10/06/22  2127   TRIGLYCERIDE 198*   HDL 38*   *       Imaging  EC-ECHOCARDIOGRAM COMPLETE W/ CONT   Final Result      DX-CHEST-PORTABLE (1 VIEW)   Final Result         1.  No acute cardiopulmonary disease.      NM-CARDIAC STRESS TEST    (Results Pending)        Assessment/Plan  * Hypertensive urgency- (present on admission)  Assessment & Plan  Presents with systolic -259  IV hydralazine 10 mg every 6 hours for SBP>180  Resume home ARB/HCTZ in am       Hypokalemia  Assessment & Plan  Potassium 40 meq x 1   Check magnesium    Pain in the chest  Assessment & Plan  Telemetry monitoring   ASA/Statin  Lipid panel and a1c  Echo 2D   NTG PRN   Serial troponin  See cardiology consultation in a.m. for further diagnostic modalities with possible stress in a.m. once blood pressure controlled.    Hypothyroid- (present on admission)  Assessment & Plan  Resume levothyroxine 100 mcg daily     Essential hypertension- (present on admission)  Assessment & Plan  Resume Losartan/HCTZ in am   Hold BB for possible stress in am     Heart palpitations- (present on admission)  Assessment & Plan  Telemetry monitoring   Placed on metoprolol by her cardiologist. Will hold for now given possible stress test in am pending cardiology expert consultation.   Echo 2D        VTE prophylaxis: SCDs/TEDs and heparin ppx    I have performed a physical exam and reviewed and updated ROS and Plan today (10/7/2022). In review of yesterday's note (10/6/2022), there are no changes except as documented above.

## 2022-10-07 NOTE — HOSPITAL COURSE
This is a 56-year-old female with past medical history of hypertension, palpitations on metoprolol, hypothyroidism and obesity with BMI of 40 who was admitted on 10/6/2022 with chest pain, significant for hypertensive urgency.    In the ER, she was noted to elevated blood pressures ranging from 170s to 240 systolic. She was given nitroglycerin sublingual and a nitroglycerin patch was applied with improvement in blood pressure.      UDS negative, EKG NSR. Troponin 12 on admit up peaked at 90, now downtrending 31.  Patient with heart score of 4.  ECHO noted LVEF 75%, mild LVH, distal septal hypokinesis.  Patient is for stress test tomorrow.

## 2022-10-07 NOTE — ED NOTES
Report given to BOSSMAN Lion. Pt room isn't clean yet, Amisha will come get patient once room is clean
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
## 2022-10-07 NOTE — ASSESSMENT & PLAN NOTE
Presents with systolic -259  IV hydralazine 10 mg every 6 hours for SBP>180  Resume home ARB/HCTZ  Also on carvedilol

## 2022-10-07 NOTE — ED NOTES
Pt resting on stretcher talking with son at b/s, on monitor, chest pain currently resolved, respirations even and unlabored, in NAD.

## 2022-10-07 NOTE — PROGRESS NOTES
Pt transported to 707 - assumed pt care. Pt resting in bed, no c/o of chest pain. Pt A&O x 4. On room air. Fall precautions in place, call light and belongings within reach, bed in lowest position. No signs of distress.

## 2022-10-07 NOTE — ED TRIAGE NOTES
Chief Complaint   Patient presents with    Chest Pain     1-2 weeks mid-sternal pain, feels like indigestion, pain has been intermittent but tonight couldn't get pain to stop, denies radiation, deep breathing can make it feel better        Pain resolved PTA, was hypertensive on scene /113, given ASA 324mg PO and NTG 1 tab SL in route.

## 2022-10-07 NOTE — ED NOTES
Pt ambulated independently to the bathroom and back to room, monitor reapplied, warm blanket given

## 2022-10-07 NOTE — ASSESSMENT & PLAN NOTE
Telemetry monitoring   Placed on metoprolol by her cardiologist. Will hold for now given possible stress test in am pending cardiology expert consultation.   Echo 2D

## 2022-10-07 NOTE — ED PROVIDER NOTES
ED Provider Note    CHIEF COMPLAINT  Chief Complaint   Patient presents with    Chest Pain     1-2 weeks mid-sternal pain, feels like indigestion, pain has been intermittent but tonight couldn't get pain to stop, denies radiation, deep breathing can make it feel better       HPI  Clary Bah is a 56 y.o. female who presents for evaluation of chest pain which the patient notes has been going on for almost 2 weeks intermittently.  She notes it starts in her substernal region and tends to radiate to her lower neck and is associated with sweating, and shortness of breath.  Patient notes she has a history of SVT and the symptoms feel similar but she does not feel her heart racing.  She notes no nausea or vomiting and no recent fevers or chills.  She notes that she has had up to 3 episodes per day which only last a few minutes.  They come on suddenly and go away just as suddenly.  She notes today she had pain for over half an hour and got worried enough to call the ambulance.  They gave her nitroglycerin which seemed to resolve the symptoms.  There is noted that she was hypertensive as well by EMS.  Patient arrives symptom-free.    REVIEW OF SYSTEMS  Constitutional: No fevers or chills  Skin: No rashes  HEENT: No sore throat, runny nose  Neck: No neck pain  Chest: Chest pain, no rashes  Pulm: Shortness of breath noted with the episodes.  No cough or wheezing noted.  Gastrointestinal: No nausea, vomiting, diarrhea, constipation, bloating, melena, hematochezia or abdominal pain.  Genitourinary: No dysuria or hematuria  Musculoskeletal: No pain, swelling, weakness  Neurologic: No numbness, tingling, or focal motor weakness to extremities.  Heme: No bleeding or bruising problems.   Immuno: No hx of recurrent infections    PAST FAM HISTORY  Family History   Problem Relation Age of Onset    Arthritis Mother     Hypertension Mother     Heart Disease Father 67        heart attack    Diabetes Father     Hypertension Sister   "      PAST MEDICAL HISTORY   has a past medical history of Anesthesia, Heart palpitations, Hypertension, Hypothyroid, and Vitamin D deficiency.    SOCIAL HISTORY  Social History     Tobacco Use    Smoking status: Never    Smokeless tobacco: Never   Vaping Use    Vaping Use: Never used   Substance and Sexual Activity    Alcohol use: Yes     Comment: occ    Drug use: No    Sexual activity: Yes     Partners: Male     Comment: vasectomy       SURGICAL HISTORY   has a past surgical history that includes biopsy general (10/20/08); aaron by laparoscopy; hysteroscopy with video operative (10/20/08); gyn surgery (1995); and rectocele repair (3/15/2011).    CURRENT MEDICATIONS  Home Medications       Reviewed by Madhuri Coronel R.N. (Registered Nurse) on 10/06/22 at 2131  Med List Status: Not Addressed     Medication Last Dose Status   fluticasone (FLONASE) 50 MCG/ACT nasal spray  Active   levothyroxine (SYNTHROID) 100 MCG Tab  Active   losartan-hydrochlorothiazide (HYZAAR) 100-25 MG per tablet  Active   metoprolol SR (TOPROL XL) 100 MG TABLET SR 24 HR  Active                    ALLERGIES  Allergies   Allergen Reactions    Ace Inhibitors Cough    Atorvastatin      Statin not indicted with coronary calcium score of 0.       PHYSICAL EXAM  VITAL SIGNS: BP (!) 160/79   Pulse 84   Temp 36.6 °C (97.8 °F) (Temporal)   Resp 18   Ht 1.676 m (5' 6\")   Wt 113 kg (249 lb)   LMP 06/01/2020 (Approximate)   SpO2 92%   BMI 40.19 kg/m²    Gen: Alert in no apparent distress.  HEENT: No signs of trauma, Bilateral external ears normal, Nose normal. Conjunctiva normal, Non-icteric.   Cardiovascular: Regular rate and rhythm, no murmurs.  Capillary refill less than 3 seconds to all extremities, 2+ distal pulses.  Thorax & Lungs: Normal breath sounds, No respiratory distress, No wheezing bilateral chest rise  Abdomen: Bowel sounds normal, Soft, No tenderness, No masses, No pulsatile masses. No Guarding or rebound  Skin: Warm, Dry, No " erythema, No rash noted to exposed areas.   Extremities: Intact distal pulses, No edema  Neurologic: Alert , no facial droop, grossly normal coordination and strength  Psychiatric: Affect pleasant      LABS  Results for orders placed or performed during the hospital encounter of 10/06/22   CBC with Differential   Result Value Ref Range    WBC 10.0 4.8 - 10.8 K/uL    RBC 4.75 4.20 - 5.40 M/uL    Hemoglobin 14.4 12.0 - 16.0 g/dL    Hematocrit 42.3 37.0 - 47.0 %    MCV 89.1 81.4 - 97.8 fL    MCH 30.3 27.0 - 33.0 pg    MCHC 34.0 33.6 - 35.0 g/dL    RDW 43.8 35.9 - 50.0 fL    Platelet Count 324 164 - 446 K/uL    MPV 10.3 9.0 - 12.9 fL    Neutrophils-Polys 62.10 44.00 - 72.00 %    Lymphocytes 28.10 22.00 - 41.00 %    Monocytes 6.40 0.00 - 13.40 %    Eosinophils 2.60 0.00 - 6.90 %    Basophils 0.50 0.00 - 1.80 %    Immature Granulocytes 0.30 0.00 - 0.90 %    Nucleated RBC 0.00 /100 WBC    Neutrophils (Absolute) 6.22 2.00 - 7.15 K/uL    Lymphs (Absolute) 2.82 1.00 - 4.80 K/uL    Monos (Absolute) 0.64 0.00 - 0.85 K/uL    Eos (Absolute) 0.26 0.00 - 0.51 K/uL    Baso (Absolute) 0.05 0.00 - 0.12 K/uL    Immature Granulocytes (abs) 0.03 0.00 - 0.11 K/uL    NRBC (Absolute) 0.00 K/uL   Complete Metabolic Panel (CMP)   Result Value Ref Range    Sodium 138 135 - 145 mmol/L    Potassium 3.2 (L) 3.6 - 5.5 mmol/L    Chloride 102 96 - 112 mmol/L    Co2 22 20 - 33 mmol/L    Anion Gap 14.0 7.0 - 16.0    Glucose 135 (H) 65 - 99 mg/dL    Bun 15 8 - 22 mg/dL    Creatinine 0.90 0.50 - 1.40 mg/dL    Calcium 9.6 8.5 - 10.5 mg/dL    AST(SGOT) 17 12 - 45 U/L    ALT(SGPT) 27 2 - 50 U/L    Alkaline Phosphatase 100 (H) 30 - 99 U/L    Total Bilirubin 0.3 0.1 - 1.5 mg/dL    Albumin 4.0 3.2 - 4.9 g/dL    Total Protein 6.5 6.0 - 8.2 g/dL    Globulin 2.5 1.9 - 3.5 g/dL    A-G Ratio 1.6 g/dL   Troponin   Result Value Ref Range    Troponin T 12 6 - 19 ng/L   ESTIMATED GFR   Result Value Ref Range    GFR (CKD-EPI) 75 >60 mL/min/1.73 m 2   Troponin in two  (2) hours   Result Value Ref Range    Troponin T 61 (H) 6 - 19 ng/L   EKG   Result Value Ref Range    Report       [facility]    Test Date:  2022-10-06  Pt Name:    PATEL BAUTISTA       Department: ER  MRN:        [id number]                      Room:        07  Gender:     Female                       Technician: [id number]  :        1965                   Requested By:ER TRIAGE PROTOCOL  Order #:    [id number]                    Reading MD:    Measurements  Intervals                                Axis  Rate:       74                           P:          48  MI:         161                          QRS:        8  QRSD:       91                           T:          75  QT:         372  QTc:        413    Interpretive Statements  Sinus rhythm  Borderline repolarization abnormality  Compared to ECG 2011 08:09:30  No significant changes         RADIOLOGY  DX-CHEST-PORTABLE (1 VIEW)   Final Result         1.  No acute cardiopulmonary disease.      EC-ECHOCARDIOGRAM COMPLETE W/O CONT    (Results Pending)     Critical Care Note  Upon my evaluation, this patient had high probability of imminent and life-threatening deterioration due to hypertensive emergency with elevated troponin and abnormal EKG, which required my direct attention, intervention, and personal management. I personally provided 35 minutes of critical care time exclusive of time spent on separately billable procedures. Time includes review of laboratory data, radiology results, discussion with consultants, and monitoring for potential decompensation.      COURSE & MEDICAL DECISION MAKING  Patient arrives for evaluation of symptoms that are concerning for unstable angina or hypertensive urgency/emergency.  Patient is noted to be moderately hypertensive currently but has no symptoms.  Will await serum and x-ray evaluation.  Already notable that the patient will likely be admitted as her EKG has inverted T  waves in the anterior leads.    Patient had an episode of chest pain.  I evaluated her at bedside and she was noted to be markedly hypertensive.  This was brought down immediately with oral nitroglycerin and then a nitroglycerin patch.  This seems to indicate the patient is having hypertensive emergency with possible unstable angina or demand ischemia.  Patient was discussed with hospitalist who will admit the patient primarily.    FINAL IMPRESSION  1. Acute chest pain    2. Hypertensive emergency        Electronically signed by: Jin Pacheco M.D., 10/6/2022 9:35 PM

## 2022-10-08 ENCOUNTER — APPOINTMENT (OUTPATIENT)
Dept: RADIOLOGY | Facility: MEDICAL CENTER | Age: 57
DRG: 233 | End: 2022-10-08
Attending: GENERAL PRACTICE
Payer: COMMERCIAL

## 2022-10-08 ENCOUNTER — APPOINTMENT (OUTPATIENT)
Dept: CARDIOLOGY | Facility: MEDICAL CENTER | Age: 57
DRG: 233 | End: 2022-10-08
Attending: INTERNAL MEDICINE
Payer: COMMERCIAL

## 2022-10-08 LAB
ANION GAP SERPL CALC-SCNC: 13 MMOL/L (ref 7–16)
APTT PPP: 26 SEC (ref 24.7–36)
BASOPHILS # BLD AUTO: 0.5 % (ref 0–1.8)
BASOPHILS # BLD: 0.04 K/UL (ref 0–0.12)
BUN SERPL-MCNC: 13 MG/DL (ref 8–22)
CALCIUM SERPL-MCNC: 9.7 MG/DL (ref 8.5–10.5)
CHLORIDE SERPL-SCNC: 105 MMOL/L (ref 96–112)
CO2 SERPL-SCNC: 18 MMOL/L (ref 20–33)
CREAT SERPL-MCNC: 0.61 MG/DL (ref 0.5–1.4)
EKG IMPRESSION: NORMAL
EKG IMPRESSION: NORMAL
EOSINOPHIL # BLD AUTO: 0.06 K/UL (ref 0–0.51)
EOSINOPHIL NFR BLD: 0.7 % (ref 0–6.9)
ERYTHROCYTE [DISTWIDTH] IN BLOOD BY AUTOMATED COUNT: 45.1 FL (ref 35.9–50)
GFR SERPLBLD CREATININE-BSD FMLA CKD-EPI: 105 ML/MIN/1.73 M 2
GLUCOSE SERPL-MCNC: 136 MG/DL (ref 65–99)
HCT VFR BLD AUTO: 44.1 % (ref 37–47)
HGB BLD-MCNC: 15 G/DL (ref 12–16)
IMM GRANULOCYTES # BLD AUTO: 0.03 K/UL (ref 0–0.11)
IMM GRANULOCYTES NFR BLD AUTO: 0.3 % (ref 0–0.9)
INR PPP: 1.03 (ref 0.87–1.13)
LYMPHOCYTES # BLD AUTO: 1.63 K/UL (ref 1–4.8)
LYMPHOCYTES NFR BLD: 18.6 % (ref 22–41)
MAGNESIUM SERPL-MCNC: 1.8 MG/DL (ref 1.5–2.5)
MCH RBC QN AUTO: 30.6 PG (ref 27–33)
MCHC RBC AUTO-ENTMCNC: 34 G/DL (ref 33.6–35)
MCV RBC AUTO: 90 FL (ref 81.4–97.8)
MONOCYTES # BLD AUTO: 0.74 K/UL (ref 0–0.85)
MONOCYTES NFR BLD AUTO: 8.4 % (ref 0–13.4)
NEUTROPHILS # BLD AUTO: 6.26 K/UL (ref 2–7.15)
NEUTROPHILS NFR BLD: 71.5 % (ref 44–72)
NRBC # BLD AUTO: 0 K/UL
NRBC BLD-RTO: 0 /100 WBC
PLATELET # BLD AUTO: 310 K/UL (ref 164–446)
PMV BLD AUTO: 10.3 FL (ref 9–12.9)
POTASSIUM SERPL-SCNC: 4.1 MMOL/L (ref 3.6–5.5)
PROTHROMBIN TIME: 13.4 SEC (ref 12–14.6)
RBC # BLD AUTO: 4.9 M/UL (ref 4.2–5.4)
SODIUM SERPL-SCNC: 136 MMOL/L (ref 135–145)
TROPONIN T SERPL-MCNC: 104 NG/L (ref 6–19)
TROPONIN T SERPL-MCNC: 106 NG/L (ref 6–19)
TROPONIN T SERPL-MCNC: 116 NG/L (ref 6–19)
UFH PPP CHRO-ACNC: <0.1 IU/ML
WBC # BLD AUTO: 8.8 K/UL (ref 4.8–10.8)

## 2022-10-08 PROCEDURE — 700102 HCHG RX REV CODE 250 W/ 637 OVERRIDE(OP)

## 2022-10-08 PROCEDURE — 85025 COMPLETE CBC W/AUTO DIFF WBC: CPT

## 2022-10-08 PROCEDURE — A9270 NON-COVERED ITEM OR SERVICE: HCPCS | Performed by: GENERAL PRACTICE

## 2022-10-08 PROCEDURE — 83735 ASSAY OF MAGNESIUM: CPT

## 2022-10-08 PROCEDURE — 700102 HCHG RX REV CODE 250 W/ 637 OVERRIDE(OP): Performed by: INTERNAL MEDICINE

## 2022-10-08 PROCEDURE — 700102 HCHG RX REV CODE 250 W/ 637 OVERRIDE(OP): Performed by: GENERAL PRACTICE

## 2022-10-08 PROCEDURE — 99223 1ST HOSP IP/OBS HIGH 75: CPT | Performed by: INTERNAL MEDICINE

## 2022-10-08 PROCEDURE — A9270 NON-COVERED ITEM OR SERVICE: HCPCS | Performed by: STUDENT IN AN ORGANIZED HEALTH CARE EDUCATION/TRAINING PROGRAM

## 2022-10-08 PROCEDURE — 700101 HCHG RX REV CODE 250

## 2022-10-08 PROCEDURE — A9270 NON-COVERED ITEM OR SERVICE: HCPCS | Performed by: INTERNAL MEDICINE

## 2022-10-08 PROCEDURE — 93010 ELECTROCARDIOGRAM REPORT: CPT | Performed by: INTERNAL MEDICINE

## 2022-10-08 PROCEDURE — 85610 PROTHROMBIN TIME: CPT

## 2022-10-08 PROCEDURE — A9270 NON-COVERED ITEM OR SERVICE: HCPCS

## 2022-10-08 PROCEDURE — 36415 COLL VENOUS BLD VENIPUNCTURE: CPT

## 2022-10-08 PROCEDURE — 85520 HEPARIN ASSAY: CPT

## 2022-10-08 PROCEDURE — 700102 HCHG RX REV CODE 250 W/ 637 OVERRIDE(OP): Performed by: STUDENT IN AN ORGANIZED HEALTH CARE EDUCATION/TRAINING PROGRAM

## 2022-10-08 PROCEDURE — 99232 SBSQ HOSP IP/OBS MODERATE 35: CPT | Performed by: INTERNAL MEDICINE

## 2022-10-08 PROCEDURE — 770020 HCHG ROOM/CARE - TELE (206)

## 2022-10-08 PROCEDURE — 700111 HCHG RX REV CODE 636 W/ 250 OVERRIDE (IP)

## 2022-10-08 PROCEDURE — 700111 HCHG RX REV CODE 636 W/ 250 OVERRIDE (IP): Performed by: GENERAL PRACTICE

## 2022-10-08 PROCEDURE — 93005 ELECTROCARDIOGRAM TRACING: CPT

## 2022-10-08 PROCEDURE — 80048 BASIC METABOLIC PNL TOTAL CA: CPT

## 2022-10-08 PROCEDURE — 85730 THROMBOPLASTIN TIME PARTIAL: CPT

## 2022-10-08 PROCEDURE — 84484 ASSAY OF TROPONIN QUANT: CPT

## 2022-10-08 RX ORDER — LABETALOL HYDROCHLORIDE 5 MG/ML
20 INJECTION, SOLUTION INTRAVENOUS ONCE
Status: DISCONTINUED | OUTPATIENT
Start: 2022-10-08 | End: 2022-10-08

## 2022-10-08 RX ORDER — LABETALOL HYDROCHLORIDE 5 MG/ML
20 INJECTION, SOLUTION INTRAVENOUS EVERY 4 HOURS PRN
Status: DISCONTINUED | OUTPATIENT
Start: 2022-10-08 | End: 2022-10-12

## 2022-10-08 RX ORDER — MORPHINE SULFATE 4 MG/ML
1 INJECTION INTRAVENOUS
Status: DISCONTINUED | OUTPATIENT
Start: 2022-10-08 | End: 2022-10-12

## 2022-10-08 RX ORDER — LABETALOL HYDROCHLORIDE 5 MG/ML
10 INJECTION, SOLUTION INTRAVENOUS EVERY 4 HOURS PRN
Status: DISCONTINUED | OUTPATIENT
Start: 2022-10-08 | End: 2022-10-08

## 2022-10-08 RX ORDER — ACETAMINOPHEN 325 MG/1
650 TABLET ORAL EVERY 4 HOURS PRN
Status: DISCONTINUED | OUTPATIENT
Start: 2022-10-08 | End: 2022-10-12

## 2022-10-08 RX ORDER — AMLODIPINE BESYLATE 10 MG/1
10 TABLET ORAL
Status: DISCONTINUED | OUTPATIENT
Start: 2022-10-08 | End: 2022-10-11

## 2022-10-08 RX ORDER — CLONIDINE HYDROCHLORIDE 0.1 MG/1
0.1 TABLET ORAL ONCE
Status: COMPLETED | OUTPATIENT
Start: 2022-10-08 | End: 2022-10-08

## 2022-10-08 RX ORDER — CARVEDILOL 3.12 MG/1
3.12 TABLET ORAL 2 TIMES DAILY WITH MEALS
Status: DISCONTINUED | OUTPATIENT
Start: 2022-10-08 | End: 2022-10-08

## 2022-10-08 RX ORDER — CARVEDILOL 6.25 MG/1
6.25 TABLET ORAL 2 TIMES DAILY WITH MEALS
Status: DISCONTINUED | OUTPATIENT
Start: 2022-10-08 | End: 2022-10-08

## 2022-10-08 RX ORDER — CARVEDILOL 12.5 MG/1
12.5 TABLET ORAL 2 TIMES DAILY WITH MEALS
Status: DISCONTINUED | OUTPATIENT
Start: 2022-10-09 | End: 2022-10-09

## 2022-10-08 RX ADMIN — HEPARIN SODIUM 5000 UNITS: 5000 INJECTION, SOLUTION INTRAVENOUS; SUBCUTANEOUS at 21:20

## 2022-10-08 RX ADMIN — ASPIRIN 81 MG 81 MG: 81 TABLET ORAL at 05:25

## 2022-10-08 RX ADMIN — LABETALOL HYDROCHLORIDE 20 MG: 5 INJECTION INTRAVENOUS at 02:51

## 2022-10-08 RX ADMIN — HYDROCHLOROTHIAZIDE 25 MG: 25 TABLET ORAL at 05:25

## 2022-10-08 RX ADMIN — CLONIDINE HYDROCHLORIDE 0.1 MG: 0.1 TABLET ORAL at 01:05

## 2022-10-08 RX ADMIN — MORPHINE SULFATE 1 MG: 4 INJECTION, SOLUTION INTRAMUSCULAR; INTRAVENOUS at 02:42

## 2022-10-08 RX ADMIN — HEPARIN SODIUM 5000 UNITS: 5000 INJECTION, SOLUTION INTRAVENOUS; SUBCUTANEOUS at 14:04

## 2022-10-08 RX ADMIN — HEPARIN SODIUM 5000 UNITS: 5000 INJECTION, SOLUTION INTRAVENOUS; SUBCUTANEOUS at 05:25

## 2022-10-08 RX ADMIN — CARVEDILOL 6.25 MG: 6.25 TABLET, FILM COATED ORAL at 14:04

## 2022-10-08 RX ADMIN — LEVOTHYROXINE SODIUM 100 MCG: 0.1 TABLET ORAL at 05:25

## 2022-10-08 RX ADMIN — ACETAMINOPHEN 650 MG: 325 TABLET, FILM COATED ORAL at 07:45

## 2022-10-08 RX ADMIN — LOSARTAN POTASSIUM 100 MG: 50 TABLET, FILM COATED ORAL at 05:25

## 2022-10-08 RX ADMIN — LABETALOL HYDROCHLORIDE 20 MG: 5 INJECTION INTRAVENOUS at 14:42

## 2022-10-08 RX ADMIN — AMLODIPINE BESYLATE 10 MG: 10 TABLET ORAL at 14:53

## 2022-10-08 ASSESSMENT — FIBROSIS 4 INDEX: FIB4 SCORE: 0.58

## 2022-10-08 ASSESSMENT — PAIN DESCRIPTION - PAIN TYPE: TYPE: ACUTE PAIN

## 2022-10-08 NOTE — PROGRESS NOTES
[id number] Marry from Lab called with critical result of troponin 104 at 0415. Critical lab result read back to Marry [id number].   Tania Briscoe NP notified of critical lab result at 0420.  Critical lab result read back by Tania Briscoe.

## 2022-10-08 NOTE — CARE PLAN
The patient is Stable - Low risk of patient condition declining or worsening    Shift Goals  Clinical Goals: stress test, watch VS, pain mgn  Patient Goals: pain mng  Family Goals: neda    Progress made toward(s) clinical / shift goals:      Problem: Knowledge Deficit - Standard  Goal: Patient and family/care givers will demonstrate understanding of plan of care, disease process/condition, diagnostic tests and medications  Outcome: Progressing     Problem: Pain - Standard  Goal: Alleviation of pain or a reduction in pain to the patient’s comfort goal  Outcome: Progressing       Patient is not progressing towards the following goals:

## 2022-10-08 NOTE — CARE PLAN
The patient is Watcher - Medium risk of patient condition declining or worsening    Shift Goals  Clinical Goals: echo  Patient Goals: rest, free of chest pain  Family Goals: neda    Progress made toward(s) clinical / shift goals:    Problem: Knowledge Deficit - Standard  Goal: Patient and family/care givers will demonstrate understanding of plan of care, disease process/condition, diagnostic tests and medications  Outcome: Progressing       Patient is not progressing towards the following goals:

## 2022-10-08 NOTE — PROGRESS NOTES
Hospital Medicine Daily Progress Note    Date of Service  10/8/2022    Chief Complaint  Clary Bah is a 56 y.o. female admitted 10/6/2022 with chest pain and uncontrolled blood pressure    Hospital Course  This is a 56-year-old female with past medical history of hypertension, palpitations on metoprolol, hypothyroidism and obesity with BMI of 40 who was admitted on 10/6/2022 with chest pain, significant for hypertensive urgency.    In the ER, she was noted to elevated blood pressures ranging from 170s to 240 systolic. She was given nitroglycerin sublingual and a nitroglycerin patch was applied with improvement in blood pressure.      UDS negative, EKG NSR. Troponin 12 on admit up peaked at 90, now downtrending 31.  Patient with heart score of 4.  ECHO noted LVEF 75%, mild LVH, distal septal hypokinesis.  Patient is for stress test tomorrow.     Interval Problem Update  Patient seen and examined, no overnight events, no nausea or vomiting, her troponin is up to 116 from 12 it was on admission    Her echo shows distal hypokinesis and also LVOT obstruction.  I have consulted cardiology and discussed the case and plan is for patient to have cardiac cath appreciate rec.     I have discussed this patient's plan of care and discharge plan at IDT rounds today with Case Management, Nursing, Nursing leadership, and other members of the IDT team.    Consultants/Specialty  cardiology    Code Status  Full Code    Disposition  Patient is not medically cleared for discharge.   Anticipate discharge to to home with close outpatient follow-up.  I have placed the appropriate orders for post-discharge needs.    Review of Systems  Review of Systems   Cardiovascular:  Positive for chest pain.   All other systems reviewed and are negative.     Physical Exam  Temp:  [36.6 °C (97.9 °F)-37 °C (98.6 °F)] 36.7 °C (98.1 °F)  Pulse:  [] 85  Resp:  [16-20] 17  BP: (125-217)/() 125/78  SpO2:  [93 %-95 %] 95 %    Physical  Exam  Vitals and nursing note reviewed.   Constitutional:       General: She is not in acute distress.     Appearance: Normal appearance.   HENT:      Head: Normocephalic and atraumatic.      Mouth/Throat:      Mouth: Mucous membranes are moist.      Pharynx: No oropharyngeal exudate.   Eyes:      Extraocular Movements: Extraocular movements intact.      Pupils: Pupils are equal, round, and reactive to light.   Cardiovascular:      Rate and Rhythm: Normal rate and regular rhythm.      Pulses: Normal pulses.      Heart sounds: No murmur heard.    No friction rub. No gallop.   Pulmonary:      Effort: Pulmonary effort is normal. No respiratory distress.      Breath sounds: No wheezing, rhonchi or rales.   Abdominal:      General: Bowel sounds are normal. There is no distension.      Palpations: Abdomen is soft. There is no mass.      Tenderness: There is no abdominal tenderness.   Musculoskeletal:         General: No swelling or tenderness. Normal range of motion.      Cervical back: Normal range of motion. No rigidity. No muscular tenderness.      Right lower leg: No edema.      Left lower leg: No edema.   Skin:     General: Skin is warm and dry.      Capillary Refill: Capillary refill takes less than 2 seconds.      Findings: No erythema or rash.   Neurological:      General: No focal deficit present.      Mental Status: She is alert and oriented to person, place, and time.      Motor: No weakness.      Gait: Gait normal.       Fluids  No intake or output data in the 24 hours ending 10/08/22 1149      Laboratory  Recent Labs     10/06/22  2127 10/08/22  0309   WBC 10.0 8.8   RBC 4.75 4.90   HEMOGLOBIN 14.4 15.0   HEMATOCRIT 42.3 44.1   MCV 89.1 90.0   MCH 30.3 30.6   MCHC 34.0 34.0   RDW 43.8 45.1   PLATELETCT 324 310   MPV 10.3 10.3     Recent Labs     10/06/22  2127 10/07/22  0740 10/08/22  0309   SODIUM 138 139 136   POTASSIUM 3.2* 3.5* 4.1   CHLORIDE 102 106 105   CO2 22 22 18*   GLUCOSE 135* 138* 136*   BUN 15  12 13   CREATININE 0.90 0.68 0.61   CALCIUM 9.6 9.7 9.7     Recent Labs     10/08/22  0931   APTT 26.0   INR 1.03         Recent Labs     10/06/22  2127   TRIGLYCERIDE 198*   HDL 38*   *       Imaging  EC-ECHOCARDIOGRAM COMPLETE W/ CONT   Final Result      DX-CHEST-PORTABLE (1 VIEW)   Final Result         1.  No acute cardiopulmonary disease.      CL-LEFT HEART CATHETERIZATION WITH POSSIBLE INTERVENTION    (Results Pending)        Assessment/Plan  * Hypertensive urgency- (present on admission)  Assessment & Plan  Presents with systolic -259  IV hydralazine 10 mg every 6 hours for SBP>180  Resume home ARB/HCTZ  Also on carvedilol        Hypokalemia  Assessment & Plan  Improved monitor and replete as needed     Pain in the chest  Assessment & Plan  Telemetry monitoring   ASA/Statin  Troponin now 116 echo showing distal septal hypokinesis and LVOT obstruction  Cardiology has been consulted, plan for cardiac cath tomorrow   Appreciate rec.       Hypothyroid- (present on admission)  Assessment & Plan  Resume levothyroxine 100 mcg daily     Essential hypertension- (present on admission)  Assessment & Plan  Resume Losartan/HCTZ in am   Hold BB for possible stress in am     Heart palpitations- (present on admission)  Assessment & Plan  Telemetry monitoring   Placed on metoprolol by her cardiologist. Will hold for now given possible stress test in am pending cardiology expert consultation.   Echo 2D        VTE prophylaxis: SCDs/TEDs and heparin ppx    I have performed a physical exam and reviewed and updated ROS and Plan today (10/8/2022). In review of yesterday's note (10/7/2022), there are no changes except as documented above.

## 2022-10-08 NOTE — CONSULTS
"Reason for Consult:  Asked by Dr Landon Haider M.D. to see this patient with chest pain    CC:   Chief Complaint   Patient presents with    Chest Pain     1-2 weeks mid-sternal pain, feels like indigestion, pain has been intermittent but tonight couldn't get pain to stop, denies radiation, deep breathing can make it feel better       HPI:      56 year old woman with PMH HTN, palpitations, hypothyroid, high stress at home due to loss of brother and mother presents with chest pressure found nstemi and hypertensive emergency. Describes intermittent angina past week or so with episodes lasing upward of 5 min. While admitted has had short episodes she days. No other complaints. Denies toxic social habits. Consult for troponin uptrend.     Medications / Drug list prior to admission:  No current facility-administered medications on file prior to encounter.     Current Outpatient Medications on File Prior to Encounter   Medication Sig Dispense Refill    fluticasone (FLONASE) 50 MCG/ACT nasal spray Administer 1 Spray into each nostril 1 time a day as needed (Seasonal Allergies).      Multiple Vitamins-Minerals (MULTIVITAMIN & MINERAL PO) Take 1 Tablet by mouth every morning. \"MagBlue\"      metoprolol SR (TOPROL XL) 100 MG TABLET SR 24 HR Take 1 Tablet by mouth every day. 90 Tablet 3    losartan-hydrochlorothiazide (HYZAAR) 100-25 MG per tablet Take 1 Tablet by mouth every day. 90 Tablet 3    levothyroxine (SYNTHROID) 100 MCG Tab Take 1 Tablet by mouth every morning on an empty stomach. 90 Tablet 3       Current list of administered Medications:    Current Facility-Administered Medications:     morphine 4 MG/ML injection 1 mg, 1 mg, Intravenous, Q3HRS PRN, Roya Briscoe, A.P.R.N., 1 mg at 10/08/22 0242    labetalol (NORMODYNE/TRANDATE) injection 20 mg, 20 mg, Intravenous, Q4HRS PRN, Roya Briscoe, A.P.R.N., 20 mg at 10/08/22 0251    acetaminophen (Tylenol) tablet 650 mg, 650 mg, Oral, Q4HRS PRN, Landon Haider M.D., 650 mg at 10/08/22 " 0745    carvedilol (COREG) tablet 3.125 mg, 3.125 mg, Oral, BID WITH MEALS, Landon Haider M.D.    losartan (COZAAR) tablet 100 mg, 100 mg, Oral, Q DAY, 100 mg at 10/08/22 0525 **AND** hydroCHLOROthiazide (HYDRODIURIL) tablet 25 mg, 25 mg, Oral, Q DAY, Addy Pineda M.D., 25 mg at 10/08/22 0525    [Held by provider] atorvastatin (LIPITOR) tablet 80 mg, 80 mg, Oral, Q EVENING, Addy Pineda M.D.    aspirin (ASA) chewable tab 81 mg, 81 mg, Oral, DAILY, Mary Villafana D.OAugusta, 81 mg at 10/08/22 0525    heparin injection 5,000 Units, 5,000 Units, Subcutaneous, Q8HRS, Mary Villafana D.O., 5,000 Units at 10/08/22 0525    levothyroxine (SYNTHROID) tablet 100 mcg, 100 mcg, Oral, AM ES, Addy Pineda M.D., 100 mcg at 10/08/22 0525    Past Medical History:   Diagnosis Date    Anesthesia     slow to wake up    Heart palpitations     Hypertension     Hypothyroid     Vitamin D deficiency        Past Surgical History:   Procedure Laterality Date    RECTOCELE REPAIR  3/15/2011    Performed by MARK RAMÍREZ at SURGERY SAME DAY [facility]    BIOPSY GENERAL  10/20/08    Performed by MARK RAMÍREZ at SURGERY SAME DAY [facility]    HYSTEROSCOPY WITH VIDEO OPERATIVE  10/20/08    Performed by MARK RAMÍREZ at SURGERY SAME DAY [facility]    GYN SURGERY          BRIANNE BY LAPAROSCOPY         Family History   Problem Relation Age of Onset    Arthritis Mother     Hypertension Mother     Heart Disease Father 67        heart attack    Diabetes Father     Hypertension Sister      Patient family history was personally reviewed, no pertinent family history to current presentation    Social History     Socioeconomic History    Marital status:      Spouse name: Not on file    Number of children: Not on file    Years of education: Not on file    Highest education level: Not on file   Occupational History    Not on file   Tobacco Use    Smoking status: Never    Smokeless tobacco: Never   Vaping Use    Vaping  Use: Never used   Substance and Sexual Activity    Alcohol use: Yes     Comment: occ    Drug use: No    Sexual activity: Yes     Partners: Male     Comment: vasectomy   Other Topics Concern    Not on file   Social History Narrative    Not on file     Social Determinants of Health     Financial Resource Strain: Not on file   Food Insecurity: Not on file   Transportation Needs: Not on file   Physical Activity: Not on file   Stress: Not on file   Social Connections: Not on file   Intimate Partner Violence: Not on file   Housing Stability: Not on file       ALLERGIES:  Allergies   Allergen Reactions    Ace Inhibitors Cough    Atorvastatin Unspecified     Statin not indicted with coronary calcium score of 0.       Review of systems:  A complete review of symptoms was reviewed with patient. This is reviewed in H&P and PMH. ALL OTHERS reviewed and negative    Physical exam:  Patient Vitals for the past 24 hrs:   BP Temp Temp src Pulse Resp SpO2 Height Weight   10/08/22 0809 125/78 -- -- 85 17 -- -- --   10/08/22 0519 128/82 36.7 °C (98.1 °F) Temporal (!) 102 16 -- -- --   10/08/22 0341 132/58 -- -- 87 -- -- -- --   10/08/22 0225 (!) 210/96 -- -- (!) 124 -- -- -- --   10/08/22 0105 (!) 210/104 -- -- (!) 120 -- -- -- --   10/07/22 2358 (!) 208/106 -- -- (!) 113 -- -- -- --   10/07/22 2319 (!) 207/137 36.6 °C (97.9 °F) Temporal (!) 120 20 95 % -- --   10/07/22 2019 (!) 169/108 -- -- -- -- -- -- --   10/07/22 2006 (!) 187/110 37 °C (98.6 °F) Temporal (!) 117 18 93 % -- --   10/07/22 1719 (!) 156/83 -- -- -- -- -- -- --   10/07/22 1700 (!) 202/106 -- -- -- -- -- -- --   10/07/22 1553 (!) 161/100 -- -- -- -- -- -- --   10/07/22 1544 -- -- -- -- -- 95 % -- --   10/07/22 1543 (!) 217/116 -- -- -- -- 95 % -- --   10/07/22 1542 -- -- -- -- -- 95 % -- --   10/07/22 1541 (!) 217/116 37 °C (98.6 °F) Temporal 100 18 95 % -- --   10/07/22 1339 (!) 142/93 -- -- -- -- -- -- --   10/07/22 1338 (!) 142/93 -- -- -- -- -- -- --   10/07/22 1325  "(!)  -- -- -- -- -- -- --   10/07/22 1304 (!)  -- -- -- -- -- -- --   10/07/22 1111 132/83 36.6 °C (97.9 °F) Temporal (!) 102 16 95 % 1.676 m (5' 6\") 94.2 kg (207 lb 10.8 oz)   10/07/22 1031 (!) 155/72 -- -- 89 13 94 % -- --     General: No acute distress.   EYES: no jaundice  HEENT: OP clear   Neck: No bruits No JVD.   CVS:  RRR. S1 + S2. No M/R/G. No edema.  Resp: CTAB. No wheezing or crackles/rhonchi.  Abdomen: Soft, NT, ND,  Skin: Grossly nothing acute no obvious rashes  Neurological: Alert, Moves all extremities, no cranial nerve defects on limited exam  Extremities: Pulse 2+ in b/l LE. No cyanosis.     Data:  Laboratory studies personally reviewed by me:  Recent Results (from the past 24 hour(s))   EC-ECHOCARDIOGRAM COMPLETE W/ CONT    Collection Time: 10/07/22  1:37 PM   Result Value Ref Range    Left Ventrical Ejection Fraction 75    TROPONIN    Collection Time: 10/07/22  1:49 PM   Result Value Ref Range    Troponin T 37 (H) 6 - 19 ng/L   EKG    Collection Time: 10/08/22  2:36 AM   Result Value Ref Range    Report       [facility]    Test Date:  2022-10-08  Pt Name:    PATEL BAUTISTA       Department: 171  MRN:        [id number]                      Room:       [facility]  Gender:     Female                       Technician: SERA  :        1965                   Requested By:ANDRAE RODNEY  Order #:    [id number]                    Reading MD: Enrike La MD    Measurements  Intervals                                Axis  Rate:       118                          P:          67  TX:         140                          QRS:        19  QRSD:       83                           T:          66  QT:         322  QTc:        452    Interpretive Statements  Sinus tachycardia  Consider anterior infarct  Compared to ECG 10/06/2022 21:35:50  Myocardial infarct finding now present  Sinus rhythm no longer present  Electronically Signed On 10-8-2022 6:38:18 PDT by Enrike La MD   "   Basic Metabolic Panel    Collection Time: 10/08/22  3:09 AM   Result Value Ref Range    Sodium 136 135 - 145 mmol/L    Potassium 4.1 3.6 - 5.5 mmol/L    Chloride 105 96 - 112 mmol/L    Co2 18 (L) 20 - 33 mmol/L    Glucose 136 (H) 65 - 99 mg/dL    Bun 13 8 - 22 mg/dL    Creatinine 0.61 0.50 - 1.40 mg/dL    Calcium 9.7 8.5 - 10.5 mg/dL    Anion Gap 13.0 7.0 - 16.0   MAGNESIUM    Collection Time: 10/08/22  3:09 AM   Result Value Ref Range    Magnesium 1.8 1.5 - 2.5 mg/dL   CBC WITH DIFFERENTIAL    Collection Time: 10/08/22  3:09 AM   Result Value Ref Range    WBC 8.8 4.8 - 10.8 K/uL    RBC 4.90 4.20 - 5.40 M/uL    Hemoglobin 15.0 12.0 - 16.0 g/dL    Hematocrit 44.1 37.0 - 47.0 %    MCV 90.0 81.4 - 97.8 fL    MCH 30.6 27.0 - 33.0 pg    MCHC 34.0 33.6 - 35.0 g/dL    RDW 45.1 35.9 - 50.0 fL    Platelet Count 310 164 - 446 K/uL    MPV 10.3 9.0 - 12.9 fL    Neutrophils-Polys 71.50 44.00 - 72.00 %    Lymphocytes 18.60 (L) 22.00 - 41.00 %    Monocytes 8.40 0.00 - 13.40 %    Eosinophils 0.70 0.00 - 6.90 %    Basophils 0.50 0.00 - 1.80 %    Immature Granulocytes 0.30 0.00 - 0.90 %    Nucleated RBC 0.00 /100 WBC    Neutrophils (Absolute) 6.26 2.00 - 7.15 K/uL    Lymphs (Absolute) 1.63 1.00 - 4.80 K/uL    Monos (Absolute) 0.74 0.00 - 0.85 K/uL    Eos (Absolute) 0.06 0.00 - 0.51 K/uL    Baso (Absolute) 0.04 0.00 - 0.12 K/uL    Immature Granulocytes (abs) 0.03 0.00 - 0.11 K/uL    NRBC (Absolute) 0.00 K/uL   TROPONIN    Collection Time: 10/08/22  3:09 AM   Result Value Ref Range    Troponin T 104 (H) 6 - 19 ng/L   ESTIMATED GFR    Collection Time: 10/08/22  3:09 AM   Result Value Ref Range    GFR (CKD-EPI) 105 >60 mL/min/1.73 m 2   EKG    Collection Time: 10/08/22  3:55 AM   Result Value Ref Range    Report       [facility]    Test Date:  2022-10-08  Pt Name:    PATEL BAUTISTA       Department: 171  MRN:        [id number]                      Room:       [facility]  Gender:     Female                       Technician:  VIR  :        1965                   Requested By:ANDRAE RODNEY  Order #:    [id number]                    Reading MD: Enrike La MD    Measurements  Intervals                                Axis  Rate:       92                           P:          53  KS:         147                          QRS:        16  QRSD:       90                           T:          85  QT:         359  QTc:        445    Interpretive Statements  Sinus rhythm  Abnrm T, consider ischemia, anterolateral lds  Minimal ST elevation, inferior leads  Compared to ECG 10/08/2022 02:36:29  Possible ischemia now present  ST (T wave) deviation now present  Sinus tachycardia no longer present  Myocardial infarct finding no longer present  Electronically Signed On 10-8-2022 6:38:27 PDT by Jaleel La MD     TROPONIN    Collection Time: 10/08/22  5:15 AM   Result Value Ref Range    Troponin T 116 (H) 6 - 19 ng/L       EKG 10/8/22 interpreted by me sinus, TWI - compared to prior TWI are new    All pertinent features of laboratory and imaging reviewed including primary images where applicable      Principal Problem:    Hypertensive urgency POA: Yes  Active Problems:    Heart palpitations POA: Yes      Overview: IMO load 2020    Essential hypertension POA: Yes    Hypothyroid POA: Yes    Pain in the chest POA: Unknown    Hypokalemia POA: Unknown  Resolved Problems:    * No resolved hospital problems. *      Assessment / Plan:  56 year old woman with PMH HTN, palpitations, hypothyroid, high stress at home due to loss of brother and mother presents with chest pressure found nstemi and hypertensive emergency. Consult for uptrending troponin and EKG changes.    -aspirin and heparin gtt  -LHC. If unable to perform today can resume diet and keep npo after midnight.  -BP control please favor beta blockers and reduce diuretic use. Evidence of dynamic LVOT obstruction on echo and physical exam. Titrate up carvedilol and d/c hctz.  -high  intensity statin    I personally discussed her case with Dr Haider    It is my pleasure to participate in the care of Ms. Bah.  Please do not hesitate to contact me with questions or concerns.    Guanako Menchaca MD  Cardiologist [facility]

## 2022-10-08 NOTE — PROGRESS NOTES
NOC HOSPITALIST CROSS COVER    Notified by RN regarding hypertension of 208/106.  Per report, Nitropaste was placed on the patient's chest after she had an episode of chest pain accompanied by significant hypertension.  Unfortunately the patient's blood pressure continued to trend up, peaking at 210/104.  Hydralazine was discontinued as the patient developed reflexive tachycardia.  P.o. clonidine 0.1 mg was administered with a goal of reducing blood pressure, however approximately an hour and a half after administration, no improvements in blood pressure.  The nurse then notified me that the patient had an episode of chest pain.  I evaluated the patient at bedside and noted the patient to be in no acute distress, although anxious.  Stat twelve-lead EKG and troponins were obtained.  Twelve-lead EKG did demonstrate mild ST elevations in V2 and V3 with reciprocal ST depressions in the inferior leads.  Administered Labetalol 20 mg IV once.  This was given along with a dose of morphine 1 mg IV with improvement in chest pain. Collaborating MD, Dr Batista notified for possible need for IMCU upgrade if no improvement in SBP. Notified Dr. La, cardiology, given EKG changes from T wave inversions prior to now slight ST elevation in V2 and V3.  Per Dr. La, treat blood pressure with an additional labetalol 20 mg IV once now and that the patient should be treated for the significant hypertensive emergency prior to evaluating ST changes.  Per cardiology, repeat EKG once blood pressure has stabilized.  Repeat twelve-lead EKG demonstrated improvement in ST elevations and return to baseline T wave inversions.  She did have an elevated troponin of 104, up from her baseline of 30s earlier in this admission, though believe that this is secondary to hypertensive emergency.  We will recheck a troponin in 2 hours.  Patient is resting calmly in bed and is no longer complaining of chest pain.  Repeat blood pressure noted to be  132/58.      Vitals:    10/08/22 0341   BP: 132/58   Pulse: 87   Resp:    Temp:    SpO2:         -Repeated troponin of 116.  Consider cardiology consult.      -----------------------------------------------------------------------------------------------------------    Electronically signed by:  TERRENCE Joyner AGACN-BC  Hospitalist Services

## 2022-10-08 NOTE — PROGRESS NOTES
"Pt admitted for hypertensive urgency. When the author of this note took over this patient's care 1/7/22 @1900 SBP within the desired parameter <180. At 23:19 VS reassessed when pt ambulated to BR and back to bed developing worsening chest pressure. Nitro paste administered per PRN parameters and Rajeev Briscoe NP notified of the situation. BP not greatly improved with nitro paste however CP had improved from 7/10 to 1-2/10. Given pt's tachycardia decision was made to administer clonidine instead of hydralazine. BP still not improving with administration of clonidine and pt called out 90 minutes later c/o returning CP 5/10, feeling her \"heart pounding,\" and increasing anxiety/diaphoresis. BP still significantly elevated at 210/96 . NP Rife at bedside to assess pt again; morphine administered and EKG/troponin ordered. After conference with cardiology decision made to administer 20mg IVP labetalol which resolved HTN to 132/58 HR 80-90s. Pt reports resolution of CP at this time and resting comfortably. Will continue to monitor.  "

## 2022-10-08 NOTE — CARE PLAN
The patient is Watcher - Medium risk of patient condition declining or worsening    Shift Goals  Clinical Goals: echo  Patient Goals: rest, free of chest pain  Family Goals: neda    Progress made toward(s) clinical / shift goals:    Problem: Knowledge Deficit - Standard  Goal: Patient and family/care givers will demonstrate understanding of plan of care, disease process/condition, diagnostic tests and medications  Outcome: Progressing     Problem: Pain - Standard  Goal: Alleviation of pain or a reduction in pain to the patient’s comfort goal  Outcome: Progressing       Patient is not progressing towards the following goals:

## 2022-10-09 ENCOUNTER — APPOINTMENT (OUTPATIENT)
Dept: CARDIOLOGY | Facility: MEDICAL CENTER | Age: 57
DRG: 233 | End: 2022-10-09
Attending: INTERNAL MEDICINE
Payer: COMMERCIAL

## 2022-10-09 LAB
APTT PPP: 25.8 SEC (ref 24.7–36)
APTT PPP: 26.1 SEC (ref 24.7–36)
EKG IMPRESSION: NORMAL
INR PPP: 1 (ref 0.87–1.13)
INR PPP: 1.03 (ref 0.87–1.13)
PROTHROMBIN TIME: 13.1 SEC (ref 12–14.6)
PROTHROMBIN TIME: 13.4 SEC (ref 12–14.6)
UFH PPP CHRO-ACNC: <0.1 IU/ML
UFH PPP CHRO-ACNC: <0.1 IU/ML

## 2022-10-09 PROCEDURE — 700102 HCHG RX REV CODE 250 W/ 637 OVERRIDE(OP): Performed by: INTERNAL MEDICINE

## 2022-10-09 PROCEDURE — 700111 HCHG RX REV CODE 636 W/ 250 OVERRIDE (IP)

## 2022-10-09 PROCEDURE — A9270 NON-COVERED ITEM OR SERVICE: HCPCS | Performed by: STUDENT IN AN ORGANIZED HEALTH CARE EDUCATION/TRAINING PROGRAM

## 2022-10-09 PROCEDURE — 700102 HCHG RX REV CODE 250 W/ 637 OVERRIDE(OP): Performed by: STUDENT IN AN ORGANIZED HEALTH CARE EDUCATION/TRAINING PROGRAM

## 2022-10-09 PROCEDURE — 85610 PROTHROMBIN TIME: CPT

## 2022-10-09 PROCEDURE — 85520 HEPARIN ASSAY: CPT

## 2022-10-09 PROCEDURE — 99233 SBSQ HOSP IP/OBS HIGH 50: CPT | Performed by: STUDENT IN AN ORGANIZED HEALTH CARE EDUCATION/TRAINING PROGRAM

## 2022-10-09 PROCEDURE — 700102 HCHG RX REV CODE 250 W/ 637 OVERRIDE(OP): Performed by: GENERAL PRACTICE

## 2022-10-09 PROCEDURE — 93458 L HRT ARTERY/VENTRICLE ANGIO: CPT | Mod: 26 | Performed by: INTERNAL MEDICINE

## 2022-10-09 PROCEDURE — B2111ZZ FLUOROSCOPY OF MULTIPLE CORONARY ARTERIES USING LOW OSMOLAR CONTRAST: ICD-10-PCS | Performed by: INTERNAL MEDICINE

## 2022-10-09 PROCEDURE — 99152 MOD SED SAME PHYS/QHP 5/>YRS: CPT | Performed by: INTERNAL MEDICINE

## 2022-10-09 PROCEDURE — 99153 MOD SED SAME PHYS/QHP EA: CPT

## 2022-10-09 PROCEDURE — 700101 HCHG RX REV CODE 250

## 2022-10-09 PROCEDURE — 99232 SBSQ HOSP IP/OBS MODERATE 35: CPT | Performed by: INTERNAL MEDICINE

## 2022-10-09 PROCEDURE — A9270 NON-COVERED ITEM OR SERVICE: HCPCS | Performed by: INTERNAL MEDICINE

## 2022-10-09 PROCEDURE — 770020 HCHG ROOM/CARE - TELE (206)

## 2022-10-09 PROCEDURE — 85730 THROMBOPLASTIN TIME PARTIAL: CPT

## 2022-10-09 PROCEDURE — 700111 HCHG RX REV CODE 636 W/ 250 OVERRIDE (IP): Performed by: STUDENT IN AN ORGANIZED HEALTH CARE EDUCATION/TRAINING PROGRAM

## 2022-10-09 PROCEDURE — 93005 ELECTROCARDIOGRAM TRACING: CPT | Performed by: INTERNAL MEDICINE

## 2022-10-09 PROCEDURE — 700111 HCHG RX REV CODE 636 W/ 250 OVERRIDE (IP): Performed by: GENERAL PRACTICE

## 2022-10-09 PROCEDURE — 99223 1ST HOSP IP/OBS HIGH 75: CPT | Performed by: THORACIC SURGERY (CARDIOTHORACIC VASCULAR SURGERY)

## 2022-10-09 PROCEDURE — 93010 ELECTROCARDIOGRAM REPORT: CPT | Performed by: INTERNAL MEDICINE

## 2022-10-09 PROCEDURE — 36415 COLL VENOUS BLD VENIPUNCTURE: CPT

## 2022-10-09 PROCEDURE — A9270 NON-COVERED ITEM OR SERVICE: HCPCS | Performed by: GENERAL PRACTICE

## 2022-10-09 PROCEDURE — 4A023N7 MEASUREMENT OF CARDIAC SAMPLING AND PRESSURE, LEFT HEART, PERCUTANEOUS APPROACH: ICD-10-PCS | Performed by: INTERNAL MEDICINE

## 2022-10-09 RX ORDER — HEPARIN SODIUM 1000 [USP'U]/ML
INJECTION, SOLUTION INTRAVENOUS; SUBCUTANEOUS
Status: COMPLETED
Start: 2022-10-09 | End: 2022-10-09

## 2022-10-09 RX ORDER — HEPARIN SODIUM 1000 [USP'U]/ML
4000 INJECTION, SOLUTION INTRAVENOUS; SUBCUTANEOUS ONCE
Status: COMPLETED | OUTPATIENT
Start: 2022-10-09 | End: 2022-10-09

## 2022-10-09 RX ORDER — ASPIRIN 81 MG/1
TABLET, CHEWABLE ORAL
Status: COMPLETED
Start: 2022-10-09 | End: 2022-10-09

## 2022-10-09 RX ORDER — CARVEDILOL 25 MG/1
25 TABLET ORAL 2 TIMES DAILY WITH MEALS
Status: DISCONTINUED | OUTPATIENT
Start: 2022-10-09 | End: 2022-10-12

## 2022-10-09 RX ORDER — HEPARIN SODIUM 5000 [USP'U]/100ML
0-30 INJECTION, SOLUTION INTRAVENOUS CONTINUOUS
Status: DISCONTINUED | OUTPATIENT
Start: 2022-10-09 | End: 2022-10-12

## 2022-10-09 RX ORDER — ONDANSETRON 2 MG/ML
4 INJECTION INTRAMUSCULAR; INTRAVENOUS EVERY 4 HOURS PRN
Status: DISCONTINUED | OUTPATIENT
Start: 2022-10-09 | End: 2022-10-12

## 2022-10-09 RX ORDER — HEPARIN SODIUM 200 [USP'U]/100ML
INJECTION, SOLUTION INTRAVENOUS
Status: COMPLETED
Start: 2022-10-09 | End: 2022-10-09

## 2022-10-09 RX ORDER — HEPARIN SODIUM 1000 [USP'U]/ML
40 INJECTION, SOLUTION INTRAVENOUS; SUBCUTANEOUS PRN
Status: DISCONTINUED | OUTPATIENT
Start: 2022-10-09 | End: 2022-10-09

## 2022-10-09 RX ORDER — MIDAZOLAM HYDROCHLORIDE 1 MG/ML
INJECTION INTRAMUSCULAR; INTRAVENOUS
Status: COMPLETED
Start: 2022-10-09 | End: 2022-10-09

## 2022-10-09 RX ORDER — HEPARIN SODIUM 1000 [USP'U]/ML
80 INJECTION, SOLUTION INTRAVENOUS; SUBCUTANEOUS ONCE
Status: DISCONTINUED | OUTPATIENT
Start: 2022-10-09 | End: 2022-10-09

## 2022-10-09 RX ORDER — ONDANSETRON 4 MG/1
4 TABLET, ORALLY DISINTEGRATING ORAL EVERY 4 HOURS PRN
Status: DISCONTINUED | OUTPATIENT
Start: 2022-10-09 | End: 2022-10-12

## 2022-10-09 RX ORDER — HEPARIN SODIUM 5000 [USP'U]/100ML
0-30 INJECTION, SOLUTION INTRAVENOUS CONTINUOUS
Status: DISCONTINUED | OUTPATIENT
Start: 2022-10-09 | End: 2022-10-09

## 2022-10-09 RX ORDER — ROSUVASTATIN CALCIUM 10 MG/1
20 TABLET, COATED ORAL EVERY EVENING
Status: DISCONTINUED | OUTPATIENT
Start: 2022-10-09 | End: 2022-10-12

## 2022-10-09 RX ORDER — LIDOCAINE HYDROCHLORIDE 20 MG/ML
INJECTION, SOLUTION INFILTRATION; PERINEURAL
Status: COMPLETED
Start: 2022-10-09 | End: 2022-10-09

## 2022-10-09 RX ORDER — VERAPAMIL HYDROCHLORIDE 2.5 MG/ML
INJECTION, SOLUTION INTRAVENOUS
Status: COMPLETED
Start: 2022-10-09 | End: 2022-10-09

## 2022-10-09 RX ORDER — HEPARIN SODIUM 1000 [USP'U]/ML
2000 INJECTION, SOLUTION INTRAVENOUS; SUBCUTANEOUS PRN
Status: DISCONTINUED | OUTPATIENT
Start: 2022-10-09 | End: 2022-10-12

## 2022-10-09 RX ADMIN — ROSUVASTATIN CALCIUM 20 MG: 20 TABLET, FILM COATED ORAL at 17:09

## 2022-10-09 RX ADMIN — LEVOTHYROXINE SODIUM 100 MCG: 0.1 TABLET ORAL at 04:06

## 2022-10-09 RX ADMIN — ONDANSETRON 4 MG: 4 TABLET, ORALLY DISINTEGRATING ORAL at 22:04

## 2022-10-09 RX ADMIN — VERAPAMIL HYDROCHLORIDE 5 MG: 2.5 INJECTION, SOLUTION INTRAVENOUS at 10:28

## 2022-10-09 RX ADMIN — ASPIRIN 81 MG 81 MG: 81 TABLET ORAL at 04:06

## 2022-10-09 RX ADMIN — CARVEDILOL 12.5 MG: 12.5 TABLET, FILM COATED ORAL at 08:12

## 2022-10-09 RX ADMIN — FENTANYL CITRATE 75 MCG: 50 INJECTION, SOLUTION INTRAMUSCULAR; INTRAVENOUS at 11:28

## 2022-10-09 RX ADMIN — HEPARIN SODIUM 2000 UNITS: 200 INJECTION, SOLUTION INTRAVENOUS at 10:28

## 2022-10-09 RX ADMIN — NITROGLYCERIN 10 ML: 20 INJECTION INTRAVENOUS at 10:28

## 2022-10-09 RX ADMIN — CARVEDILOL 25 MG: 25 TABLET, FILM COATED ORAL at 17:10

## 2022-10-09 RX ADMIN — HEPARIN SODIUM 4000 UNITS: 1000 INJECTION, SOLUTION INTRAVENOUS; SUBCUTANEOUS at 22:05

## 2022-10-09 RX ADMIN — MORPHINE SULFATE 1 MG: 4 INJECTION, SOLUTION INTRAMUSCULAR; INTRAVENOUS at 17:10

## 2022-10-09 RX ADMIN — LABETALOL HYDROCHLORIDE 20 MG: 5 INJECTION INTRAVENOUS at 02:11

## 2022-10-09 RX ADMIN — AMLODIPINE BESYLATE 10 MG: 10 TABLET ORAL at 04:06

## 2022-10-09 RX ADMIN — LIDOCAINE HYDROCHLORIDE: 20 INJECTION, SOLUTION INFILTRATION; PERINEURAL at 10:28

## 2022-10-09 RX ADMIN — MORPHINE SULFATE 1 MG: 4 INJECTION, SOLUTION INTRAMUSCULAR; INTRAVENOUS at 12:15

## 2022-10-09 RX ADMIN — LOSARTAN POTASSIUM 100 MG: 50 TABLET, FILM COATED ORAL at 04:06

## 2022-10-09 RX ADMIN — HEPARIN SODIUM 12 UNITS/KG/HR: 5000 INJECTION, SOLUTION INTRAVENOUS at 22:06

## 2022-10-09 RX ADMIN — MORPHINE SULFATE 1 MG: 4 INJECTION, SOLUTION INTRAMUSCULAR; INTRAVENOUS at 08:18

## 2022-10-09 RX ADMIN — MORPHINE SULFATE 1 MG: 4 INJECTION, SOLUTION INTRAMUSCULAR; INTRAVENOUS at 02:15

## 2022-10-09 RX ADMIN — MIDAZOLAM HYDROCHLORIDE 1.5 MG: 1 INJECTION, SOLUTION INTRAMUSCULAR; INTRAVENOUS at 11:28

## 2022-10-09 RX ADMIN — HEPARIN SODIUM 5000 UNITS: 5000 INJECTION, SOLUTION INTRAVENOUS; SUBCUTANEOUS at 04:07

## 2022-10-09 RX ADMIN — HEPARIN SODIUM: 1000 INJECTION, SOLUTION INTRAVENOUS; SUBCUTANEOUS at 10:29

## 2022-10-09 ASSESSMENT — ENCOUNTER SYMPTOMS
RESPIRATORY NEGATIVE: 1
CHILLS: 0
PSYCHIATRIC NEGATIVE: 1
NEUROLOGICAL NEGATIVE: 1
WEIGHT LOSS: 0
SHORTNESS OF BREATH: 0
EYES NEGATIVE: 1

## 2022-10-09 ASSESSMENT — PAIN DESCRIPTION - PAIN TYPE
TYPE: ACUTE PAIN
TYPE: ACUTE PAIN

## 2022-10-09 ASSESSMENT — FIBROSIS 4 INDEX
FIB4 SCORE: 0.58
FIB4 SCORE: 0.58

## 2022-10-09 NOTE — PROGRESS NOTES
Brief update note    Coronary angiogram today showed extensive LAD disease and also distal RCA bifurcation disease.  Per interventional cardiology recommendation, will consult CT surgery for CABG.  -Continue heparin gtt.  -Continue all other meds as outlined in my note dated today.    Sunshine Terrell M.D.

## 2022-10-09 NOTE — PROGRESS NOTES
Post Labatalol and Morphine, vitals= /115, HR93, O2 92%, CP was 7/10, now 3/10    0226 vitals were: /96, HR 91, O2 93% on RA, rates CP 0/10.    Will continue to  monitor BP, Q15 mins to ensure BP is not high.

## 2022-10-09 NOTE — CARE PLAN
The patient is Stable - Low risk of patient condition declining or worsening    Shift Goals  Clinical Goals: monitor BP  Patient Goals: rest, comfort  Family Goals: None at bedside    Progress made toward(s) clinical / shift goals:  yes    Patient is not progressing towards the following goals:      Problem: Knowledge Deficit - Standard  Goal: Patient and family/care givers will demonstrate understanding of plan of care, disease process/condition, diagnostic tests and medications  Outcome: Progressing     Problem: Pain - Standard  Goal: Alleviation of pain or a reduction in pain to the patient’s comfort goal  Outcome: Progressing

## 2022-10-09 NOTE — H&P (VIEW-ONLY)
REFERRING PHYSICIAN: Sunshine Terrell MD.     CONSULTING PHYSICIAN: Coretta Smyth MD.    CHIEF COMPLAINT: Chest pain    HISTORY OF PRESENT ILLNESS: The patient is a 56 y.o. female with a past medical history of hypertension, SVT, palpitations, hypothyroidism who presented to the ER on 10/6/22 with off and on substernal sharp chest pain over the last week. It is partially relieved with rest.  She has recently been stressed due to family tragedy.  Her repeat troponins in the ER were elevated with some ischemic changes on her ECG bu no ST elevation.  She was seen by cardiology and taken to the cath lab where she was found to have multivessel disease. She was also diagnosed with HTNsive emergency and has been treated for that.  She is ambulatory and able to do ADL at baseline. She lives with her brother, two adult sons and their children (her grandkids).    PAST MEDICAL HISTORY:   Past Medical History:   Diagnosis Date    Anesthesia     slow to wake up    Heart palpitations     Hypertension     Hypothyroid     Vitamin D deficiency        PAST SURGICAL HISTORY:   Past Surgical History:   Procedure Laterality Date    RECTOCELE REPAIR  3/15/2011    Performed by MARK RAMÍREZ at SURGERY SAME DAY [facility]    BIOPSY GENERAL  10/20/08    Performed by MARK RAMÍREZ at SURGERY SAME DAY [facility]    HYSTEROSCOPY WITH VIDEO OPERATIVE  10/20/08    Performed by MARK RAMÍREZ at SURGERY SAME DAY [facility]    GYN SURGERY          BRIANNE BY LAPAROSCOPY         FAMILY HISTORY:   Family History   Problem Relation Age of Onset    Arthritis Mother     Hypertension Mother     Heart Disease Father 67        heart attack    Diabetes Father     Hypertension Sister         SOCIAL HISTORY:   Social History     Socioeconomic History    Marital status:      Spouse name: Not on file    Number of children: Not on file    Years of education: Not on file    Highest education level: Not on file    Occupational History    Not on file   Tobacco Use    Smoking status: Never    Smokeless tobacco: Never   Vaping Use    Vaping Use: Never used   Substance and Sexual Activity    Alcohol use: Yes     Comment: occ    Drug use: No    Sexual activity: Yes     Partners: Male     Comment: vasectomy   Other Topics Concern    Not on file   Social History Narrative    Not on file     Social Determinants of Health     Financial Resource Strain: Not on file   Food Insecurity: Not on file   Transportation Needs: Not on file   Physical Activity: Not on file   Stress: Not on file   Social Connections: Not on file   Intimate Partner Violence: Not on file   Housing Stability: Not on file       ALLERGIES:   Allergies   Allergen Reactions    Ace Inhibitors Cough    Atorvastatin Unspecified     Statin not indicted with coronary calcium score of 0.        CURRENT MEDICATIONS:     Current Facility-Administered Medications:     carvedilol (COREG) tablet 25 mg, 25 mg, Oral, BID WITH MEALS, Landon Haider M.D.    rosuvastatin (CRESTOR) tablet 20 mg, 20 mg, Oral, Q EVENING, Sunshine Terrell M.D.    heparin infusion 25,000 units in 500 mL 0.45% NACL, 0-30 Units/kg/hr (Adjusted), Intravenous, Continuous, Sunshine Terrell M.D., Held at 10/09/22 0915    heparin injection 4,000 Units, 4,000 Units, Intravenous, Once, Sunshine Terrell M.D.    heparin injection 2,000 Units, 2,000 Units, Intravenous, PRN, Sunshine Terrell M.D.    iohexol (OMNIPAQUE) 350 mg/mL, 1-300 mL, Intra-arterial, Once, Westley Sandy M.D.    morphine 4 MG/ML injection 1 mg, 1 mg, Intravenous, Q3HRS PRN, Roya Briscoe A.P.R.N., 1 mg at 10/09/22 0818    labetalol (NORMODYNE/TRANDATE) injection 20 mg, 20 mg, Intravenous, Q4HRS PRN, Roya Briscoe A.P.R.N., 20 mg at 10/09/22 0211    acetaminophen (Tylenol) tablet 650 mg, 650 mg, Oral, Q4HRS PRN, Landon Haider M.D., 650 mg at 10/08/22 0745    amLODIPine (NORVASC) tablet 10 mg, 10 mg, Oral, Q DAY, Landon Haider M.D., 10 mg at 10/09/22 0039     losartan (COZAAR) tablet 100 mg, 100 mg, Oral, Q DAY, 100 mg at 10/09/22 0406 **AND** [DISCONTINUED] hydroCHLOROthiazide (HYDRODIURIL) tablet 25 mg, 25 mg, Oral, Q DAY, Addy Pineda M.D., 25 mg at 10/08/22 0525    aspirin (ASA) chewable tab 81 mg, 81 mg, Oral, DAILY, Mary Villafana D.O., 81 mg at 10/09/22 0406    levothyroxine (SYNTHROID) tablet 100 mcg, 100 mcg, Oral, AM ES, Addy Pineda M.D., 100 mcg at 10/09/22 0406     LABS REVIEWED:  Lab Results   Component Value Date/Time    SODIUM 136 10/08/2022 03:09 AM    POTASSIUM 4.1 10/08/2022 03:09 AM    CHLORIDE 105 10/08/2022 03:09 AM    CO2 18 (L) 10/08/2022 03:09 AM    GLUCOSE 136 (H) 10/08/2022 03:09 AM    BUN 13 10/08/2022 03:09 AM    CREATININE 0.61 10/08/2022 03:09 AM    CREATININE 0.72 01/21/2011 10:15 AM    BUNCREATRAT 19 07/06/2018 07:52 AM    BUNCREATRAT 15 01/21/2011 10:15 AM    GLOMRATE >59 01/21/2011 10:15 AM      Lab Results   Component Value Date/Time    PROTHROMBTM 13.1 10/09/2022 09:39 AM    INR 1.00 10/09/2022 09:39 AM      Lab Results   Component Value Date/Time    WBC 8.8 10/08/2022 03:09 AM    WBC 6.7 01/21/2011 10:15 AM    RBC 4.90 10/08/2022 03:09 AM    RBC 5.11 (H) 01/21/2011 10:15 AM    HEMOGLOBIN 15.0 10/08/2022 03:09 AM    HEMATOCRIT 44.1 10/08/2022 03:09 AM    MCV 90.0 10/08/2022 03:09 AM    MCV 90 01/21/2011 10:15 AM    MCH 30.6 10/08/2022 03:09 AM    MCH 30.1 01/21/2011 10:15 AM    MCHC 34.0 10/08/2022 03:09 AM    MPV 10.3 10/08/2022 03:09 AM    NEUTSPOLYS 71.50 10/08/2022 03:09 AM    LYMPHOCYTES 18.60 (L) 10/08/2022 03:09 AM    MONOCYTES 8.40 10/08/2022 03:09 AM    EOSINOPHILS 0.70 10/08/2022 03:09 AM    BASOPHILS 0.50 10/08/2022 03:09 AM        IMAGING REVIEWED AND INTERPRETED:    ECHOCARDIOGRAM: 10/7/22 [facility] [place]  Hyperdynamic left ventricular systolic function.  The left ventricular ejection fraction is visually estimated to be   greater than 75%.  Distal septal hypokinesis.  Mild concentric left ventricular hypertrophy.  LVOT  outflow tract obstruction noted 36 mmHg.  Mild mitral regurgitation.  Normal right ventricular size and systolic function.  Normal right ventricular size and systolic function.       ANGIOGRAM: 10/9/22 [facility] [place]  HEMODYNAMICS:   Aortic pressure: 82/40 mmHg (waveforms appear dampened and may be inaccurate)  Pre A-wave pressure: 8 mmHg (waveforms appear dampened and may be inaccurate)  No significant aortic gradient on pullback     CORONARY ANGIOGRAPHY:  The left main coronary artery is a short tapering vessel with distal 20% disease that bifurcates into the left anterior descending and left circumflex coronary arteries.  The left anterior descending coronary artery is a heavily diseased vessel with ostial to proximal 50% disease, a long segment of mid 80% disease after the takeoff of the first diagonal branch and involving the ostium of the second diagonal branch, a relatively healthy mid-distal segment, and then tapering distal luminal irregularities.  It supplies two small diffusely diseased proximal diagonal branches and a small-moderate third diagonal branch.  The left circumflex coronary artery is a large, nondominant vessel that supplies a large first obtuse marginal branch and a small second obtuse marginal branch and has luminal irregularities in the distribution.  The right coronary artery is a large, dominant vessel with ostial 50% disease, mid 30% disease, and a hazy distal bifurcation with ostial 60-70% disease in both a large posterior descending coronary and a large posterolateral branch.     IMPRESSION:  Extensively diseased left anterior descending coronary from the ostium to mid segment of the vessel with a relatively healthy mid-distal segment.  Angiographically moderate ostial and moderate-severe distal bifurcation right coronary artery disease.  Minimal luminal irregularities in the left circumflex coronary artery distribution.     RECOMMENDATIONS:  Return to floor with continued care per primary  service.  TR band release per protocol.  Further evaluation of the patient's diabetes status as her glycosylated hemoglobin A1c was in the prediabetic range, however, consecutive fasting glucose measurements were in the diabetic range, although the patient is currently in a high stress situation.  Strong consideration for coronary artery bypass grafting if diabetes is confirmed given extensive left anterior descending coronary disease and distal bifurcation right coronary artery disease.  Continue optimal medical management of hypertension and coronary artery disease.    REVIEW OF SYSTEMS:   Review of Systems   Constitutional:  Positive for malaise/fatigue. Negative for chills and weight loss.   HENT: Negative.     Eyes: Negative.    Respiratory: Negative.  Negative for shortness of breath.    Cardiovascular:  Positive for chest pain (off and on prior to admit now resolved).   Gastrointestinal:  Positive for heartburn. Negative for abdominal pain.   Genitourinary: Negative.    Skin: Negative.    Neurological: Negative.    Endo/Heme/Allergies: Negative.    Psychiatric/Behavioral: Negative.         PHYSICAL EXAMINATION:   Physical Exam  Vitals reviewed.   Constitutional:       General: She is not in acute distress.     Appearance: Normal appearance.   HENT:      Head: Normocephalic and atraumatic.      Right Ear: External ear normal.      Left Ear: External ear normal.      Nose: Nose normal. No congestion.   Eyes:      General: No scleral icterus.     Extraocular Movements: Extraocular movements intact.      Conjunctiva/sclera: Conjunctivae normal.   Cardiovascular:      Rate and Rhythm: Normal rate and regular rhythm.   Pulmonary:      Effort: Pulmonary effort is normal. No respiratory distress.   Abdominal:      General: Abdomen is flat. There is no distension.   Musculoskeletal:         General: Normal range of motion.      Cervical back: Normal range of motion.   Skin:     General: Skin is warm and dry.  "  Neurological:      Mental Status: She is alert and oriented to person, place, and time. Mental status is at baseline.      Cranial Nerves: No cranial nerve deficit.   Psychiatric:         Mood and Affect: Mood normal.         Judgment: Judgment normal.     /83   Pulse 95   Temp 37.1 °C (98.8 °F) (Temporal)   Resp 16   Ht 1.676 m (5' 6\")   Wt 96.5 kg (212 lb 11.9 oz)   LMP 06/01/2020 (Approximate)   SpO2 94%   BMI 34.34 kg/m²        IMPRESSION:  55 yo woman with known medical conditions of hypertension, SVT, palpitations, hypothyroidism presenting with NSTEMI and multivessel CAD.      PLAN:  I recommend CABG x 2 this admission    The procedure, its risks, benefits, potential complications and alternative treatments were discussed with the patient in detail including the risks should she decide not to undergo my recommended treatment. All of her questions were answered to her satisfaction and she is willing to proceed with the operation. The risks include death, stroke,  infection: to include a rare bacterial infection related to the use of the heart/lung machine, jory-operative myocardial infarction, dysrhythmias, diaphragmatic paralysis, chest wall paresthesia, tracheostomy, kidney or other organ failure, possible return to the operating room for bleeding, bleeding requiring transfusion with its attendant risks including AIDS or hepatitis, dehiscence of surgical incisions, respiratory complications including the need for prolonged ventilator support, Protamine or other drug reaction, peripheral neuropathy, loss of limb, and miscount of surgical items. The operative mortality risk is approximately 1%. The STS mortality risk score is 1% and the morbidity and mortality risk score is 4.1%. The scores were discussed with patient.    The operation,CABG x2 is scheduled for Wednesday 10/12 at 7:30 AM at [facility].     Thank you for this very challenging consultation and participation in " the patient’s care.  I will keep you apprised of all future developments.          Sincerely,       Coretta Smyth MD.

## 2022-10-09 NOTE — PROCEDURES
Cardiac Catheterization Laboratory Procedure Note    DATE: 10/9/2022    : Westley Sandy MD    PROCEDURES PERFORMED:  Left heart catheterization  Coronary angiography  Moderate conscious sedation    INDICATIONS:  The patient is a 56-year-old woman referred for cardiac catheterization to evaluate chest pain in setting of severely uncontrolled blood pressure with an elevated troponin.    CONSENT:  The complete alternatives, risks, and benefits of the procedure were explained to the patient.  Signed informed consent was obtained and placed in the chart prior to the procedure.  A timeout was performed prior to beginning the procedure.    MEDICATIONS:  Lidocaine  Fentanyl  Midazolam  Nitroglycerin  Verapamil  Heparin    MODERATE CONSCIOUS SEDATION:  I personally supervised the administration of moderate conscious sedation by the nursing staff for 34 minutes.  Sedation start time: 10:56 AM  Sedation end time: 11:30 AM    CONTRAST: Omnipaque 84 cc    ACCESS: 6-Ukrainian Glidesheath in the right radial artery.    ESTIMATED BLOOD LOSS: 20 cc    COMPLICATIONS: None    DESCRIPTION OF PROCEDURE:  The patient was brought to the cardiac catheterization laboratory in the fasting state.  The skin over the right wrist was prepped and draped in the usual sterile fashion.  Lidocaine infiltration was used to anesthetize the tissue over the right radial artery.  Using the micropuncture technique, a 6-Ukrainian Glidesheath was inserted in the right radial artery.  A 5-Ukrainian Baljit catheter was then advanced over a standard J-wire into the aortic root, but could not adequately engage either coronary ostia and it was exchanged for a 6-Ukrainian JL-3.5 diagnostic catheter.  This catheter was advanced into the left ventricular cavity where it was gently aspirated, flushed, and withdrawn across the aortic valve with sequential pressures measured.  This catheter was unable to engage the ostium of the left main coronary artery and it was  exchanged for a 5-Yemeni Tiger catheter, which was able to engage the ostium of the right coronary artery and cineangiograms were obtained in multiple projections for complete evaluation of the right coronary system.  This catheter was unable to engage the ostium of the left main coronary artery and it was exchanged for a 6-Yemeni EBU-3.0 guide catheter, which was also unsuccessful.  Successful engagement of the left main coronary artery was achieved with a 6-Yemeni EBU-3.5 guide catheter with significant difficulty.  This catheter was then used to perform cineangiograms in multiple projections for complete evaluation of the left coronary system.  Due to extensive narrowing in the left anterior descending coronary artery system, intracoronary nitroglycerin was administered to evaluate for spasm with no significant improvement in luminal diameter.  Following completion of coronary angiography, all wires, catheters, and sheaths were removed.  A TR band was placed using the patent hemostasis technique.    HEMODYNAMICS:   Aortic pressure: 82/40 mmHg (waveforms appear dampened and may be inaccurate)  Pre A-wave pressure: 8 mmHg (waveforms appear dampened and may be inaccurate)  No significant aortic gradient on pullback    CORONARY ANGIOGRAPHY:  The left main coronary artery is a short tapering vessel with distal 20% disease that bifurcates into the left anterior descending and left circumflex coronary arteries.  The left anterior descending coronary artery is a heavily diseased vessel with ostial to proximal 50% disease, a long segment of mid 80% disease after the takeoff of the first diagonal branch and involving the ostium of the second diagonal branch, a relatively healthy mid-distal segment, and then tapering distal luminal irregularities.  It supplies two small diffusely diseased proximal diagonal branches and a small-moderate third diagonal branch.  The left circumflex coronary artery is a large, nondominant  vessel that supplies a large first obtuse marginal branch and a small second obtuse marginal branch and has luminal irregularities in the distribution.  The right coronary artery is a large, dominant vessel with ostial 50% disease, mid 30% disease, and a hazy distal bifurcation with ostial 60-70% disease in both a large posterior descending coronary and a large posterolateral branch.    IMPRESSION:  Extensively diseased left anterior descending coronary from the ostium to mid segment of the vessel with a relatively healthy mid-distal segment.  Angiographically moderate ostial and moderate-severe distal bifurcation right coronary artery disease.  Minimal luminal irregularities in the left circumflex coronary artery distribution.    RECOMMENDATIONS:  Return to floor with continued care per primary service.  TR band release per protocol.  Further evaluation of the patient's diabetes status as her glycosylated hemoglobin A1c was in the prediabetic range, however, consecutive fasting glucose measurements were in the diabetic range, although the patient is currently in a high stress situation.  Strong consideration for coronary artery bypass grafting if diabetes is confirmed given extensive left anterior descending coronary disease and distal bifurcation right coronary artery disease.  Continue optimal medical management of hypertension and coronary artery disease.    NOTIFICATION:  The patient's family was notified of the results of her cardiac catheterization.

## 2022-10-09 NOTE — CONSULTS
REFERRING PHYSICIAN: Sunshine Terrell MD.     CONSULTING PHYSICIAN: Coretta Smyth MD.    CHIEF COMPLAINT: Chest pain    HISTORY OF PRESENT ILLNESS: The patient is a 56 y.o. female with a past medical history of hypertension, SVT, palpitations, hypothyroidism who presented to the ER on 10/6/22 with off and on substernal sharp chest pain over the last week. It is partially relieved with rest.  She has recently been stressed due to family tragedy.  Her repeat troponins in the ER were elevated with some ischemic changes on her ECG bu no ST elevation.  She was seen by cardiology and taken to the cath lab where she was found to have multivessel disease. She was also diagnosed with HTNsive emergency and has been treated for that.  She is ambulatory and able to do ADL at baseline. She lives with her brother, two adult sons and their children (her grandkids).    PAST MEDICAL HISTORY:   Past Medical History:   Diagnosis Date    Anesthesia     slow to wake up    Heart palpitations     Hypertension     Hypothyroid     Vitamin D deficiency        PAST SURGICAL HISTORY:   Past Surgical History:   Procedure Laterality Date    RECTOCELE REPAIR  3/15/2011    Performed by MARK RAMÍREZ at SURGERY SAME DAY [facility] ORS    BIOPSY GENERAL  10/20/08    Performed by MARK RAMÍREZ at SURGERY SAME DAY [facility] ORS    HYSTEROSCOPY WITH VIDEO OPERATIVE  10/20/08    Performed by MARK RAMÍREZ at SURGERY SAME DAY [facility]    GYN SURGERY          BRIANNE BY LAPAROSCOPY         FAMILY HISTORY:   Family History   Problem Relation Age of Onset    Arthritis Mother     Hypertension Mother     Heart Disease Father 67        heart attack    Diabetes Father     Hypertension Sister         SOCIAL HISTORY:   Social History     Socioeconomic History    Marital status:      Spouse name: Not on file    Number of children: Not on file    Years of education: Not on file    Highest education level: Not on file    Occupational History    Not on file   Tobacco Use    Smoking status: Never    Smokeless tobacco: Never   Vaping Use    Vaping Use: Never used   Substance and Sexual Activity    Alcohol use: Yes     Comment: occ    Drug use: No    Sexual activity: Yes     Partners: Male     Comment: vasectomy   Other Topics Concern    Not on file   Social History Narrative    Not on file     Social Determinants of Health     Financial Resource Strain: Not on file   Food Insecurity: Not on file   Transportation Needs: Not on file   Physical Activity: Not on file   Stress: Not on file   Social Connections: Not on file   Intimate Partner Violence: Not on file   Housing Stability: Not on file       ALLERGIES:   Allergies   Allergen Reactions    Ace Inhibitors Cough    Atorvastatin Unspecified     Statin not indicted with coronary calcium score of 0.        CURRENT MEDICATIONS:     Current Facility-Administered Medications:     carvedilol (COREG) tablet 25 mg, 25 mg, Oral, BID WITH MEALS, Landon Haider M.D.    rosuvastatin (CRESTOR) tablet 20 mg, 20 mg, Oral, Q EVENING, Sunshine Terrell M.D.    heparin infusion 25,000 units in 500 mL 0.45% NACL, 0-30 Units/kg/hr (Adjusted), Intravenous, Continuous, Sunshine Terrell M.D., Held at 10/09/22 0915    heparin injection 4,000 Units, 4,000 Units, Intravenous, Once, Sunshine Terrell M.D.    heparin injection 2,000 Units, 2,000 Units, Intravenous, PRN, Sunshine Terrell M.D.    iohexol (OMNIPAQUE) 350 mg/mL, 1-300 mL, Intra-arterial, Once, Westley Sandy M.D.    morphine 4 MG/ML injection 1 mg, 1 mg, Intravenous, Q3HRS PRN, Roya Briscoe A.P.R.N., 1 mg at 10/09/22 0818    labetalol (NORMODYNE/TRANDATE) injection 20 mg, 20 mg, Intravenous, Q4HRS PRN, Roya Briscoe A.P.R.N., 20 mg at 10/09/22 0211    acetaminophen (Tylenol) tablet 650 mg, 650 mg, Oral, Q4HRS PRN, Landon Haider M.D., 650 mg at 10/08/22 0745    amLODIPine (NORVASC) tablet 10 mg, 10 mg, Oral, Q DAY, Landon Haider M.D., 10 mg at 10/09/22 3958     losartan (COZAAR) tablet 100 mg, 100 mg, Oral, Q DAY, 100 mg at 10/09/22 0406 **AND** [DISCONTINUED] hydroCHLOROthiazide (HYDRODIURIL) tablet 25 mg, 25 mg, Oral, Q DAY, Addy Pineda M.D., 25 mg at 10/08/22 0525    aspirin (ASA) chewable tab 81 mg, 81 mg, Oral, DAILY, Mary Villafana D.O., 81 mg at 10/09/22 0406    levothyroxine (SYNTHROID) tablet 100 mcg, 100 mcg, Oral, AM ES, Addy Pineda M.D., 100 mcg at 10/09/22 0406     LABS REVIEWED:  Lab Results   Component Value Date/Time    SODIUM 136 10/08/2022 03:09 AM    POTASSIUM 4.1 10/08/2022 03:09 AM    CHLORIDE 105 10/08/2022 03:09 AM    CO2 18 (L) 10/08/2022 03:09 AM    GLUCOSE 136 (H) 10/08/2022 03:09 AM    BUN 13 10/08/2022 03:09 AM    CREATININE 0.61 10/08/2022 03:09 AM    CREATININE 0.72 01/21/2011 10:15 AM    BUNCREATRAT 19 07/06/2018 07:52 AM    BUNCREATRAT 15 01/21/2011 10:15 AM    GLOMRATE >59 01/21/2011 10:15 AM      Lab Results   Component Value Date/Time    PROTHROMBTM 13.1 10/09/2022 09:39 AM    INR 1.00 10/09/2022 09:39 AM      Lab Results   Component Value Date/Time    WBC 8.8 10/08/2022 03:09 AM    WBC 6.7 01/21/2011 10:15 AM    RBC 4.90 10/08/2022 03:09 AM    RBC 5.11 (H) 01/21/2011 10:15 AM    HEMOGLOBIN 15.0 10/08/2022 03:09 AM    HEMATOCRIT 44.1 10/08/2022 03:09 AM    MCV 90.0 10/08/2022 03:09 AM    MCV 90 01/21/2011 10:15 AM    MCH 30.6 10/08/2022 03:09 AM    MCH 30.1 01/21/2011 10:15 AM    MCHC 34.0 10/08/2022 03:09 AM    MPV 10.3 10/08/2022 03:09 AM    NEUTSPOLYS 71.50 10/08/2022 03:09 AM    LYMPHOCYTES 18.60 (L) 10/08/2022 03:09 AM    MONOCYTES 8.40 10/08/2022 03:09 AM    EOSINOPHILS 0.70 10/08/2022 03:09 AM    BASOPHILS 0.50 10/08/2022 03:09 AM        IMAGING REVIEWED AND INTERPRETED:    ECHOCARDIOGRAM: 10/7/22 [facility] [place]  Hyperdynamic left ventricular systolic function.  The left ventricular ejection fraction is visually estimated to be   greater than 75%.  Distal septal hypokinesis.  Mild concentric left ventricular hypertrophy.  LVOT  outflow tract obstruction noted 36 mmHg.  Mild mitral regurgitation.  Normal right ventricular size and systolic function.  Normal right ventricular size and systolic function.       ANGIOGRAM: 10/9/22 [facility] [place]  HEMODYNAMICS:   Aortic pressure: 82/40 mmHg (waveforms appear dampened and may be inaccurate)  Pre A-wave pressure: 8 mmHg (waveforms appear dampened and may be inaccurate)  No significant aortic gradient on pullback     CORONARY ANGIOGRAPHY:  The left main coronary artery is a short tapering vessel with distal 20% disease that bifurcates into the left anterior descending and left circumflex coronary arteries.  The left anterior descending coronary artery is a heavily diseased vessel with ostial to proximal 50% disease, a long segment of mid 80% disease after the takeoff of the first diagonal branch and involving the ostium of the second diagonal branch, a relatively healthy mid-distal segment, and then tapering distal luminal irregularities.  It supplies two small diffusely diseased proximal diagonal branches and a small-moderate third diagonal branch.  The left circumflex coronary artery is a large, nondominant vessel that supplies a large first obtuse marginal branch and a small second obtuse marginal branch and has luminal irregularities in the distribution.  The right coronary artery is a large, dominant vessel with ostial 50% disease, mid 30% disease, and a hazy distal bifurcation with ostial 60-70% disease in both a large posterior descending coronary and a large posterolateral branch.     IMPRESSION:  Extensively diseased left anterior descending coronary from the ostium to mid segment of the vessel with a relatively healthy mid-distal segment.  Angiographically moderate ostial and moderate-severe distal bifurcation right coronary artery disease.  Minimal luminal irregularities in the left circumflex coronary artery distribution.     RECOMMENDATIONS:  Return to floor with continued care per primary  service.  TR band release per protocol.  Further evaluation of the patient's diabetes status as her glycosylated hemoglobin A1c was in the prediabetic range, however, consecutive fasting glucose measurements were in the diabetic range, although the patient is currently in a high stress situation.  Strong consideration for coronary artery bypass grafting if diabetes is confirmed given extensive left anterior descending coronary disease and distal bifurcation right coronary artery disease.  Continue optimal medical management of hypertension and coronary artery disease.    REVIEW OF SYSTEMS:   Review of Systems   Constitutional:  Positive for malaise/fatigue. Negative for chills and weight loss.   HENT: Negative.     Eyes: Negative.    Respiratory: Negative.  Negative for shortness of breath.    Cardiovascular:  Positive for chest pain (off and on prior to admit now resolved).   Gastrointestinal:  Positive for heartburn. Negative for abdominal pain.   Genitourinary: Negative.    Skin: Negative.    Neurological: Negative.    Endo/Heme/Allergies: Negative.    Psychiatric/Behavioral: Negative.         PHYSICAL EXAMINATION:   Physical Exam  Vitals reviewed.   Constitutional:       General: She is not in acute distress.     Appearance: Normal appearance.   HENT:      Head: Normocephalic and atraumatic.      Right Ear: External ear normal.      Left Ear: External ear normal.      Nose: Nose normal. No congestion.   Eyes:      General: No scleral icterus.     Extraocular Movements: Extraocular movements intact.      Conjunctiva/sclera: Conjunctivae normal.   Cardiovascular:      Rate and Rhythm: Normal rate and regular rhythm.   Pulmonary:      Effort: Pulmonary effort is normal. No respiratory distress.   Abdominal:      General: Abdomen is flat. There is no distension.   Musculoskeletal:         General: Normal range of motion.      Cervical back: Normal range of motion.   Skin:     General: Skin is warm and dry.  "  Neurological:      Mental Status: She is alert and oriented to person, place, and time. Mental status is at baseline.      Cranial Nerves: No cranial nerve deficit.   Psychiatric:         Mood and Affect: Mood normal.         Judgment: Judgment normal.     /83   Pulse 95   Temp 37.1 °C (98.8 °F) (Temporal)   Resp 16   Ht 1.676 m (5' 6\")   Wt 96.5 kg (212 lb 11.9 oz)   LMP 06/01/2020 (Approximate)   SpO2 94%   BMI 34.34 kg/m²        IMPRESSION:  55 yo woman with known medical conditions of hypertension, SVT, palpitations, hypothyroidism presenting with NSTEMI and multivessel CAD.      PLAN:  I recommend CABG x 2 this admission    The procedure, its risks, benefits, potential complications and alternative treatments were discussed with the patient in detail including the risks should she decide not to undergo my recommended treatment. All of her questions were answered to her satisfaction and she is willing to proceed with the operation. The risks include death, stroke,  infection: to include a rare bacterial infection related to the use of the heart/lung machine, jory-operative myocardial infarction, dysrhythmias, diaphragmatic paralysis, chest wall paresthesia, tracheostomy, kidney or other organ failure, possible return to the operating room for bleeding, bleeding requiring transfusion with its attendant risks including AIDS or hepatitis, dehiscence of surgical incisions, respiratory complications including the need for prolonged ventilator support, Protamine or other drug reaction, peripheral neuropathy, loss of limb, and miscount of surgical items. The operative mortality risk is approximately 1%. The STS mortality risk score is 1% and the morbidity and mortality risk score is 4.1%. The scores were discussed with patient.    The operation,CABG x2 is scheduled for Wednesday 10/12 at 7:30 AM at [facility].     Thank you for this very challenging consultation and participation in " the patient’s care.  I will keep you apprised of all future developments.          Sincerely,       Coretta Smyth MD.

## 2022-10-09 NOTE — PROGRESS NOTES
Assumed care of patient at bedside report from Yeimi KEITA. Pt Aox4, on room air, lying calmly in bed, no signs of distress, and all patient needs addressed during bedside report. Updated on POC. Call light within reach. Patient instructed to press the red call light button when needing assistance. Whiteboard updated. Fall precautions in place. Bed locked and in lowest position. Bed alarm pad under patient and activated. Two side rails up and locked. No complaints of discomfort at this time.

## 2022-10-09 NOTE — FACE TO FACE
Face to Face Supporting Documentation - Home Health    The encounter with this patient was in whole or in part the primary reason for home health admission.    Date of encounter:   Patient:                    MRN:                       YOB: 2021  Clary Bah  [id number]  1965     Home health to see patient for:  Skilled Nursing care for assessment, interventions & education, Physical Therapy evaluation and treatment, and Occupational therapy evaluation and treatment    Skilled need for:  Comment: medication management     Skilled nursing interventions to include:  Comment: medication management     Homebound status evidenced by:  Need the aid of supportive devices such as crutches, canes, wheelchairs or walkers. Leaving home requires a considerable and taxing effort. There is a normal inability to leave the home.    Community Physician to provide follow up care: Neena Dye P.A.-C.     Optional Interventions? No      I certify the face to face encounter for this home health care referral meets the CMS requirements and the encounter/clinical assessment with the patient was, in whole, or in part, for the medical condition(s) listed above, which is the primary reason for home health care. Based on my clinical findings: the service(s) are medically necessary, support the need for home health care, and the homebound criteria are met.  I certify that this patient has had a face to face encounter by myself.  Landon Haider M.D. - NPI: [id number]

## 2022-10-09 NOTE — PROGRESS NOTES
"Cardiology Follow-up Consult Note    Date of Service:    10/9/2022      Consulting Physician: Landon Haider M.D.    Patient ID:    Name:   Clary Bah     YOB: 1965  Age:   56 y.o.  female   MRN:   [id number]      Reason for Consultation: NSTEMI    HPI/Patient ID:    Clary Bah is a 56-year-old woman with history of hypertension, hypothyroidism who presented with chest pain, admitted for NSTEMI and hypertensive emergency.  The patient had recent stress at home due to loss of brother and mother.    Interim Events:  No acute events overnight.  Plan for coronary angiogram today.    Reports mild chest pain this morning.  No other complaints.    On summary, the patient remains in sinus rhythm/sinus tach with heart rate 80s to 110s and rare ectopy     All other review of systems reviewed and negative.      Past medical, surgical, social, and family history reviewed and unchanged from admission except as noted in assessment and plan.    Medications: Reviewed in MAR      Allergies   Allergen Reactions    Ace Inhibitors Cough    Atorvastatin Unspecified     Statin not indicted with coronary calcium score of 0.           Physical Exam  Body mass index is 34.34 kg/m².  /83   Pulse 95   Temp 37.1 °C (98.8 °F) (Temporal)   Resp 16   Ht 1.676 m (5' 6\")   Wt 96.5 kg (212 lb 11.9 oz)   SpO2 95%   Vitals:    10/09/22 0344 10/09/22 0345 10/09/22 0630 10/09/22 0802   BP: 127/77 127/77 109/66 125/83   Pulse: 84   95   Resp: 18   16   Temp: 36.7 °C (98.1 °F)   37.1 °C (98.8 °F)   TempSrc: Temporal   Temporal   SpO2: 92% 92% 92% 95%   Weight:       Height:         Oxygen Therapy:  Pulse Oximetry: 95 %, O2 (LPM): 0, O2 Delivery Device: None - Room Air    General: Well appearing and in no apparent distress  Eyes: nl conjunctiva  ENT: OP clear  Neck: JVP normal  Lungs: normal respiratory effort, CTAB  Heart: RRR, no murmurs, no rubs or gallops, no edema bilateral lower extremities. No LV/RV heave on cardiac " palpatation. + bilateral radial pulses.  + bilateral dp pulses.   Abdomen: soft, non tender, non distended, no masses, normal bowel sounds.  No HSM.  Extremities/MSK: no clubbing, no cyanosis  Neurological: No focal sensory deficits  Psychiatric: Appropriate affect, A/O x 3  Skin: Warm extremities      Labs (personally reviewed and notable for):   Lab Results   Component Value Date/Time    SODIUM 136 10/08/2022 03:09 AM    POTASSIUM 4.1 10/08/2022 03:09 AM    CHLORIDE 105 10/08/2022 03:09 AM    CO2 18 (L) 10/08/2022 03:09 AM    GLUCOSE 136 (H) 10/08/2022 03:09 AM    BUN 13 10/08/2022 03:09 AM    CREATININE 0.61 10/08/2022 03:09 AM    CREATININE 0.72 01/21/2011 10:15 AM    BUNCREATRAT 19 07/06/2018 07:52 AM    BUNCREATRAT 15 01/21/2011 10:15 AM    GLOMRATE >59 01/21/2011 10:15 AM      Lab Results   Component Value Date/Time    WBC 8.8 10/08/2022 03:09 AM    WBC 6.7 01/21/2011 10:15 AM    RBC 4.90 10/08/2022 03:09 AM    RBC 5.11 (H) 01/21/2011 10:15 AM    HEMOGLOBIN 15.0 10/08/2022 03:09 AM    HEMATOCRIT 44.1 10/08/2022 03:09 AM    MCV 90.0 10/08/2022 03:09 AM    MCV 90 01/21/2011 10:15 AM    MCH 30.6 10/08/2022 03:09 AM    MCH 30.1 01/21/2011 10:15 AM    MCHC 34.0 10/08/2022 03:09 AM    MPV 10.3 10/08/2022 03:09 AM    NEUTSPOLYS 71.50 10/08/2022 03:09 AM    LYMPHOCYTES 18.60 (L) 10/08/2022 03:09 AM    MONOCYTES 8.40 10/08/2022 03:09 AM    EOSINOPHILS 0.70 10/08/2022 03:09 AM    BASOPHILS 0.50 10/08/2022 03:09 AM          Cardiac Imaging and Procedures Review:    EKG dated 10/8/2022: Sinus rhythm, abnormal T wave in anterior leads    Echo dated 10/7/2020:   CONCLUSIONS  Hyperdynamic left ventricular systolic function.  The left ventricular ejection fraction is visually estimated to be   greater than 75%.  Distal septal hypokinesis.  Mild concentric left ventricular hypertrophy.  LVOT outflow tract obstruction noted 36 mmHg.  Mild mitral regurgitation.  Normal right ventricular size and systolic function.  Normal right  ventricular size and systolic function.         Impression and Medical Decision Making:  Principal Problem:    Hypertensive urgency POA: Yes  Active Problems:    Heart palpitations POA: Yes      Overview: IMO load March 2020    Essential hypertension POA: Yes    Hypothyroid POA: Yes    Pain in the chest POA: Unknown    Hypokalemia POA: Unknown  Resolved Problems:    * No resolved hospital problems. *        Recommendations:  NSTEMI  -Continue aspirin and heparin gtt.  -Start high intensity statin  -Continue carvedilol 25 mg twice daily  -Continue amlodipine and losartan  -We will plan for coronary angiogram today.  Keep n.p.o.    Hypertensive emergency  BP improved overnight  -Continue carvedilol, amlodipine, and losartan as above    Thank you for allowing me to participate in the care of this patient, I will continue to follow.  Please contact me with any questions.      Sunshine Terrell MD  Cardiologist, [facility]

## 2022-10-10 ENCOUNTER — APPOINTMENT (OUTPATIENT)
Dept: CARDIOLOGY | Facility: MEDICAL CENTER | Age: 57
DRG: 233 | End: 2022-10-10
Attending: INTERNAL MEDICINE
Payer: COMMERCIAL

## 2022-10-10 LAB
GLUCOSE BLD STRIP.AUTO-MCNC: 115 MG/DL (ref 65–99)
GLUCOSE BLD STRIP.AUTO-MCNC: 136 MG/DL (ref 65–99)
GLUCOSE BLD STRIP.AUTO-MCNC: 173 MG/DL (ref 65–99)
UFH PPP CHRO-ACNC: 0.27 IU/ML
UFH PPP CHRO-ACNC: 0.34 IU/ML
UFH PPP CHRO-ACNC: 0.38 IU/ML

## 2022-10-10 PROCEDURE — A9270 NON-COVERED ITEM OR SERVICE: HCPCS | Performed by: GENERAL PRACTICE

## 2022-10-10 PROCEDURE — A9270 NON-COVERED ITEM OR SERVICE: HCPCS | Performed by: INTERNAL MEDICINE

## 2022-10-10 PROCEDURE — 770020 HCHG ROOM/CARE - TELE (206)

## 2022-10-10 PROCEDURE — 82962 GLUCOSE BLOOD TEST: CPT | Mod: 91

## 2022-10-10 PROCEDURE — 99232 SBSQ HOSP IP/OBS MODERATE 35: CPT | Performed by: INTERNAL MEDICINE

## 2022-10-10 PROCEDURE — 700111 HCHG RX REV CODE 636 W/ 250 OVERRIDE (IP): Performed by: STUDENT IN AN ORGANIZED HEALTH CARE EDUCATION/TRAINING PROGRAM

## 2022-10-10 PROCEDURE — 85520 HEPARIN ASSAY: CPT

## 2022-10-10 PROCEDURE — 700111 HCHG RX REV CODE 636 W/ 250 OVERRIDE (IP)

## 2022-10-10 PROCEDURE — 700102 HCHG RX REV CODE 250 W/ 637 OVERRIDE(OP): Performed by: INTERNAL MEDICINE

## 2022-10-10 PROCEDURE — 99232 SBSQ HOSP IP/OBS MODERATE 35: CPT | Performed by: PHYSICIAN ASSISTANT

## 2022-10-10 PROCEDURE — 700102 HCHG RX REV CODE 250 W/ 637 OVERRIDE(OP): Performed by: GENERAL PRACTICE

## 2022-10-10 PROCEDURE — A9270 NON-COVERED ITEM OR SERVICE: HCPCS | Performed by: STUDENT IN AN ORGANIZED HEALTH CARE EDUCATION/TRAINING PROGRAM

## 2022-10-10 PROCEDURE — 700102 HCHG RX REV CODE 250 W/ 637 OVERRIDE(OP): Performed by: STUDENT IN AN ORGANIZED HEALTH CARE EDUCATION/TRAINING PROGRAM

## 2022-10-10 PROCEDURE — 36415 COLL VENOUS BLD VENIPUNCTURE: CPT

## 2022-10-10 RX ORDER — DEXTROSE MONOHYDRATE 25 G/50ML
25 INJECTION, SOLUTION INTRAVENOUS
Status: DISCONTINUED | OUTPATIENT
Start: 2022-10-10 | End: 2022-10-12

## 2022-10-10 RX ADMIN — ONDANSETRON 4 MG: 4 TABLET, ORALLY DISINTEGRATING ORAL at 20:37

## 2022-10-10 RX ADMIN — CARVEDILOL 25 MG: 25 TABLET, FILM COATED ORAL at 07:47

## 2022-10-10 RX ADMIN — HEPARIN SODIUM 2000 UNITS: 1000 INJECTION, SOLUTION INTRAVENOUS; SUBCUTANEOUS at 11:45

## 2022-10-10 RX ADMIN — INSULIN HUMAN 1 UNITS: 100 INJECTION, SOLUTION PARENTERAL at 20:37

## 2022-10-10 RX ADMIN — LABETALOL HYDROCHLORIDE 20 MG: 5 INJECTION INTRAVENOUS at 05:11

## 2022-10-10 RX ADMIN — ASPIRIN 81 MG 81 MG: 81 TABLET ORAL at 05:11

## 2022-10-10 RX ADMIN — AMLODIPINE BESYLATE 10 MG: 10 TABLET ORAL at 05:11

## 2022-10-10 RX ADMIN — LOSARTAN POTASSIUM 100 MG: 50 TABLET, FILM COATED ORAL at 05:10

## 2022-10-10 RX ADMIN — CARVEDILOL 25 MG: 25 TABLET, FILM COATED ORAL at 16:58

## 2022-10-10 RX ADMIN — ROSUVASTATIN CALCIUM 20 MG: 20 TABLET, FILM COATED ORAL at 16:58

## 2022-10-10 RX ADMIN — LEVOTHYROXINE SODIUM 100 MCG: 0.1 TABLET ORAL at 05:10

## 2022-10-10 RX ADMIN — HEPARIN SODIUM 14 UNITS/KG/HR: 5000 INJECTION, SOLUTION INTRAVENOUS at 22:41

## 2022-10-10 ASSESSMENT — ENCOUNTER SYMPTOMS
COUGH: 0
PND: 0
SHORTNESS OF BREATH: 0
HEARTBURN: 1
ABDOMINAL PAIN: 0
DIZZINESS: 0
ORTHOPNEA: 0
BLOOD IN STOOL: 0
PALPITATIONS: 0
CHILLS: 0
NAUSEA: 0
FEVER: 0

## 2022-10-10 ASSESSMENT — PAIN DESCRIPTION - PAIN TYPE: TYPE: ACUTE PAIN

## 2022-10-10 NOTE — DISCHARGE INSTRUCTIONS
Discharge Instructions    Discharged to home by car with relative. Discharged via wheelchair, hospital escort: Yes.  Special equipment needed: Not Applicable    Be sure to schedule a follow-up appointment with your primary care doctor or any specialists as instructed.     Discharge Plan:   Diet Plan: Discussed  Activity Level: Discussed  Confirmed Follow up Appointment: Appointment Scheduled  Confirmed Symptoms Management: Discussed  Medication Reconciliation Updated: Yes  Influenza Vaccine Indication: Indicated: 9 to 64 years of age    I understand that a diet low in cholesterol, fat, and sodium is recommended for good health. Unless I have been given specific instructions below for another diet, I accept this instruction as my diet prescription.   Other diet: Cardiac    Special Instructions: DIVISION OF CARDIAC SURGERY DISCHARGE INSTRUCTIONS    Activity:    NO driving for 4 weeks after surgery. You may ride as a passenger.  NO lifting more than 10 pounds for 6 weeks.  For the next 6 weeks, keep your elbows close to your body and move within a pain-free motion when lifting, pushing or pulling.  Do not stretch both arms backwards at the same time.    Walk at least 4 times per day, there is no maximum.  Other physical activities (sex, housework, gardening, etc.) are okay after 4 weeks.  Continue using incentive spirometer for 2 weeks.  If you are going home on oxygen and you were not on oxygen prior to surgery, keep using until you are oxygen free.  Weigh yourself daily.  Call your Cardiologist for a weight gain of 2 or more pounds within 48 hours.  Take all of your medications as prescribed    Incision Care:    SHOWER EVERYDAY-no baths. Make sure to clean your incision(s) twice daily with plain, perfume and dye-free soap.  Then pat incision(s) dry with clean towel. No creams or lotions on your incision(s).    If you are discharging with a Prevena bandage on your chest, you or your home health nurse may remove the  bandage 7 days after surgery, or sooner if the battery runs out or the device alarms. When the battery runs out, the bandage will lose suction and it will puff up.  To remove, slowly roll down the edges of the bandage. Throw away the entire bandage and the battery pack.  Keep the incision open to air and clean twice daily with soap and water.  If there is any increased redness or swelling, separation of the incision line, or thick drainage from any of your incisions, call the Cardiac Surgeons ([phone]).    Continue to wear your CHRISTIANNE Stockings for 4 weeks. You may take them off when you are in bed or when your legs are elevated.    General Instructions:    You have been referred to Cardiac Rehab.  You can start Cardiac Rehab 30 days after surgery.  If you do not have an appointment at the time of discharge call [phone] to schedule an appointment.  Your Primary Care Doctor typically handles home oxygen. Oxygen may be stopped when your oxygen level is consistently greater than 90.  Check with your Primary Care Doctor if you are unsure.  Take all of your medications (including pain medications) as prescribed.  Taking medications other than prescribed can result in serious injury.    For Patients Discharged with Narcotic Pain Medication:     If a refill is needed, understand that only 1 refill will be provided and you must come to the Cardiac Surgeons’ office for an appointment (72 hours’ notice is required to schedule and there are no weekend appointments).  If the pain medications you are discharged on are not working, you will need to bring your remaining prescription into the office in order to receive a new prescription.  If you were taking narcotics prior to your heart surgery, the Cardiac Surgeons will provide you with one prescription and additional medications will need to be provided by your pain management doctor.  Do not drink alcohol while taking narcotics.  Lost or stolen medications will not be  refilled.  If medications are stolen, report to law enforcement.    Contact Cardiac Surgery at [phone] if you have any questions.    -Is this patient being discharged with medication to prevent blood clots?  Yes, Aspirin   Aspirin, ASA oral tablets  What is this medicine?  ASPIRIN (AS pir in) is a pain reliever. It is used to treat mild pain and fever. This medicine is also used as directed by a doctor to prevent and to treat heart attacks, to prevent strokes and blood clots, and to treat arthritis or inflammation.  This medicine may be used for other purposes; ask your health care provider or pharmacist if you have questions.  COMMON BRAND NAME(S): Aspir-Low, Aspir-Mary, Aspirtab, Ella Advanced Aspirin, Ella Aspirin, Ella Aspirin Extra Strength, Ella Aspirin Plus, Ella Extra Strength, Ella Extra Strength Plus, Ella Genuine Aspirin, Ella Womens Aspirin, Bufferin, Bufferin Extra Strength, Bufferin Low Dose  What should I tell my health care provider before I take this medicine?  They need to know if you have any of these conditions:  anemia  asthma  bleeding problems  child with chickenpox, the flu, or other viral infection  diabetes  gout  if you frequently drink alcohol containing drinks  kidney disease  liver disease  low level of vitamin K  lupus  smoke tobacco  stomach ulcers or other problems  an unusual or allergic reaction to aspirin, tartrazine dye, other medicines, dyes, or preservatives  pregnant or trying to get pregnant  breast-feeding  How should I use this medicine?  Take this medicine by mouth with a glass of water. Follow the directions on the package or prescription label. You can take this medicine with or without food. If it upsets your stomach, take it with food. Do not take your medicine more often than directed.  Talk to your pediatrician regarding the use of this medicine in children. While this drug may be prescribed for children as young as 12 years of age for selected  conditions, precautions do apply. Children and teenagers should not use this medicine to treat chicken pox or flu symptoms unless directed by a doctor.  Patients over 65 years old may have a stronger reaction and need a smaller dose.  Overdosage: If you think you have taken too much of this medicine contact a poison control center or emergency room at once.  NOTE: This medicine is only for you. Do not share this medicine with others.  What if I miss a dose?  If you are taking this medicine on a regular schedule and miss a dose, take it as soon as you can. If it is almost time for your next dose, take only that dose. Do not take double or extra doses.  What may interact with this medicine?  Do not take this medicine with any of the following medications:  cidofovir  ketorolac  probenecid  This medicine may also interact with the following medications:  alcohol  alendronate  bismuth subsalicylate  flavocoxid  herbal supplements like feverfew, garlic, luzma, ginkgo biloba, horse chestnut  medicines for diabetes or glaucoma like acetazolamide, methazolamide  medicines for gout  medicines that treat or prevent blood clots like enoxaparin, heparin, ticlopidine, warfarin  other aspirin and aspirin-like medicines  NSAIDs, medicines for pain and inflammation, like ibuprofen or naproxen  pemetrexed  sulfinpyrazone  varicella live vaccine  This list may not describe all possible interactions. Give your health care provider a list of all the medicines, herbs, non-prescription drugs, or dietary supplements you use. Also tell them if you smoke, drink alcohol, or use illegal drugs. Some items may interact with your medicine.  What should I watch for while using this medicine?  If you are treating yourself for pain, tell your doctor or health care professional if the pain lasts more than 10 days, if it gets worse, or if there is a new or different kind of pain. Tell your doctor if you see redness or swelling. Also, check with your  doctor if you have a fever that lasts for more than 3 days. Only take this medicine to prevent heart attacks or blood clotting if prescribed by your doctor or health care professional.  Do not take aspirin or aspirin-like medicines with this medicine. Too much aspirin can be dangerous. Always read the labels carefully.  This medicine can irritate your stomach or cause bleeding problems. Do not smoke cigarettes or drink alcohol while taking this medicine. Do not lie down for 30 minutes after taking this medicine to prevent irritation to your throat.  If you are scheduled for any medical or dental procedure, tell your healthcare provider that you are taking this medicine. You may need to stop taking this medicine before the procedure.  This medicine may be used to treat migraines. If you take migraine medicines for 10 or more days a month, your migraines may get worse. Keep a diary of headache days and medicine use. Contact your healthcare professional if your migraine attacks occur more frequently.  What side effects may I notice from receiving this medicine?  Side effects that you should report to your doctor or health care professional as soon as possible:  allergic reactions like skin rash, itching or hives, swelling of the face, lips, or tongue  breathing problems  changes in hearing, ringing in the ears  confusion  general ill feeling or flu-like symptoms  pain on swallowing  redness, blistering, peeling or loosening of the skin, including inside the mouth or nose  signs and symptoms of bleeding such as bloody or black, tarry stools; red or dark-brown urine; spitting up blood or brown material that looks like coffee grounds; red spots on the skin; unusual bruising or bleeding from the eye, gums, or nose  trouble passing urine or change in the amount of urine  unusually weak or tired  yellowing of the eyes or skin  Side effects that usually do not require medical attention (report to your doctor or health care  professional if they continue or are bothersome):  diarrhea or constipation  headache  nausea, vomiting  stomach gas, heartburn  This list may not describe all possible side effects. Call your doctor for medical advice about side effects. You may report side effects to FDA at 1-410-HPC-6243.  Where should I keep my medicine?  Keep out of the reach of children.  Store at room temperature between 15 and 30 degrees C (59 and 86 degrees F). Protect from heat and moisture. Do not use this medicine if it has a strong vinegar smell. Throw away any unused medicine after the expiration date.  NOTE: This sheet is a summary. It may not cover all possible information. If you have questions about this medicine, talk to your doctor, pharmacist, or health care provider.  © 2020 [employer] (2018-01-30 10:42:13)    Is patient discharged on Warfarin / Coumadin?   No     Coronary Artery Bypass Grafting, Care After  This sheet gives you information about how to care for yourself after your procedure. Your health care provider may also give you more specific instructions. If you have problems or questions, contact your health care provider.  What can I expect after the procedure?  After the procedure, it is common to have:  Nausea.  Lack of appetite.  Constipation.  Weakness and fatigue.  Depression or irritability.  Pain or discomfort in your incision areas.  Follow these instructions at home:  Medicines  Take over-the-counter and prescription medicines only as told by your health care provider. Do not stop taking medicines or start any new medicines without approval from your health care provider.  If you were prescribed an antibiotic medicine, take it as told by your health care provider. Do not stop using the antibiotic even if you start to feel better.  Incision care    Follow instructions from your health care provider about how to take care of your incisions. Make sure you:  Wash your hands with soap and water before  and after you change your bandage (dressing). If soap and water are not available, use hand .  Change your dressing as told by your health care provider.  Leave stitches (sutures), skin glue, or adhesive strips in place. These skin closures may need to stay in place for 2 weeks or longer. If adhesive strip edges start to loosen and curl up, you may trim the loose edges. Do not remove adhesive strips completely unless your health care provider tells you to do that.  Keep incision areas clean, dry, and protected.  Check your incision areas every day for signs of infection. Check for:  More redness, swelling, or pain.  More fluid or blood.  Warmth.  Pus or a bad smell.  If incisions were made in your legs:  Avoid crossing your legs.  Avoid sitting for long periods of time. Change positions every 30 minutes.  Raise (elevate) your legs when you are sitting.  Bathing  Do not take baths, swim, or use a hot tub until your health care provider approves.  Only take sponge baths. Pat the incisions dry. Do not rub incisions with a washcloth or towel.  Ask your health care provider when you can shower.  Eating and drinking    Eat foods that are high in fiber, such as beans, nuts, whole grains, and raw fruits and vegetables. Meats, if eaten, should be lean cut. Avoid canned, processed, and fried foods. This can help prevent constipation and is a recommended part of a heart-healthy diet.  Drink enough fluid to keep your urine pale yellow.  Do not drink alcohol until your recovery is complete. Ask your health care provider when it is safe to drink alcohol.  Activity  Rest and limit your activity as told by your health care provider. You may be instructed to:  Stop any activity right away if you have chest pain, shortness of breath, irregular heartbeats, or dizziness. Get help right away if you have any of these symptoms.  Move around frequently for short periods or take short walks as told by your health care provider.  Gradually increase your activities.  Avoid lifting, pushing, or pulling anything that is heavier than 10 lb (4.5 kg) for at least 6 weeks or as told by your health care provider.  Participate in physical therapy or a cardiac rehabilitation program as told by your health care provider.  Physical therapy involves doing exercises to maintain movement, strengthen your muscles, and build your endurance.  A cardiac rehabilitation program is a treatment program to improve your health and well-being through exercise training, education, and counseling.  Do not drive until your health care provider approves.  Ask your health care provider when you may return to work.  Ask your health care provider when you may resume sexual activity.  General instructions  Do not drive or use heavy machinery while taking prescription pain medicine.  Do not use any products that contain nicotine or tobacco, such as cigarettes, e-cigarettes, and chewing tobacco. If you need help quitting, ask your health care provider.  Take 2-3 deep breaths every few hours during the day, while you recover. This helps expand your lungs and prevent complications like pneumonia after surgery.  If you were given a device called an incentive spirometer, use it several times a day to practice deep breathing. Support your chest with a pillow or your arms when you take deep breaths or cough.  Wear compression stockings as told by your health care provider. These stockings help to prevent blood clots and reduce swelling in your legs.  Weigh yourself every day. This helps identify if your body is holding (retaining) fluid that may make your heart and lungs work harder.  Keep all follow-up visits as told by your health care provider. This is important.  Contact a health care provider if:  You have more redness, swelling, or pain around any incision.  You have more fluid or blood coming from any incision.  Any incision feels warm to the touch.  You have pus or a bad  "smell coming from any incision.  You have a fever.  You have swelling in your ankles or legs.  You have pain in your legs.  You gain 2 lb (0.9 kg) or more a day.  You are nauseous or you vomit.  You have diarrhea.  Get help right away if:  You have chest pain that spreads to your jaw or arms.  You are short of breath.  You have a fast or irregular heartbeat.  You notice a \"clicking\" in your breastbone (sternum) when you move.  You have any symptoms of a stroke. \"BE FAST\" is an easy way to remember the main warning signs of a stroke:  B - Balance. Signs are dizziness, sudden trouble walking, or loss of balance.  E - Eyes. Signs are trouble seeing or a sudden change in vision.  F - Face. Signs are sudden weakness or numbness of the face, or the face or eyelid drooping on one side.  A - Arms. Signs are weakness or numbness in an arm. This happens suddenly and usually on one side of the body.  S - Speech. Signs are sudden trouble speaking, slurred speech, or trouble understanding what people say.  T - Time. Time to call emergency services. Write down what time symptoms started.  You have other signs of a stroke, such as:  A sudden, severe headache with no known cause.  Nausea or vomiting.  Seizure.  These symptoms may represent a serious problem that is an emergency. Do not wait to see if the symptoms will go away. Get medical help right away. Call your local emergency services (911 in the [place] Do not drive yourself to the hospital.  Summary  After the procedure, it is common to have pain or discomfort in the incision areas.  Do not take baths, swim, or use a hot tub until your health care provider approves.  Gradually increase your activities. You may need physical therapy or cardiac rehabilitation to help strengthen your muscles and build your endurance.  Weigh yourself every day. This helps identify if your body is holding (retaining) fluid that may make your heart and lungs work harder.  This information is not " intended to replace advice given to you by your health care provider. Make sure you discuss any questions you have with your health care provider.  Document Released: 07/07/2006 Document Revised: 08/27/2019 Document Reviewed: 08/27/2019  Elsevier Patient Education © 2020 [employer]      Coronary Artery Bypass Grafting    Coronary artery bypass grafting (CABG) is a surgery to bypassor to fix arteries of the heart (coronary arteries) that are narrow or blocked. This narrowing is usually the result of a buildup of fatty deposits (plaques) in the walls of the vessels. The coronary arteries supply the heart with the oxygen and nutrients that it needs to pump blood through your body.  In this surgery, a section of blood vessel from another part of the body (usually the chest, arm, or leg) is removed and then placed where it will allow blood to flow around the damaged part of the coronary artery. The new section of blood vessel is called the graft.  Tell a health care provider about:  Any allergies you have.  All medicines you are taking or using, including steroids, blood thinners, vitamins, herbs, eye drops, creams, and over-the-counter medicines.  Any problems you or family members have had with anesthetic medicines.  Any blood disorders you have.  Any surgeries you have had.  Any medical conditions you have.  Whether you are pregnant or may be pregnant.  What are the risks?  Generally, this is a safe procedure. However, problems may occur, including:  Bleeding, which may require transfusions.  Infection.  Allergic reactions to medicines or dyes.  Pain at the surgical site.  Damage to other structures or organs.  Short-term memory loss, confusion, and personality changes.  Heart rhythm problems (arrhythmias).  Stroke.  Heart attack during or after surgery.  Kidney failure.  What happens before the procedure?  Staying hydrated  Follow instructions from your health care provider about hydration, which may include:  Up  to 2 hours before the procedure - you may continue to drink clear liquids, such as water, clear fruit juice, black coffee, and plain tea.    Eating and drinking  Follow instructions from your health care provider about eating and drinking, which may include:  8 hours before the procedure - stop eating heavy meals or foods, such as meat, fried foods, or fatty foods.  6 hours before the procedure - stop eating light meals or foods, such as toast or cereal.  6 hours before the procedure - stop drinking milk or drinks that contain milk.  2 hours before the procedure - stop drinking clear liquids.  Medicines  Take over-the-counter and prescription medicines only as told by your health care provider.  Ask your health care provider about:  Changing or stopping your regular medicines. This is especially important if you are taking diabetes medicines or blood thinners. You may be asked to start new medicines and stop taking others. Do not stop medicines or adjust dosages on your own.  Taking medicines such as aspirin and ibuprofen. These medicines can thin your blood. Do not take these medicines unless your health care provider tells you to take them.  Taking over-the-counter medicines, vitamins, herbs, and supplements.  General instructions  Ask your health care provider:  How your surgical site will be marked or identified.  What steps will be taken to help prevent infection. These may include:  Removing hair at the surgery site.  Washing skin with a germ-killing soap.  Taking antibiotic medicine.  You may be asked to shower with a germ-killing soap.  For 3-6 weeks before the procedure, do not use any products that contain nicotine or tobacco, such as cigarettes, e-cigarettes, and chewing tobacco. Quitting smoking is one of the best things you can do for your heart health. If you need help quitting, ask your health care provider.  Talk with your health care provider about where the grafts will be taken from for your  surgery.  What happens during the procedure?  An IV will be inserted into one of your veins.  You will be given one or more of the following:  A medicine to help you relax (sedative).  A medicine to make you fall asleep (general anesthetic).  An incision will be made down the front of the chest through the breastbone (sternum). The sternum will be opened so the surgeon can see the heart.  You may or may not be placed on a heart-lung bypass machine. If this machine is used, your heart will be temporarily stopped. The machine will provide oxygen to your blood while the surgeon works on your heart. If the machine is not used, it is called beating heart bypass surgery.  A section of blood vessel will be removed from another part of your body (usually the chest, arm, or leg).  The blood vessel will be attached above and below the blocked artery of your heart. This may be done on more than one artery of the heart.  When the bypass is done, you will be taken off the heart-lung machine if it was used.  If your heart was stopped, it will be restarted and will take over again normally.  Your chest will be closed with special surgical wire to hold your bones together for healing.  Your incision(s) will be closed with stitches (sutures), skin glue, or adhesive strips.  Bandages (dressings) will be placed over the incision(s).  Tubes will remain in your chest and will be connected to a suction device to help drain fluid and reinflate the lungs.  The procedure may vary among health care providers and hospitals.  What happens after the procedure?  Your blood pressure, heart rate, breathing rate, and blood oxygen level will be monitored until you leave the hospital.  You may wake up with a tube in your throat to help your breathing. You may be connected to a breathing machine. You will not be able to talk while the tube is in place. The tube will be taken out as soon as it is safe.  You will be groggy and may have some pain. You  will be given pain medicine to help control the pain.  You may be in the intensive care unit for 1-2 days.  You may be given oxygen to help you breathe.  You will be shown how to do deep breathing exercises.  You may have to wear compression stockings. These stockings help to prevent blood clots and reduce swelling in your legs.  You may be given new medicines to take after your surgery.  Cardiac rehabilitation will be started while you are in the hospital. This may include education and exercises to help you recover from your surgery.  Summary  In this procedure, a section of blood vessel from another part of the body (usually the chest, arm, or leg) is removed and then placed where it will allow blood to bypass the narrowed or blocked part of the coronary artery.  For 3-6 weeks before the procedure, do not use any products that contain nicotine or tobacco, such as cigarettes, e-cigarettes, and chewing tobacco. If you need help quitting, ask your health care provider.  You may or may not be placed on a heart-lung bypass machine during the surgery. If used, this machine will provide oxygen to your blood while the surgeon works on your heart.  You may wake up with a tube in your throat to help your breathing. You may be connected to a breathing machine. The tube will be taken out as soon as it is safe.  This information is not intended to replace advice given to you by your health care provider. Make sure you discuss any questions you have with your health care provider.  Document Released: 09/27/2006 Document Revised: 08/27/2019 Document Reviewed: 08/27/2019  [employer] Patient Education © 2020 [employer]    Amiodarone tablets  What is this medicine?  AMIODARONE (a JAVI oh da imer) is an antiarrhythmic drug. It helps make your heart beat regularly. Because of the side effects caused by this medicine, it is only used when other medicines have not worked. It is usually used for heartbeat problems that may be life  threatening.  This medicine may be used for other purposes; ask your health care provider or pharmacist if you have questions.  COMMON BRAND NAME(S): Cordarone, Pacerone  What should I tell my health care provider before I take this medicine?  They need to know if you have any of these conditions:  liver disease  lung disease  other heart problems  thyroid disease  an unusual or allergic reaction to amiodarone, iodine, other medicines, foods, dyes, or preservatives  pregnant or trying to get pregnant  breast-feeding  How should I use this medicine?  Take this medicine by mouth with a glass of water. Follow the directions on the prescription label. You can take this medicine with or without food. However, you should always take it the same way each time. Take your doses at regular intervals. Do not take your medicine more often than directed. Do not stop taking except on the advice of your doctor or health care professional.  A special MedGuide will be given to you by the pharmacist with each prescription and refill. Be sure to read this information carefully each time.  Talk to your pediatrician regarding the use of this medicine in children. Special care may be needed.  Overdosage: If you think you have taken too much of this medicine contact a poison control center or emergency room at once.  NOTE: This medicine is only for you. Do not share this medicine with others.  What if I miss a dose?  If you miss a dose, take it as soon as you can. If it is almost time for your next dose, take only that dose. Do not take double or extra doses.  What may interact with this medicine?  Do not take this medicine with any of the following medications:  abarelix  apomorphine  arsenic trioxide  certain antibiotics like erythromycin, gemifloxacin, levofloxacin, pentamidine  certain medicines for depression like amoxapine, tricyclic antidepressants  certain medicines for fungal infections like fluconazole, itraconazole,  ketoconazole, posaconazole, voriconazole  certain medicines for irregular heart beat like disopyramide, dronedarone, ibutilide, propafenone, sotalol  certain medicines for malaria like chloroquine, halofantrine  cisapride  droperidol  haloperidol  haw[place]orn  maprotiline  methadone  phenothiazines like chlorpromazine, mesoridazine, thioridazine  pimozide  ranolazine  red yeast rice  vardenafil  This medicine may also interact with the following medications:  antiviral medicines for HIV or AIDS  certain medicines for blood pressure, heart disease, irregular heart beat  certain medicines for cholesterol like atorvastatin, cerivastatin, lovastatin, simvastatin  certain medicines for hepatitis C like sofosbuvir and ledipasvir; sofosbuvir  certain medicines for seizures like phenytoin  certain medicines for thyroid problems  certain medicines that treat or prevent blood clots like warfarin  cholestyramine  cimetidine  clopidogrel  cyclosporine  dextromethorphan  diuretics  dofetilide  fentanyl  general anesthetics  grapefruit juice  lidocaine  loratadine  methotrexate  other medicines that prolong the QT interval (cause an abnormal heart rhythm)  procainamide  quinidine  rifabutin, rifampin, or rifapentine  [place]'s Wort  trazodone  ziprasidone  This list may not describe all possible interactions. Give your health care provider a list of all the medicines, herbs, non-prescription drugs, or dietary supplements you use. Also tell them if you smoke, drink alcohol, or use illegal drugs. Some items may interact with your medicine.  What should I watch for while using this medicine?  Your condition will be monitored closely when you first begin therapy. Often, this drug is first started in a hospital or other monitored health care setting. Once you are on maintenance therapy, visit your doctor or health care professional for regular checks on your progress. Because your condition and use of this medicine carry some risk, it  is a good idea to carry an identification card, necklace or bracelet with details of your condition, medications, and doctor or health care professional.  You may get drowsy or dizzy. Do not drive, use machinery, or do anything that needs mental alertness until you know how this medicine affects you. Do not stand or sit up quickly, especially if you are an older patient. This reduces the risk of dizzy or fainting spells.  This medicine can make you more sensitive to the sun. Keep out of the sun. If you cannot avoid being in the sun, wear protective clothing and use sunscreen. Do not use sun lamps or tanning beds/booths.  You should have regular eye exams before and during treatment. Call your doctor if you have blurred vision, see halos, or your eyes become sensitive to light. Your eyes may get dry. It may be helpful to use a lubricating eye solution or artificial tears solution.  If you are going to have surgery or a procedure that requires contrast dyes, tell your doctor or health care professional that you are taking this medicine.  What side effects may I notice from receiving this medicine?  Side effects that you should report to your doctor or health care professional as soon as possible:  allergic reactions like skin rash, itching or hives, swelling of the face, lips, or tongue  blue-gray coloring of the skin  blurred vision, seeing blue green halos, increased sensitivity of the eyes to light  breathing problems  chest pain  dark urine  fast, irregular heartbeat  feeling faint or light-headed  intolerance to heat or cold  nausea or vomiting  pain and swelling of the scrotum  pain, tingling, numbness in feet, hands  redness, blistering, peeling or loosening of the skin, including inside the mouth  spitting up blood  stomach pain  sweating  unusual or uncontrolled movements of body  unusually weak or tired  weight gain or loss  yellowing of the eyes or skin  Side effects that usually do not require medical  attention (report to your doctor or health care professional if they continue or are bothersome):  change in sex drive or performance  constipation  dizziness  headache  loss of appetite  trouble sleeping  This list may not describe all possible side effects. Call your doctor for medical advice about side effects. You may report side effects to FDA at 3-292-HHW-8229.  Where should I keep my medicine?  Keep out of the reach of children.  Store at room temperature between 20 and 25 degrees C (68 and 77 degrees F). Protect from light. Keep container tightly closed. Throw away any unused medicine after the expiration date.  NOTE: This sheet is a summary. It may not cover all possible information. If you have questions about this medicine, talk to your doctor, pharmacist, or health care provider.  © 2020 [employer] (2019-11-20 13:44:04)    Clopidogrel tablets  What is this medicine?  CLOPIDOGREL (kloh PID oh grel) helps to prevent blood clots. This medicine is used to prevent heart attack, stroke, or other vascular events in people who are at high risk.  This medicine may be used for other purposes; ask your health care provider or pharmacist if you have questions.  COMMON BRAND NAME(S): Plavix  What should I tell my health care provider before I take this medicine?  They need to know if you have any of the following conditions:  bleeding disorders  bleeding in the brain  having surgery  history of stomach bleeding  an unusual or allergic reaction to clopidogrel, other medicines, foods, dyes, or preservatives  pregnant or trying to get pregnant  breast-feeding  How should I use this medicine?  Take this medicine by mouth with a glass of water. Follow the directions on the prescription label. You may take this medicine with or without food. If it upsets your stomach, take it with food. Take your medicine at regular intervals. Do not take it more often than directed. Do not stop taking except on your doctor's  advice.  A special MedGuide will be given to you by the pharmacist with each prescription and refill. Be sure to read this information carefully each time.  Talk to your pediatrician regarding the use of this medicine in children. Special care may be needed.  Overdosage: If you think you have taken too much of this medicine contact a poison control center or emergency room at once.  NOTE: This medicine is only for you. Do not share this medicine with others.  What if I miss a dose?  If you miss a dose, take it as soon as you can. If it is almost time for your next dose, take only that dose. Do not take double or extra doses.  What may interact with this medicine?  Do not take this medicine with the following medications:  dasabuvir; ombitasvir; paritaprevir; ritonavir  defibrotide  selexipag  This medicine may also interact with the following medications:  certain medicines that treat or prevent blood clots like warfarin  narcotic medicines for pain  NSAIDs, medicines for pain and inflammation, like ibuprofen or naproxen  repaglinide  SNRIs, medicines for depression, like desvenlafaxine, duloxetine, levomilnacipran, venlafaxine  SSRIs, medicines for depression, like citalopram, escitalopram, fluoxetine, fluvoxamine, paroxetine, sertraline  stomach acid blockers like cimetidine, esomeprazole, omeprazole  This list may not describe all possible interactions. Give your health care provider a list of all the medicines, herbs, non-prescription drugs, or dietary supplements you use. Also tell them if you smoke, drink alcohol, or use illegal drugs. Some items may interact with your medicine.  What should I watch for while using this medicine?  Visit your doctor or health care professional for regular check-ups. Do not stop taking your medicine unless your doctor tells you to.  Notify your doctor or health care professional and seek emergency treatment if you develop breathing problems; changes in vision; chest pain;  severe, sudden headache; pain, swelling, warmth in the leg; trouble speaking; sudden numbness or weakness of the face, arm or leg. These can be signs that your condition has gotten worse.  If you are going to have surgery or dental work, tell your doctor or health care professional that you are taking this medicine.  Certain genetic factors may reduce the effect of this medicine. Your doctor may use genetic tests to determine treatment.  Only take aspirin if you are instructed to. Low doses of aspirin are used with this medicine to treat some conditions. Taking aspirin with this medicine can increase your risk of bleeding so you must be careful. Talk to your doctor or pharmacist if you have questions.  What side effects may I notice from receiving this medicine?  Side effects that you should report to your doctor or health care professional as soon as possible:  allergic reactions like skin rash, itching or hives, swelling of the face, lips, or tongue  signs and symptoms of bleeding such as bloody or black, tarry stools; red or dark-brown urine; spitting up blood or brown material that looks like coffee grounds; red spots on the skin; unusual bruising or bleeding from the eye, gums, or nose  signs and symptoms of a blood clot such as breathing problems; changes in vision; chest pain; severe, sudden headache; pain, swelling, warmth in the leg; trouble speaking; sudden numbness or weakness of the face, arm or leg  signs and symptoms of low blood sugar such as feeling anxious; confusion; dizziness; increased hunger; unusually weak or tired; increased sweating; shakiness; cold, clammy skin; irritable; headache; blurred vision; fast heartbeat; loss of consciousness  Side effects that usually do not require medical attention (report to your doctor or health care professional if they continue or are bothersome):  constipation  diarrhea  headache  upset stomach  This list may not describe all possible side effects. Call  your doctor for medical advice about side effects. You may report side effects to FDA at 6-731-QSM-2540.  Where should I keep my medicine?  Keep out of the reach of children.  Store at room temperature of 59 to 86 degrees F (15 to 30 degrees C). Throw away any unused medicine after the expiration date.  NOTE: This sheet is a summary. It may not cover all possible information. If you have questions about this medicine, talk to your doctor, pharmacist, or health care provider.  © 2020 [employer] (2019-05-20 15:03:38)    Gabapentin capsules or tablets  What is this medicine?  GABAPENTIN (GA ba pen tin) is used to control seizures in certain types of epilepsy. It is also used to treat certain types of nerve pain.  This medicine may be used for other purposes; ask your health care provider or pharmacist if you have questions.  COMMON BRAND NAME(S): Active-PAC with Gabapentin, Gabarone, Neurontin  What should I tell my health care provider before I take this medicine?  They need to know if you have any of these conditions:  history of drug abuse or alcohol abuse problem  kidney disease  lung or breathing disease  suicidal thoughts, plans, or attempt; a previous suicide attempt by you or a family member  an unusual or allergic reaction to gabapentin, other medicines, foods, dyes, or preservatives  pregnant or trying to get pregnant  breast-feeding  How should I use this medicine?  Take this medicine by mouth with a glass of water. Follow the directions on the prescription label. You can take it with or without food. If it upsets your stomach, take it with food. Take your medicine at regular intervals. Do not take it more often than directed. Do not stop taking except on your doctor's advice.  If you are directed to break the 600 or 800 mg tablets in half as part of your dose, the extra half tablet should be used for the next dose. If you have not used the extra half tablet within 28 days, it should be thrown  away.  A special MedGuide will be given to you by the pharmacist with each prescription and refill. Be sure to read this information carefully each time.  Talk to your pediatrician regarding the use of this medicine in children. While this drug may be prescribed for children as young as 3 years for selected conditions, precautions do apply.  Overdosage: If you think you have taken too much of this medicine contact a poison control center or emergency room at once.  NOTE: This medicine is only for you. Do not share this medicine with others.  What if I miss a dose?  If you miss a dose, take it as soon as you can. If it is almost time for your next dose, take only that dose. Do not take double or extra doses.  What may interact with this medicine?  This medicine may interact with the following medications:  alcohol  antihistamines for allergy, cough, and cold  certain medicines for anxiety or sleep  certain medicines for depression like amitriptyline, fluoxetine, sertraline  certain medicines for seizures like phenobarbital, primidone  certain medicines for stomach problems  general anesthetics like halothane, isoflurane, methoxyflurane, propofol  local anesthetics like lidocaine, pramoxine, tetracaine  medicines that relax muscles for surgery  narcotic medicines for pain  phenothiazines like chlorpromazine, mesoridazine, prochlorperazine, thioridazine  This list may not describe all possible interactions. Give your health care provider a list of all the medicines, herbs, non-prescription drugs, or dietary supplements you use. Also tell them if you smoke, drink alcohol, or use illegal drugs. Some items may interact with your medicine.  What should I watch for while using this medicine?  Visit your doctor or health care provider for regular checks on your progress. You may want to keep a record at home of how you feel your condition is responding to treatment. You may want to share this information with your doctor or  health care provider at each visit. You should contact your doctor or health care provider if your seizures get worse or if you have any new types of seizures. Do not stop taking this medicine or any of your seizure medicines unless instructed by your doctor or health care provider. Stopping your medicine suddenly can increase your seizures or their severity.  This medicine may cause serious skin reactions. They can happen weeks to months after starting the medicine. Contact your health care provider right away if you notice fevers or flu-like symptoms with a rash. The rash may be red or purple and then turn into blisters or peeling of the skin. Or, you might notice a red rash with swelling of the face, lips or lymph nodes in your neck or under your arms.  Wear a medical identification bracelet or chain if you are taking this medicine for seizures, and carry a card that lists all your medications.  You may get drowsy, dizzy, or have blurred vision. Do not drive, use machinery, or do anything that needs mental alertness until you know how this medicine affects you. To reduce dizzy or fainting spells, do not sit or stand up quickly, especially if you are an older patient. Alcohol can increase drowsiness and dizziness. Avoid alcoholic drinks.  Your mouth may get dry. Chewing sugarless gum or sucking hard candy, and drinking plenty of water will help.  The use of this medicine may increase the chance of suicidal thoughts or actions. Pay special attention to how you are responding while on this medicine. Any worsening of mood, or thoughts of suicide or dying should be reported to your health care provider right away.  Women who become pregnant while using this medicine may enroll in the [place] Antiepileptic Drug Pregnancy Registry by calling 1-716.292.5136. This registry collects information about the safety of antiepileptic drug use during pregnancy.  What side effects may I notice from receiving this  medicine?  Side effects that you should report to your doctor or health care professional as soon as possible:  allergic reactions like skin rash, itching or hives, swelling of the face, lips, or tongue  breathing problems  rash, fever, and swollen lymph nodes  redness, blistering, peeling or loosening of the skin, including inside the mouth  suicidal thoughts, mood changes  Side effects that usually do not require medical attention (report to your doctor or health care professional if they continue or are bothersome):  dizziness  drowsiness  headache  nausea, vomiting  swelling of ankles, feet, hands  tiredness  This list may not describe all possible side effects. Call your doctor for medical advice about side effects. You may report side effects to FDA at 0-416-FJK-1933.  Where should I keep my medicine?  Keep out of reach of children.  This medicine may cause accidental overdose and death if it taken by other adults, children, or pets. Mix any unused medicine with a substance like cat litter or coffee grounds. Then throw the medicine away in a sealed container like a sealed bag or a coffee can with a lid. Do not use the medicine after the expiration date.  Store at room temperature between 15 and 30 degrees C (59 and 86 degrees F).  NOTE: This sheet is a summary. It may not cover all possible information. If you have questions about this medicine, talk to your doctor, pharmacist, or health care provider.  © 2020 [employer] (2020-03-20 14:16:43)    Rosuvastatin Tablets  What is this medicine?  ROSUVASTATIN (zak SOFIYA va sta tin) is known as a HMG-CoA reductase inhibitor or 'statin'. It lowers cholesterol and triglycerides in the blood. This drug may also reduce the risk of heart attack, stroke, or other health problems in patients with risk factors for heart disease. Diet and lifestyle changes are often used with this drug.  This medicine may be used for other purposes; ask your health care provider or  pharmacist if you have questions.  COMMON BRAND NAME(S): Crestor  What should I tell my health care provider before I take this medicine?  They need to know if you have any of these conditions:  diabetes  if you often drink alcohol  history of stroke  kidney disease  liver disease  muscle aches or weakness  thyroid disease  an unusual or allergic reaction to rosuvastatin, other medicines, foods, dyes, or preservatives  pregnant or trying to get pregnant  breast-feeding  How should I use this medicine?  Take this medicine by mouth with a glass of water. Follow the directions on the prescription label. Do not cut, crush or chew this medicine. You can take this medicine with or without food. Take your doses at regular intervals. Do not take your medicine more often than directed.  Talk to your pediatrician regarding the use of this medicine in children. While this drug may be prescribed for children as young as 7 years old for selected conditions, precautions do apply.  Overdosage: If you think you have taken too much of this medicine contact a poison control center or emergency room at once.  NOTE: This medicine is only for you. Do not share this medicine with others.  What if I miss a dose?  If you miss a dose, take it as soon as you can. If your next dose is to be taken in less than 12 hours, then do not take the missed dose. Take the next dose at your regular time. Do not take double or extra doses.  What may interact with this medicine?  Do not take this medicine with any of the following medications:  herbal medicines like red yeast rice  This medicine may also interact with the following medications:  alcohol  antacids containing aluminum hydroxide or magnesium hydroxide  cyclosporine  other medicines for high cholesterol  some medicines for HIV infection  warfarin  This list may not describe all possible interactions. Give your health care provider a list of all the medicines, herbs, non-prescription drugs, or  dietary supplements you use. Also tell them if you smoke, drink alcohol, or use illegal drugs. Some items may interact with your medicine.  What should I watch for while using this medicine?  Visit your doctor or health care professional for regular check-ups. You may need regular tests to make sure your liver is working properly.  Your health care professional may tell you to stop taking this medicine if you develop muscle problems. If your muscle problems do not go away after stopping this medicine, contact your health care professional.  Do not become pregnant while taking this medicine. Women should inform their health care professional if they wish to become pregnant or think they might be pregnant. There is a potential for serious side effects to an unborn child. Talk to your health care professional or pharmacist for more information. Do not breast-feed an infant while taking this medicine.  This medicine may increase blood sugar. Ask your healthcare provider if changes in diet or medicines are needed if you have diabetes.  If you are going to need surgery or other procedure, tell your doctor that you are using this medicine.  This drug is only part of a total heart-health program. Your doctor or a dietician can suggest a low-cholesterol and low-fat diet to help. Avoid alcohol and smoking, and keep a proper exercise schedule.  This medicine may cause a decrease in Co-Enzyme Q-10. You should make sure that you get enough Co-Enzyme Q-10 while you are taking this medicine. Discuss the foods you eat and the vitamins you take with your health care professional.  What side effects may I notice from receiving this medicine?  Side effects that you should report to your doctor or health care professional as soon as possible:  allergic reactions like skin rash, itching or hives, swelling of the face, lips, or tongue  confusion  joint pain  loss of memory  redness, blistering, peeling or loosening of the skin, including  inside the mouth  signs and symptoms of high blood sugar such as being more thirsty or hungry or having to urinate more than normal. You may also feel very tired or have blurry vision.  signs and symptoms of muscle injury like dark urine; trouble passing urine or change in the amount of urine; unusually weak or tired; muscle pain or side or back pain  yellowing of the eyes or skin  Side effects that usually do not require medical attention (report to your doctor or health care professional if they continue or are bothersome):  constipation  diarrhea  dizziness  gas  headache  nausea  stomach pain  trouble sleeping  upset stomach  This list may not describe all possible side effects. Call your doctor for medical advice about side effects. You may report side effects to FDA at 6-179-SGA-1055.  Where should I keep my medicine?  Keep out of the reach of children.  Store at room temperature between 20 and 25 degrees C (68 and 77 degrees F). Keep container tightly closed (protect from moisture). Throw away any unused medicine after the expiration date.  NOTE: This sheet is a summary. It may not cover all possible information. If you have questions about this medicine, talk to your doctor, pharmacist, or health care provider.  © 2020 [employer]/Gold Standard (2019-10-10 08:25:08)    Tramadol tablets  What is this medicine?  TRAMADOL (TRA ma dole) is a pain reliever. It is used to treat moderate to severe pain in adults.  This medicine may be used for other purposes; ask your health care provider or pharmacist if you have questions.  COMMON BRAND NAME(S): Ultram  What should I tell my health care provider before I take this medicine?  They need to know if you have any of these conditions:  brain tumor  depression  drug abuse or addiction  head injury  if you frequently drink alcohol containing drinks  kidney disease or trouble passing urine  liver disease  lung disease, asthma, or breathing problems  seizures or  epilepsy  suicidal thoughts, plans, or attempt; a previous suicide attempt by you or a family member  an unusual or allergic reaction to tramadol, codeine, other medicines, foods, dyes, or preservatives  pregnant or trying to get pregnant  breast-feeding  How should I use this medicine?  Take this medicine by mouth with a full glass of water. Follow the directions on the prescription label. You can take it with or without food. If it upsets your stomach, take it with food. Do not take your medicine more often than directed.  A special MedGuide will be given to you by the pharmacist with each prescription and refill. Be sure to read this information carefully each time.  Talk to your pediatrician regarding the use of this medicine in children. Special care may be needed.  Overdosage: If you think you have taken too much of this medicine contact a poison control center or emergency room at once.  NOTE: This medicine is only for you. Do not share this medicine with others.  What if I miss a dose?  If you miss a dose, take it as soon as you can. If it is almost time for your next dose, take only that dose. Do not take double or extra doses.  What may interact with this medicine?  Do not take this medication with any of the following medicines:  MAOIs like Carbex, Eldepryl, Marplan, Nardil, and Parnate  This medicine may also interact with the following medications:  alcohol  antihistamines for allergy, cough and cold  certain medicines for anxiety or sleep  certain medicines for depression like amitriptyline, fluoxetine, sertraline  certain medicines for migraine headache like almotriptan, eletriptan, frovatriptan, naratriptan, rizatriptan, sumatriptan, zolmitriptan  certain medicines for seizures like carbamazepine, oxcarbazepine, phenobarbital, primidone  certain medicines that treat or prevent blood clots like warfarin  digoxin  furazolidone  general anesthetics like halothane, isoflurane, methoxyflurane,  propofol  linezolid  local anesthetics like lidocaine, pramoxine, tetracaine  medicines that relax muscles for surgery  other narcotic medicines for pain or cough  phenothiazines like chlorpromazine, mesoridazine, prochlorperazine, thioridazine  procarbazine  This list may not describe all possible interactions. Give your health care provider a list of all the medicines, herbs, non-prescription drugs, or dietary supplements you use. Also tell them if you smoke, drink alcohol, or use illegal drugs. Some items may interact with your medicine.  What should I watch for while using this medicine?  Tell your doctor or health care provider if your pain does not go away, if it gets worse, or if you have new or a different type of pain. You may develop tolerance to the medicine. Tolerance means that you will need a higher dose of the medicine for pain relief. Tolerance is normal and is expected if you take this medicine for a long time.  This medicine may cause serious skin reactions. They can happen weeks to months after starting the medicine. Contact your health care provider right away if you notice fevers or flu-like symptoms with a rash. The rash may be red or purple and then turn into blisters or peeling of the skin. Or, you might notice a red rash with swelling of the face, lips or lymph nodes in your neck or under your arms.  Do not suddenly stop taking your medicine because you may develop a severe reaction. Your body becomes used to the medicine. This does NOT mean you are addicted. Addiction is a behavior related to getting and using a drug for a non-medical reason. If you have pain, you have a medical reason to take pain medicine. Your doctor will tell you how much medicine to take. If your doctor wants you to stop the medicine, the dose will be slowly lowered over time to avoid any side effects.  There are different types of narcotic medicines (opiates). If you take more than one type at the same time or if you  are taking another medicine that also causes drowsiness, you may have more side effects. Give your health care provider a list of all medicines you use. Your doctor will tell you how much medicine to take. Do not take more medicine than directed. Call emergency for help if you have problems breathing or unusual sleepiness.  You may get drowsy or dizzy. Do not drive, use machinery, or do anything that needs mental alertness until you know how this medicine affects you. Do not stand or sit up quickly, especially if you are an older patient. This reduces the risk of dizzy or fainting spells. Alcohol can increase or decrease the effects of this medicine. Avoid alcoholic drinks.  You may have constipation. Try to have a bowel movement at least every 2 to 3 days. If you do not have a bowel movement for 3 days, call your doctor or health care provider.  Your mouth may get dry. Chewing sugarless gum or sucking hard candy, and drinking plenty of water may help. Contact your doctor if the problem does not go away or is severe.  What side effects may I notice from receiving this medicine?  Side effects that you should report to your doctor or health care professional as soon as possible:  allergic reactions like skin rash, itching or hives, swelling of the face, lips, or tongue  breathing problems  confusion  redness, blistering, peeling or loosening of the skin, including inside the mouth  seizures  signs and symptoms of low blood pressure like dizziness; feeling faint or lightheaded, falls; unusually weak or tired  trouble passing urine or change in the amount of urine  Side effects that usually do not require medical attention (report to your doctor or health care professional if they continue or are bothersome):  constipation  dry mouth  nausea, vomiting  tiredness  This list may not describe all possible side effects. Call your doctor for medical advice about side effects. You may report side effects to FDA at  1-800-FDA-1088.  Where should I keep my medicine?  Keep out of the reach of children.  This medicine may cause accidental overdose and death if it taken by other adults, children, or pets. Mix any unused medicine with a substance like cat litter or coffee grounds. Then throw the medicine away in a sealed container like a sealed bag or a coffee can with a lid. Do not use the medicine after the expiration date.  Store at room temperature between 15 and 30 degrees C (59 and 86 degrees F).  NOTE: This sheet is a summary. It may not cover all possible information. If you have questions about this medicine, talk to your doctor, pharmacist, or health care provider.  © 2020 [employer]/Gold Standard (2020-03-27 15:56:48)

## 2022-10-10 NOTE — PROGRESS NOTES
Received report from NOC shift RN. Patient is A&Ox4, no complaints of pain, VSS, no signs of distress. All questions and concerns answered, call light in reach, will continue to monitor.

## 2022-10-10 NOTE — PROGRESS NOTES
Hospital Medicine Daily Progress Note    Date of Service  10/10/2022    Chief Complaint  Clary Bah is a 56 y.o. female admitted 10/6/2022 with chest pain and uncontrolled blood pressure    Hospital Course  This is a 56-year-old female with past medical history of hypertension, palpitations on metoprolol, hypothyroidism and obesity with BMI of 40 who was admitted on 10/6/2022 with chest pain, significant for hypertensive urgency.    In the ER, she was noted to elevated blood pressures ranging from 170s to 240 systolic. She was given nitroglycerin sublingual and a nitroglycerin patch was applied with improvement in blood pressure.      UDS negative, EKG NSR. Troponin 12 on admit up peaked at 90, now downtrending 31.  Patient with heart score of 4.  ECHO noted LVEF 75%, mild LVH, distal septal hypokinesis.  Patient is for stress test tomorrow.     Interval Problem Update  Patient seen and examined, no overnight events, no nausea or vomiting, her troponin is up to 116 from 12 it was on admission    Her echo shows distal hypokinesis and also LVOT obstruction.  Cardiology was consulted and patient going for cardiac cath today appreciate rec.     I have discussed this patient's plan of care and discharge plan at IDT rounds today with Case Management, Nursing, Nursing leadership, and other members of the IDT team.    Consultants/Specialty  cardiology    Code Status  Full Code    Disposition  Patient is not medically cleared for discharge.   Anticipate discharge to to home with close outpatient follow-up.  I have placed the appropriate orders for post-discharge needs.    Review of Systems  Review of Systems   Cardiovascular:  Positive for chest pain.   All other systems reviewed and are negative.     Physical Exam  Temp:  [36.4 °C (97.5 °F)-37.3 °C (99.1 °F)] 36.6 °C (97.9 °F)  Pulse:  [] 91  Resp:  [16-18] 16  BP: (102-185)/() 114/66  SpO2:  [91 %-96 %] 91 %    Physical Exam  Vitals and nursing note  reviewed.   Constitutional:       General: She is not in acute distress.     Appearance: Normal appearance.   HENT:      Head: Normocephalic and atraumatic.      Mouth/Throat:      Mouth: Mucous membranes are moist.      Pharynx: No oropharyngeal exudate.   Eyes:      Extraocular Movements: Extraocular movements intact.      Pupils: Pupils are equal, round, and reactive to light.   Cardiovascular:      Rate and Rhythm: Normal rate and regular rhythm.      Pulses: Normal pulses.      Heart sounds: No murmur heard.    No friction rub. No gallop.   Pulmonary:      Effort: Pulmonary effort is normal. No respiratory distress.      Breath sounds: No wheezing, rhonchi or rales.   Abdominal:      General: Bowel sounds are normal. There is no distension.      Palpations: Abdomen is soft. There is no mass.      Tenderness: There is no abdominal tenderness.   Musculoskeletal:         General: No swelling or tenderness. Normal range of motion.      Cervical back: Normal range of motion. No rigidity. No muscular tenderness.      Right lower leg: No edema.      Left lower leg: No edema.   Skin:     General: Skin is warm and dry.      Capillary Refill: Capillary refill takes less than 2 seconds.      Findings: No erythema or rash.   Neurological:      General: No focal deficit present.      Mental Status: She is alert and oriented to person, place, and time.      Motor: No weakness.      Gait: Gait normal.       Fluids    Intake/Output Summary (Last 24 hours) at 10/10/2022 1157  Last data filed at 10/10/2022 1146  Gross per 24 hour   Intake 240 ml   Output 0 ml   Net 240 ml         Laboratory  Recent Labs     10/08/22  0309   WBC 8.8   RBC 4.90   HEMOGLOBIN 15.0   HEMATOCRIT 44.1   MCV 90.0   MCH 30.6   MCHC 34.0   RDW 45.1   PLATELETCT 310   MPV 10.3     Recent Labs     10/08/22  0309   SODIUM 136   POTASSIUM 4.1   CHLORIDE 105   CO2 18*   GLUCOSE 136*   BUN 13   CREATININE 0.61   CALCIUM 9.7     Recent Labs     10/08/22  0931  10/09/22  0939 10/09/22  2038   APTT 26.0 26.1 25.8   INR 1.03 1.00 1.03                 Imaging  EC-ECHOCARDIOGRAM COMPLETE W/ CONT   Final Result      DX-CHEST-PORTABLE (1 VIEW)   Final Result         1.  No acute cardiopulmonary disease.      CL-LEFT HEART CATHETERIZATION WITH POSSIBLE INTERVENTION    (Results Pending)        Assessment/Plan  * Hypertensive urgency- (present on admission)  Assessment & Plan  Presents with systolic -259  IV hydralazine 10 mg every 6 hours for SBP>180  Resume home ARB/HCTZ  Also on carvedilol        Hypokalemia  Assessment & Plan  Improved monitor and replete as needed     Pain in the chest  Assessment & Plan  Telemetry monitoring   ASA/Statin  Troponin now 116 echo showing distal septal hypokinesis and LVOT obstruction  Cardiology has been consulted, plan for cardiac cath tomorrow   Appreciate rec.       Hypothyroid- (present on admission)  Assessment & Plan  Resume levothyroxine 100 mcg daily     Essential hypertension- (present on admission)  Assessment & Plan  Resume Losartan/HCTZ in am   Hold BB for possible stress in am     Heart palpitations- (present on admission)  Assessment & Plan  Telemetry monitoring   Placed on metoprolol by her cardiologist. Will hold for now given possible stress test in am pending cardiology expert consultation.   Echo 2D        VTE prophylaxis: SCDs/TEDs and heparin ppx    I have performed a physical exam and reviewed and updated ROS and Plan today (10/09/2022). In review of yesterday's note (10/8/2022), there are no changes except as documented above.

## 2022-10-10 NOTE — PROGRESS NOTES
Hospital Medicine Daily Progress Note    Date of Service  10/10/2022    Chief Complaint  Clary Bah is a 56 y.o. female admitted 10/6/2022 with chest pain and uncontrolled blood pressure    Hospital Course  This is a 56-year-old female with past medical history of hypertension, palpitations on metoprolol, hypothyroidism and obesity with BMI of 40 who was admitted on 10/6/2022 with chest pain, significant for hypertensive urgency.    In the ER, she was noted to elevated blood pressures ranging from 170s to 240 systolic. She was given nitroglycerin sublingual and a nitroglycerin patch was applied with improvement in blood pressure.      UDS negative, EKG NSR. Troponin 12 on admit up peaked at 90, now downtrending 31.  Patient with heart score of 4.  ECHO noted LVEF 75%, mild LVH, distal septal hypokinesis.  Patient is for stress test tomorrow.     Interval Problem Update  Patient resting in bed  no overnight events, no nausea or vomiting, denies any chest pain, had cardiac cath done yesterday showing severe CAD so CT surgery was consulted by cardiology for CABG  Plan is for surgery on Wednesday   Appreciate rec     I have discussed this patient's plan of care and discharge plan at IDT rounds today with Case Management, Nursing, Nursing leadership, and other members of the IDT team.    Consultants/Specialty  cardiology    Code Status  Full Code    Disposition  Patient is not medically cleared for discharge.   Anticipate discharge to to home with close outpatient follow-up.  I have placed the appropriate orders for post-discharge needs.    Review of Systems  Review of Systems   Cardiovascular:  Positive for chest pain.   All other systems reviewed and are negative.     Physical Exam  Temp:  [36.4 °C (97.5 °F)-37.3 °C (99.1 °F)] 36.6 °C (97.9 °F)  Pulse:  [] 91  Resp:  [16-18] 16  BP: (106-185)/() 114/66  SpO2:  [91 %-96 %] 91 %    Physical Exam  Vitals and nursing note reviewed.   Constitutional:        General: She is not in acute distress.     Appearance: Normal appearance.   HENT:      Head: Normocephalic and atraumatic.      Mouth/Throat:      Mouth: Mucous membranes are moist.      Pharynx: No oropharyngeal exudate.   Eyes:      Extraocular Movements: Extraocular movements intact.      Pupils: Pupils are equal, round, and reactive to light.   Cardiovascular:      Rate and Rhythm: Normal rate and regular rhythm.      Pulses: Normal pulses.      Heart sounds: No murmur heard.    No friction rub. No gallop.   Pulmonary:      Effort: Pulmonary effort is normal. No respiratory distress.      Breath sounds: No wheezing, rhonchi or rales.   Abdominal:      General: Bowel sounds are normal. There is no distension.      Palpations: Abdomen is soft. There is no mass.      Tenderness: There is no abdominal tenderness.   Musculoskeletal:         General: No swelling or tenderness. Normal range of motion.      Cervical back: Normal range of motion. No rigidity. No muscular tenderness.      Right lower leg: No edema.      Left lower leg: No edema.   Skin:     General: Skin is warm and dry.      Capillary Refill: Capillary refill takes less than 2 seconds.      Findings: No erythema or rash.   Neurological:      General: No focal deficit present.      Mental Status: She is alert and oriented to person, place, and time.      Motor: No weakness.      Gait: Gait normal.       Fluids    Intake/Output Summary (Last 24 hours) at 10/10/2022 1211  Last data filed at 10/10/2022 1146  Gross per 24 hour   Intake 240 ml   Output 0 ml   Net 240 ml         Laboratory  Recent Labs     10/08/22  0309   WBC 8.8   RBC 4.90   HEMOGLOBIN 15.0   HEMATOCRIT 44.1   MCV 90.0   MCH 30.6   MCHC 34.0   RDW 45.1   PLATELETCT 310   MPV 10.3     Recent Labs     10/08/22  0309   SODIUM 136   POTASSIUM 4.1   CHLORIDE 105   CO2 18*   GLUCOSE 136*   BUN 13   CREATININE 0.61   CALCIUM 9.7     Recent Labs     10/08/22  0931 10/09/22  0939 10/09/22  2038   APTT  26.0 26.1 25.8   INR 1.03 1.00 1.03                 Imaging  EC-ECHOCARDIOGRAM COMPLETE W/ CONT   Final Result      DX-CHEST-PORTABLE (1 VIEW)   Final Result         1.  No acute cardiopulmonary disease.      CL-LEFT HEART CATHETERIZATION WITH POSSIBLE INTERVENTION    (Results Pending)   EC-ECHOCARDIOGRAM LTD W/ CONT    (Results Pending)        Assessment/Plan  * Pain in the chest  Assessment & Plan  Telemetry monitoring   ASA/Statin  Troponin now 116 echo showing distal septal hypokinesis and LVOT obstruction  Cardiology was consulted had cardiac cath done showing severe CAD. Ct surgery consulted and plan is for CABG on Wednesday     Hypokalemia  Assessment & Plan  Improved monitor and replete as needed     Hypertensive urgency- (present on admission)  Assessment & Plan  Presents with systolic -259  IV hydralazine 10 mg every 6 hours for SBP>180  Resume home ARB/HCTZ  Also on carvedilol        Hypothyroid- (present on admission)  Assessment & Plan  Resume levothyroxine 100 mcg daily     Essential hypertension- (present on admission)  Assessment & Plan  Resume Losartan/HCTZ in am   Hold BB for possible stress in am     Heart palpitations- (present on admission)  Assessment & Plan  Telemetry monitoring   Placed on metoprolol by her cardiologist. Will hold for now given possible stress test in am pending cardiology expert consultation.   Echo 2D        VTE prophylaxis: SCDs/TEDs and heparin ppx    I have performed a physical exam and reviewed and updated ROS and Plan today (10/10/2022). In review of yesterday's note (10/9/2022), there are no changes except as documented above.

## 2022-10-10 NOTE — PROGRESS NOTES
Confirmed with deyvi SCHMITTN that heparin gtt should be infusing. Heparin gtt not started after pt got back from heart cath on dayshift. New labs ordered. Confirmed with pharmacy to keep original initial bolus and initiation settings. Heparin gtt started per protocol. Pt educated on medication.

## 2022-10-10 NOTE — CARE PLAN
The patient is Stable - Low risk of patient condition declining or worsening    Shift Goals  Clinical Goals: CT surgery consult  Patient Goals: rest  Family Goals: n/a        Problem: Knowledge Deficit - Standard  Goal: Patient and family/care givers will demonstrate understanding of plan of care, disease process/condition, diagnostic tests and medications  Outcome: Not Progressing     Problem: Mobility  Goal: Patient's capacity to carry out activities will improve  Outcome: Not Progressing

## 2022-10-10 NOTE — CARE PLAN
The patient is Stable - Low risk of patient condition declining or worsening    Shift Goals  Clinical Goals: monitor VS, chest pain, nausea, hep gtt  Patient Goals: rest  Family Goals: n/a    Progress made toward(s) clinical / shift goals:      Problem: Knowledge Deficit - Standard  Goal: Patient and family/care givers will demonstrate understanding of plan of care, disease process/condition, diagnostic tests and medications  Outcome: Progressing     Problem: Pain - Standard  Goal: Alleviation of pain or a reduction in pain to the patient’s comfort goal  Outcome: Progressing     Problem: Hemodynamics  Goal: Patient's hemodynamics, fluid balance and neurologic status will be stable or improve  Outcome: Progressing       Patient is not progressing towards the following goals:

## 2022-10-10 NOTE — PROGRESS NOTES
Cardiology Progress Note    Name:   Clary Bah     YOB: 1965  Age:   56 y.o.  female   MRN:   [id number]    Attending Cardiologist: Dr. Quiroz  Outpatient Cardiologist: N/A    CC: Chest Pain (1-2 weeks mid-sternal pain, feels like indigestion, pain has been intermittent but tonight couldn't get pain to stop, denies radiation, deep breathing can make it feel better)       History of Present Illness  57yo female with hypertension, hyperlipidemia and CCS of 0 in 2020 presented with chest pressure, found to have NSTEMI and referred to cath lab which showed extensive disease in the LAD and ostial to mid RCA into posterior descending and posterior lateral branches.  She was referred to cardiothoracic surgery for consideration of bypass due to her elevated glucose readings and young age.    Interim Events   Clary has intermittent chest pressure is occurring last night when she was sleeping.  Lasts for several minutes and then resolves.  She has not required nitroglycerin.  The intensity is much less compared to her symptoms when she arrived.    Her father had coronary artery disease with several heart attacks starting in his 60s or 70s.  He ultimately  from complications of a stroke.  No other family history of coronary artery disease.      Review of Systems   Constitutional:  Negative for chills and fever.        No unexpected weight changes   Respiratory:  Negative for cough and shortness of breath.    Cardiovascular:  Positive for chest pain. Negative for palpitations, orthopnea, leg swelling and PND.   Gastrointestinal:  Negative for blood in stool, melena and nausea.   Neurological:  Negative for dizziness.     Medical History  Past Surgical History:   Procedure Laterality Date    RECTOCELE REPAIR  3/15/2011    Performed by MARK RAMÍREZ at SURGERY SAME DAY [facility]    BIOPSY GENERAL  10/20/08    Performed by MARK RAMÍREZ at SURGERY SAME DAY [facility]     "HYSTEROSCOPY WITH VIDEO OPERATIVE  10/20/08    Performed by MARK RAMÍREZ at SURGERY SAME DAY [facility]    GYN SURGERY          BRIANNE BY LAPAROSCOPY         Family History   Problem Relation Age of Onset    Arthritis Mother     Hypertension Mother     Heart Disease Father 67        heart attack    Diabetes Father     Hypertension Sister        Social History     Tobacco Use   Smoking Status Never   Smokeless Tobacco Never       Allergies   Allergen Reactions    Ace Inhibitors Cough    Atorvastatin Unspecified     Statin not indicted with coronary calcium score of 0.         Medications   Scheduled Medications   Medication Dose Frequency    insulin regular  1-6 Units 4X/DAY ACHS    carvedilol  25 mg BID WITH MEALS    rosuvastatin  20 mg Q EVENING    iohexol  1-300 mL Once    amLODIPine  10 mg Q DAY    losartan  100 mg Q DAY    aspirin  81 mg DAILY    levothyroxine  100 mcg AM ES           Physical Exam  /80   Pulse 96   Temp 36.8 °C (98.2 °F) (Temporal)   Resp 16   Ht 1.676 m (5' 6\")   Wt 103 kg (227 lb 15.3 oz)   SpO2 92%     Physical Exam  Vitals reviewed.   Constitutional:       Appearance: Normal appearance.   HENT:      Head: Normocephalic and atraumatic.   Cardiovascular:      Rate and Rhythm: Normal rate and regular rhythm.      Heart sounds: Murmur heard.   Pulmonary:      Effort: Pulmonary effort is normal.      Breath sounds: No wheezing or rales.   Abdominal:      General: There is no distension.   Musculoskeletal:      Right lower leg: No edema.   Skin:     General: Skin is warm and dry.   Neurological:      Mental Status: She is alert. Mental status is at baseline.   Psychiatric:         Judgment: Judgment normal.         Labs (personally reviewed):     Lab Results   Component Value Date/Time    SODIUM 136 10/08/2022 03:09 AM    POTASSIUM 4.1 10/08/2022 03:09 AM    CHLORIDE 105 10/08/2022 03:09 AM    CO2 18 (L) 10/08/2022 03:09 AM    GLUCOSE 136 (H) 10/08/2022 03:09 AM    " BUN 13 10/08/2022 03:09 AM    CREATININE 0.61 10/08/2022 03:09 AM    CREATININE 0.72 01/21/2011 10:15 AM    BUNCREATRAT 19 07/06/2018 07:52 AM    BUNCREATRAT 15 01/21/2011 10:15 AM    GLOMRATE >59 01/21/2011 10:15 AM     Lab Results   Component Value Date/Time    CHOLSTRLTOT 212 (H) 10/06/2022 09:27 PM     (H) 10/06/2022 09:27 PM    HDL 38 (A) 10/06/2022 09:27 PM    TRIGLYCERIDE 198 (H) 10/06/2022 09:27 PM     No results found for: BNPBTYPENAT      Cardiac Imaging and Procedures Review      Personal Telemetry Review  Sinus rhythm-sinus tachycardia  Personal EKG Interpretation  10/9/22 Sinus rhythm with possible infarct and abnormal t waves in anteroseptal leads  Echo 10/7/22  Hyperdynamic left ventricular systolic function.  The left ventricular ejection fraction is visually estimated to be   greater than 75%.  Distal septal hypokinesis.  Mild concentric left ventricular hypertrophy.  LVOT outflow tract obstruction noted 36 mmHg.  Mild mitral regurgitation.  Normal right ventricular size and systolic function.  Normal right ventricular size and systolic function.        Assessment and Medical Decision Making:  NSTEMI on admission  Multivessel CAD  Hyperlipidemia  -Continue aspirin 81 mg and unfractionated heparin drip.  Hold P2 Y 12 until he is evaluated by CT surgery  -Continue beta-blocker therapy (previously on metoprolol 100 at home)  -Continue high intensity statin  - CTS consultation      LVOT obstruction  -New diagnosis this admission.  She does not recall any presyncope or syncope.  She does have a history of paroxysmal SVT which has been treated with beta-blocker therapy.  -Continue blood pressure control, avoid hypotension or diuretics. Recheck limited echo with contrast for better visualization, cardiac MR is another option    Please see Dr Quiroz's attestation for additions and further recommendations.    Saima Santizo PA-C  [facility]    I personally  spent a total of 18 minutes which includes face-to-face time and non-face-to-face time spent on preparing to see the patient, reviewing hospital notes and tests, obtaining history from the patient, performing a medically appropriate exam, counseling and educating the patient, ordering medications/tests/procedures/referrals as clinically indicated, and documenting information in the electronic medical record.

## 2022-10-11 ENCOUNTER — APPOINTMENT (OUTPATIENT)
Dept: CARDIOLOGY | Facility: MEDICAL CENTER | Age: 57
DRG: 233 | End: 2022-10-11
Attending: PHYSICIAN ASSISTANT
Payer: COMMERCIAL

## 2022-10-11 ENCOUNTER — APPOINTMENT (OUTPATIENT)
Dept: RADIOLOGY | Facility: MEDICAL CENTER | Age: 57
DRG: 233 | End: 2022-10-11
Attending: CLINICAL NURSE SPECIALIST
Payer: COMMERCIAL

## 2022-10-11 PROBLEM — I95.9 HYPOTENSION: Status: ACTIVE | Noted: 2022-10-11

## 2022-10-11 LAB
ABO + RH BLD: NORMAL
ABO GROUP BLD: NORMAL
ALBUMIN SERPL BCP-MCNC: 3.2 G/DL (ref 3.2–4.9)
ALBUMIN/GLOB SERPL: 1 G/DL
ALP SERPL-CCNC: 99 U/L (ref 30–99)
ALT SERPL-CCNC: 48 U/L (ref 2–50)
ANION GAP SERPL CALC-SCNC: 12 MMOL/L (ref 7–16)
ANION GAP SERPL CALC-SCNC: 12 MMOL/L (ref 7–16)
APTT PPP: 53.2 SEC (ref 24.7–36)
AST SERPL-CCNC: 39 U/L (ref 12–45)
BASOPHILS # BLD AUTO: 0.5 % (ref 0–1.8)
BASOPHILS # BLD: 0.05 K/UL (ref 0–0.12)
BILIRUB SERPL-MCNC: 0.2 MG/DL (ref 0.1–1.5)
BLD GP AB SCN SERPL QL: NORMAL
BUN SERPL-MCNC: 17 MG/DL (ref 8–22)
BUN SERPL-MCNC: 18 MG/DL (ref 8–22)
CALCIUM SERPL-MCNC: 9.2 MG/DL (ref 8.5–10.5)
CALCIUM SERPL-MCNC: 9.5 MG/DL (ref 8.5–10.5)
CHLORIDE SERPL-SCNC: 101 MMOL/L (ref 96–112)
CHLORIDE SERPL-SCNC: 102 MMOL/L (ref 96–112)
CO2 SERPL-SCNC: 21 MMOL/L (ref 20–33)
CO2 SERPL-SCNC: 21 MMOL/L (ref 20–33)
CREAT SERPL-MCNC: 0.82 MG/DL (ref 0.5–1.4)
CREAT SERPL-MCNC: 0.9 MG/DL (ref 0.5–1.4)
EOSINOPHIL # BLD AUTO: 0.19 K/UL (ref 0–0.51)
EOSINOPHIL NFR BLD: 2 % (ref 0–6.9)
ERYTHROCYTE [DISTWIDTH] IN BLOOD BY AUTOMATED COUNT: 43.4 FL (ref 35.9–50)
ERYTHROCYTE [DISTWIDTH] IN BLOOD BY AUTOMATED COUNT: 44.3 FL (ref 35.9–50)
GFR SERPLBLD CREATININE-BSD FMLA CKD-EPI: 75 ML/MIN/1.73 M 2
GFR SERPLBLD CREATININE-BSD FMLA CKD-EPI: 84 ML/MIN/1.73 M 2
GLOBULIN SER CALC-MCNC: 3.3 G/DL (ref 1.9–3.5)
GLUCOSE BLD STRIP.AUTO-MCNC: 106 MG/DL (ref 65–99)
GLUCOSE BLD STRIP.AUTO-MCNC: 108 MG/DL (ref 65–99)
GLUCOSE BLD STRIP.AUTO-MCNC: 125 MG/DL (ref 65–99)
GLUCOSE BLD STRIP.AUTO-MCNC: 98 MG/DL (ref 65–99)
GLUCOSE SERPL-MCNC: 111 MG/DL (ref 65–99)
GLUCOSE SERPL-MCNC: 120 MG/DL (ref 65–99)
HCT VFR BLD AUTO: 37.2 % (ref 37–47)
HCT VFR BLD AUTO: 40.6 % (ref 37–47)
HGB BLD-MCNC: 12.7 G/DL (ref 12–16)
HGB BLD-MCNC: 14 G/DL (ref 12–16)
IMM GRANULOCYTES # BLD AUTO: 0.05 K/UL (ref 0–0.11)
IMM GRANULOCYTES NFR BLD AUTO: 0.5 % (ref 0–0.9)
INR PPP: 1.07 (ref 0.87–1.13)
LYMPHOCYTES # BLD AUTO: 1.94 K/UL (ref 1–4.8)
LYMPHOCYTES NFR BLD: 20.2 % (ref 22–41)
MCH RBC QN AUTO: 31 PG (ref 27–33)
MCH RBC QN AUTO: 31.1 PG (ref 27–33)
MCHC RBC AUTO-ENTMCNC: 34.1 G/DL (ref 33.6–35)
MCHC RBC AUTO-ENTMCNC: 34.5 G/DL (ref 33.6–35)
MCV RBC AUTO: 89.8 FL (ref 81.4–97.8)
MCV RBC AUTO: 91 FL (ref 81.4–97.8)
MONOCYTES # BLD AUTO: 1.12 K/UL (ref 0–0.85)
MONOCYTES NFR BLD AUTO: 11.7 % (ref 0–13.4)
NEUTROPHILS # BLD AUTO: 6.26 K/UL (ref 2–7.15)
NEUTROPHILS NFR BLD: 65.1 % (ref 44–72)
NRBC # BLD AUTO: 0 K/UL
NRBC BLD-RTO: 0 /100 WBC
PLATELET # BLD AUTO: 238 K/UL (ref 164–446)
PLATELET # BLD AUTO: 257 K/UL (ref 164–446)
PMV BLD AUTO: 10.1 FL (ref 9–12.9)
PMV BLD AUTO: 10.5 FL (ref 9–12.9)
POTASSIUM SERPL-SCNC: 3.8 MMOL/L (ref 3.6–5.5)
POTASSIUM SERPL-SCNC: 3.9 MMOL/L (ref 3.6–5.5)
PROT SERPL-MCNC: 6.5 G/DL (ref 6–8.2)
PROTHROMBIN TIME: 13.8 SEC (ref 12–14.6)
RBC # BLD AUTO: 4.09 M/UL (ref 4.2–5.4)
RBC # BLD AUTO: 4.52 M/UL (ref 4.2–5.4)
RH BLD: NORMAL
SODIUM SERPL-SCNC: 134 MMOL/L (ref 135–145)
SODIUM SERPL-SCNC: 135 MMOL/L (ref 135–145)
UFH PPP CHRO-ACNC: 0.35 IU/ML
WBC # BLD AUTO: 9.6 K/UL (ref 4.8–10.8)
WBC # BLD AUTO: 9.7 K/UL (ref 4.8–10.8)

## 2022-10-11 PROCEDURE — 700105 HCHG RX REV CODE 258: Performed by: INTERNAL MEDICINE

## 2022-10-11 PROCEDURE — 82962 GLUCOSE BLOOD TEST: CPT

## 2022-10-11 PROCEDURE — 86900 BLOOD TYPING SEROLOGIC ABO: CPT

## 2022-10-11 PROCEDURE — A9270 NON-COVERED ITEM OR SERVICE: HCPCS | Performed by: INTERNAL MEDICINE

## 2022-10-11 PROCEDURE — 80048 BASIC METABOLIC PNL TOTAL CA: CPT

## 2022-10-11 PROCEDURE — 700102 HCHG RX REV CODE 250 W/ 637 OVERRIDE(OP): Performed by: STUDENT IN AN ORGANIZED HEALTH CARE EDUCATION/TRAINING PROGRAM

## 2022-10-11 PROCEDURE — 81001 URINALYSIS AUTO W/SCOPE: CPT

## 2022-10-11 PROCEDURE — 700102 HCHG RX REV CODE 250 W/ 637 OVERRIDE(OP): Performed by: INTERNAL MEDICINE

## 2022-10-11 PROCEDURE — 86850 RBC ANTIBODY SCREEN: CPT

## 2022-10-11 PROCEDURE — 93880 EXTRACRANIAL BILAT STUDY: CPT

## 2022-10-11 PROCEDURE — 700117 HCHG RX CONTRAST REV CODE 255: Performed by: PHYSICIAN ASSISTANT

## 2022-10-11 PROCEDURE — A9270 NON-COVERED ITEM OR SERVICE: HCPCS | Performed by: STUDENT IN AN ORGANIZED HEALTH CARE EDUCATION/TRAINING PROGRAM

## 2022-10-11 PROCEDURE — 93308 TTE F-UP OR LMTD: CPT

## 2022-10-11 PROCEDURE — 99232 SBSQ HOSP IP/OBS MODERATE 35: CPT | Performed by: PHYSICIAN ASSISTANT

## 2022-10-11 PROCEDURE — 85610 PROTHROMBIN TIME: CPT

## 2022-10-11 PROCEDURE — 85520 HEPARIN ASSAY: CPT

## 2022-10-11 PROCEDURE — 93970 EXTREMITY STUDY: CPT

## 2022-10-11 PROCEDURE — 93005 ELECTROCARDIOGRAM TRACING: CPT | Performed by: CLINICAL NURSE SPECIALIST

## 2022-10-11 PROCEDURE — 99233 SBSQ HOSP IP/OBS HIGH 50: CPT | Performed by: INTERNAL MEDICINE

## 2022-10-11 PROCEDURE — 93325 DOPPLER ECHO COLOR FLOW MAPG: CPT | Mod: 26 | Performed by: INTERNAL MEDICINE

## 2022-10-11 PROCEDURE — 85025 COMPLETE CBC W/AUTO DIFF WBC: CPT

## 2022-10-11 PROCEDURE — 30233N1 TRANSFUSION OF NONAUTOLOGOUS RED BLOOD CELLS INTO PERIPHERAL VEIN, PERCUTANEOUS APPROACH: ICD-10-PCS | Performed by: CLINICAL NURSE SPECIALIST

## 2022-10-11 PROCEDURE — 86901 BLOOD TYPING SEROLOGIC RH(D): CPT

## 2022-10-11 PROCEDURE — 80053 COMPREHEN METABOLIC PANEL: CPT

## 2022-10-11 PROCEDURE — 770022 HCHG ROOM/CARE - ICU (200)

## 2022-10-11 PROCEDURE — 85730 THROMBOPLASTIN TIME PARTIAL: CPT

## 2022-10-11 PROCEDURE — 85027 COMPLETE CBC AUTOMATED: CPT

## 2022-10-11 PROCEDURE — 700102 HCHG RX REV CODE 250 W/ 637 OVERRIDE(OP): Performed by: GENERAL PRACTICE

## 2022-10-11 PROCEDURE — 700111 HCHG RX REV CODE 636 W/ 250 OVERRIDE (IP): Performed by: STUDENT IN AN ORGANIZED HEALTH CARE EDUCATION/TRAINING PROGRAM

## 2022-10-11 PROCEDURE — A9270 NON-COVERED ITEM OR SERVICE: HCPCS | Performed by: GENERAL PRACTICE

## 2022-10-11 PROCEDURE — 93308 TTE F-UP OR LMTD: CPT | Mod: 26 | Performed by: INTERNAL MEDICINE

## 2022-10-11 RX ORDER — SODIUM CHLORIDE 9 MG/ML
500 INJECTION, SOLUTION INTRAVENOUS ONCE
Status: COMPLETED | OUTPATIENT
Start: 2022-10-11 | End: 2022-10-11

## 2022-10-11 RX ORDER — ACETAMINOPHEN 500 MG
1000 TABLET ORAL ONCE
Status: COMPLETED | OUTPATIENT
Start: 2022-10-12 | End: 2022-10-12

## 2022-10-11 RX ORDER — SODIUM CHLORIDE 9 MG/ML
250 INJECTION, SOLUTION INTRAVENOUS ONCE
Status: COMPLETED | OUTPATIENT
Start: 2022-10-11 | End: 2022-10-11

## 2022-10-11 RX ORDER — ISOSORBIDE DINITRATE 10 MG/1
10 TABLET ORAL 3 TIMES DAILY
Status: DISCONTINUED | OUTPATIENT
Start: 2022-10-11 | End: 2022-10-11

## 2022-10-11 RX ADMIN — LOSARTAN POTASSIUM 100 MG: 50 TABLET, FILM COATED ORAL at 04:36

## 2022-10-11 RX ADMIN — SODIUM CHLORIDE 250 ML: 9 INJECTION, SOLUTION INTRAVENOUS at 12:10

## 2022-10-11 RX ADMIN — SODIUM CHLORIDE 500 ML: 9 INJECTION, SOLUTION INTRAVENOUS at 11:02

## 2022-10-11 RX ADMIN — HEPARIN SODIUM 14 UNITS/KG/HR: 5000 INJECTION, SOLUTION INTRAVENOUS at 22:35

## 2022-10-11 RX ADMIN — ASPIRIN 81 MG 81 MG: 81 TABLET ORAL at 04:36

## 2022-10-11 RX ADMIN — ISOSORBIDE DINITRATE 10 MG: 10 TABLET ORAL at 09:01

## 2022-10-11 RX ADMIN — CARVEDILOL 25 MG: 25 TABLET, FILM COATED ORAL at 09:01

## 2022-10-11 RX ADMIN — AMLODIPINE BESYLATE 10 MG: 10 TABLET ORAL at 04:36

## 2022-10-11 RX ADMIN — HUMAN ALBUMIN MICROSPHERES AND PERFLUTREN 3 ML: 10; .22 INJECTION, SOLUTION INTRAVENOUS at 17:30

## 2022-10-11 RX ADMIN — ROSUVASTATIN CALCIUM 20 MG: 20 TABLET, FILM COATED ORAL at 17:36

## 2022-10-11 RX ADMIN — LEVOTHYROXINE SODIUM 100 MCG: 0.1 TABLET ORAL at 04:36

## 2022-10-11 ASSESSMENT — ENCOUNTER SYMPTOMS
VOMITING: 0
BLOOD IN STOOL: 0
HEARTBURN: 0
PND: 0
NAUSEA: 0
SHORTNESS OF BREATH: 0
BACK PAIN: 0
PALPITATIONS: 0
COUGH: 0
CHILLS: 0
MYALGIAS: 0
FEVER: 0
BLURRED VISION: 0
NECK PAIN: 0
DOUBLE VISION: 0
ORTHOPNEA: 0

## 2022-10-11 ASSESSMENT — FIBROSIS 4 INDEX
FIB4 SCORE: 0.76
FIB4 SCORE: 1.2

## 2022-10-11 ASSESSMENT — PAIN DESCRIPTION - PAIN TYPE
TYPE: ACUTE PAIN
TYPE: ACUTE PAIN

## 2022-10-11 NOTE — PROGRESS NOTES
Pt reporting dizziness. Vitals machine in room unable to capture BP. Manual BP reading 88/54. Zoll reading 85/57. MD bedside. Pt reporting the dizziness comes and goes but started when cardiology came in and asked her to sit upright. Pt does not report any other symptoms and all other VSS. Bolus orders received.

## 2022-10-11 NOTE — PROGRESS NOTES
Cardiology Progress Note    Name:   Clary Bah     YOB: 1965  Age:   56 y.o.  female   MRN:   [id number]    Attending Cardiologist: Dr. Quiroz  Outpatient Cardiologist: N/A    CC: Chest Pain (1-2 weeks mid-sternal pain, feels like indigestion, pain has been intermittent but tonight couldn't get pain to stop, denies radiation, deep breathing can make it feel better)       History of Present Illness  57yo female with hypertension, hyperlipidemia and CCS of 0 in 2020 presented with chest pressure, found to have NSTEMI and referred to cath lab which showed extensive disease in the LAD and ostial to mid RCA into posterior descending and posterior lateral branches.  She was referred to cardiothoracic surgery for consideration of bypass due to her elevated glucose readings and young age.    Her father had coronary artery disease with several heart attacks starting in his 60s or 70s.  He ultimately  from complications of a stroke.  No other family history of coronary artery disease.    Interim Events   Clary continues to have intermittent chest pressure overnight while resting in bed.  Blood pressures are elevated.    Review of Systems   Constitutional:  Negative for chills and fever.        No unexpected weight changes   Respiratory:  Negative for cough and shortness of breath.    Cardiovascular:  Negative for chest pain, palpitations, orthopnea, leg swelling and PND.   Gastrointestinal:  Negative for blood in stool, melena and nausea.     Medical History  Past Surgical History:   Procedure Laterality Date    RECTOCELE REPAIR  3/15/2011    Performed by MARK RAMÍREZ at SURGERY SAME DAY [facility]    BIOPSY GENERAL  10/20/08    Performed by MARK RAMÍREZ at SURGERY SAME DAY [facility]    HYSTEROSCOPY WITH VIDEO OPERATIVE  10/20/08    Performed by MARK RAMÍREZ at SURGERY SAME DAY [facility]    GYN SURGERY          BRIANNE BY LAPAROSCOPY         Family  "History   Problem Relation Age of Onset    Arthritis Mother     Hypertension Mother     Heart Disease Father 67        heart attack    Diabetes Father     Hypertension Sister        Social History     Tobacco Use   Smoking Status Never   Smokeless Tobacco Never       Allergies   Allergen Reactions    Ace Inhibitors Cough    Atorvastatin Unspecified     Statin not indicted with coronary calcium score of 0.         Medications   Scheduled Medications   Medication Dose Frequency    isosorbide dinitrate  10 mg TID    NS  500 mL Once    insulin regular  1-6 Units 4X/DAY ACHS    carvedilol  25 mg BID WITH MEALS    rosuvastatin  20 mg Q EVENING    amLODIPine  10 mg Q DAY    losartan  100 mg Q DAY    aspirin  81 mg DAILY    levothyroxine  100 mcg AM ES           Physical Exam  BP (!) 85/57   Pulse 100   Temp 36.9 °C (98.4 °F) (Temporal)   Resp 16   Ht 1.676 m (5' 6\")   Wt 103 kg (227 lb 15.3 oz)   SpO2 90%     Physical Exam  Vitals reviewed.   Constitutional:       Appearance: Normal appearance.   HENT:      Head: Normocephalic and atraumatic.   Cardiovascular:      Rate and Rhythm: Normal rate and regular rhythm.      Heart sounds: Murmur heard.   Pulmonary:      Effort: Pulmonary effort is normal.      Breath sounds: No rhonchi or rales.   Abdominal:      General: There is no distension.   Musculoskeletal:      Right lower leg: No edema.      Left lower leg: No edema.   Skin:     General: Skin is warm and dry.   Neurological:      Mental Status: She is alert.   Psychiatric:         Judgment: Judgment normal.         Labs (personally reviewed):     Lab Results   Component Value Date/Time    SODIUM 134 (L) 10/11/2022 12:32 AM    POTASSIUM 3.8 10/11/2022 12:32 AM    CHLORIDE 101 10/11/2022 12:32 AM    CO2 21 10/11/2022 12:32 AM    GLUCOSE 111 (H) 10/11/2022 12:32 AM    BUN 17 10/11/2022 12:32 AM    CREATININE 0.82 10/11/2022 12:32 AM    CREATININE 0.72 01/21/2011 10:15 AM    BUNCREATRAT 19 07/06/2018 07:52 AM    " BUNCREATRAT 15 01/21/2011 10:15 AM    GLOMRATE >59 01/21/2011 10:15 AM     Lab Results   Component Value Date/Time    CHOLSTRLTOT 212 (H) 10/06/2022 09:27 PM     (H) 10/06/2022 09:27 PM    HDL 38 (A) 10/06/2022 09:27 PM    TRIGLYCERIDE 198 (H) 10/06/2022 09:27 PM     No results found for: BNPBTYPENAT      Cardiac Imaging and Procedures Review      Personal Telemetry Review  Sinus rhythm-sinus tachycardia  Personal EKG Interpretation  10/9/22 Sinus rhythm with possible infarct and abnormal t waves in anteroseptal leads  Echo 10/7/22  Hyperdynamic left ventricular systolic function.  The left ventricular ejection fraction is visually estimated to be   greater than 75%.  Distal septal hypokinesis.  Mild concentric left ventricular hypertrophy.  LVOT outflow tract obstruction noted 36 mmHg.  Mild mitral regurgitation.  Normal right ventricular size and systolic function.  Normal right ventricular size and systolic function.        Assessment and Medical Decision Making:  NSTEMI on admission  Multivessel CAD  Hyperlipidemia  -Continue aspirin 81 mg and unfractionated heparin drip.  NO P2 Y 12 inhibitor until after surgery  -Continue beta-blocker therapy, do not up titrate before surgery (previously on metoprolol 100 at home)  -Continue high intensity statin  -Isordil added for improved blood pressure control and ongoing angina  - CTS recommends bypass for revascularization, tentatively planned for tomorrow morning     LVOT obstruction  -New diagnosis this admission.  She does not recall any presyncope or syncope. Recheck limited echo with contrast for better visualization, cardiac MR is another option  -Continue blood pressure control, avoid hypotension and dehydration    Please see Dr Quiroz's attestation for additions and further recommendations.    Saima Santizo PA-C  [facility]    I personally spent a total of 26 minutes which includes face-to-face time and  non-face-to-face time spent on preparing to see the patient, reviewing hospital notes and tests, obtaining history from the patient, performing a medically appropriate exam, counseling and educating the patient, ordering medications/tests/procedures/referrals as clinically indicated, and documenting information in the electronic medical record.

## 2022-10-11 NOTE — CARE PLAN
The patient is Stable - Low risk of patient condition declining or worsening    Shift Goals  Clinical Goals: rest, safety  Patient Goals: rest  Family Goals: n/a    Progress made toward(s) clinical / shift goals:      Problem: Knowledge Deficit - Standard  Goal: Patient and family/care givers will demonstrate understanding of plan of care, disease process/condition, diagnostic tests and medications  Outcome: Progressing     Problem: Pain - Standard  Goal: Alleviation of pain or a reduction in pain to the patient’s comfort goal  Outcome: Progressing     Problem: Hemodynamics  Goal: Patient's hemodynamics, fluid balance and neurologic status will be stable or improve  Outcome: Progressing     Problem: Respiratory  Goal: Patient will achieve/maintain optimum respiratory ventilation and gas exchange  Outcome: Progressing     Problem: Mobility  Goal: Patient's capacity to carry out activities will improve  Outcome: Progressing     Problem: Self Care  Goal: Patient will have the ability to perform ADLs independently or with assistance (bathe, groom, dress, toilet and feed)  Outcome: Progressing       Patient is not progressing towards the following goals:

## 2022-10-11 NOTE — PROGRESS NOTES
Bedside report received and patient care assumed. Pt is resting in bed, A&O4, with no complaints of pain, and is on RA. Tele box on. All fall precautions are in place, belongings at bedside table.  Pt was updated on POC, no questions or concerns. Pt educated on use of call light for assistance.

## 2022-10-11 NOTE — PROGRESS NOTES
Hospital Medicine Daily Progress Note    Date of Service  10/11/2022    Chief Complaint  Clary Bah is a 56 y.o. female admitted 10/6/2022 with chest pain and uncontrolled blood pressure    Hospital Course  This is a 56-year-old female with past medical history of hypertension, palpitations on metoprolol, hypothyroidism and obesity with BMI of 40 who was admitted on 10/6/2022 with chest pain, significant for hypertensive urgency.    In the ER, she was noted to elevated blood pressures ranging from 170s to 240 systolic. She was given nitroglycerin sublingual and a nitroglycerin patch was applied with improvement in blood pressure.      UDS negative, EKG NSR. Troponin 12 on admit up peaked at 90, now downtrending 31.  Patient with heart score of 4.  ECHO noted LVEF 75%, mild LVH, distal septal hypokinesis.  Patient is for stress test tomorrow.     Interval Problem Update  Patient seen and examined afebrile,  no overnight events, no nausea or vomiting, denies any chest adri  She was hypotensive this AM requiring IV fluid bolus   Holding some of her BP medication  Cardiology following and plan is for surgery on Wednesday appreciate rec,       I have discussed this patient's plan of care and discharge plan at IDT rounds today with Case Management, Nursing, Nursing leadership, and other members of the IDT team.    Consultants/Specialty  cardiology    Code Status  Full Code    Disposition  Patient is not medically cleared for discharge.   Anticipate discharge to to home with close outpatient follow-up.  I have placed the appropriate orders for post-discharge needs.    Review of Systems  Review of Systems   Constitutional:  Negative for fever.   Eyes:  Negative for blurred vision and double vision.   Respiratory:  Negative for cough and shortness of breath.    Cardiovascular:  Positive for chest pain.   Gastrointestinal:  Negative for heartburn, nausea and vomiting.   Genitourinary:  Negative for dysuria and urgency.    Musculoskeletal:  Negative for back pain, myalgias and neck pain.   All other systems reviewed and are negative.     Physical Exam  Temp:  [36.7 °C (98.1 °F)-37.3 °C (99.1 °F)] 36.7 °C (98.1 °F)  Pulse:  [] 87  Resp:  [16-17] 17  BP: ()/(56-94) 96/60  SpO2:  [90 %-95 %] 90 %    Physical Exam  Vitals and nursing note reviewed.   Constitutional:       General: She is not in acute distress.     Appearance: Normal appearance.   HENT:      Head: Normocephalic and atraumatic.      Mouth/Throat:      Mouth: Mucous membranes are moist.      Pharynx: No oropharyngeal exudate.   Eyes:      Extraocular Movements: Extraocular movements intact.      Pupils: Pupils are equal, round, and reactive to light.   Cardiovascular:      Rate and Rhythm: Normal rate and regular rhythm.      Pulses: Normal pulses.      Heart sounds: No murmur heard.    No friction rub. No gallop.   Pulmonary:      Effort: Pulmonary effort is normal. No respiratory distress.      Breath sounds: No wheezing, rhonchi or rales.   Abdominal:      General: Bowel sounds are normal. There is no distension.      Palpations: Abdomen is soft. There is no mass.      Tenderness: There is no abdominal tenderness.   Musculoskeletal:         General: No swelling or tenderness. Normal range of motion.      Cervical back: Normal range of motion. No rigidity. No muscular tenderness.      Right lower leg: No edema.      Left lower leg: No edema.   Skin:     General: Skin is warm and dry.      Capillary Refill: Capillary refill takes less than 2 seconds.      Findings: No erythema or rash.   Neurological:      General: No focal deficit present.      Mental Status: She is alert and oriented to person, place, and time.      Motor: No weakness.      Gait: Gait normal.       Fluids    Intake/Output Summary (Last 24 hours) at 10/11/2022 1325  Last data filed at 10/11/2022 1102  Gross per 24 hour   Intake 920 ml   Output --   Net 920 ml         Laboratory  Recent Labs      10/11/22  0032   WBC 9.7   RBC 4.52   HEMOGLOBIN 14.0   HEMATOCRIT 40.6   MCV 89.8   MCH 31.0   MCHC 34.5   RDW 43.4   PLATELETCT 238   MPV 10.1     Recent Labs     10/11/22  0032   SODIUM 134*   POTASSIUM 3.8   CHLORIDE 101   CO2 21   GLUCOSE 111*   BUN 17   CREATININE 0.82   CALCIUM 9.5     Recent Labs     10/09/22  0939 10/09/22  2038   APTT 26.1 25.8   INR 1.00 1.03                 Imaging  US-CAROTID DOPPLER BILAT   Final Result      US-VEIN MAPPING LOWER EXTREMITY BILAT   Final Result      EC-ECHOCARDIOGRAM COMPLETE W/ CONT   Final Result      DX-CHEST-PORTABLE (1 VIEW)   Final Result         1.  No acute cardiopulmonary disease.      CL-LEFT HEART CATHETERIZATION WITH POSSIBLE INTERVENTION    (Results Pending)   EC-ECHOCARDIOGRAM LTD W/ CONT    (Results Pending)        Assessment/Plan  * Pain in the chest  Assessment & Plan  Telemetry monitoring   ASA/Statin  Troponin now 116 echo showing distal septal hypokinesis and LVOT obstruction  Cardiology was consulted had cardiac cath done showing severe CAD. Ct surgery consulted and plan is for CABG on Wednesday     Hypotension  Assessment & Plan  Hypotensive this mornign requiring IV fluid bolus improving  Holding some of her BP meds     Hypokalemia  Assessment & Plan  Improved monitor and replete as needed     Hypertensive urgency- (present on admission)  Assessment & Plan  Presents with systolic -259  IV hydralazine 10 mg every 6 hours for SBP>180  Resume home ARB/HCTZ  Also on carvedilol        Hypothyroid- (present on admission)  Assessment & Plan  Resume levothyroxine 100 mcg daily     Essential hypertension- (present on admission)  Assessment & Plan  Resume Losartan/HCTZ in am   Hold BB for possible stress in am     Heart palpitations- (present on admission)  Assessment & Plan  Telemetry monitoring   Placed on metoprolol by her cardiologist. Will hold for now given possible stress test in am pending cardiology expert consultation.   Echo 2D         VTE prophylaxis: SCDs/TEDs and heparin ppx    I have performed a physical exam and reviewed and updated ROS and Plan today (10/11/2022). In review of yesterday's note (10/10/2022), there are no changes except as documented above.

## 2022-10-12 ENCOUNTER — ANESTHESIA (OUTPATIENT)
Dept: SURGERY | Facility: MEDICAL CENTER | Age: 57
DRG: 233 | End: 2022-10-12
Payer: COMMERCIAL

## 2022-10-12 ENCOUNTER — ANESTHESIA EVENT (OUTPATIENT)
Dept: SURGERY | Facility: MEDICAL CENTER | Age: 57
DRG: 233 | End: 2022-10-12
Payer: COMMERCIAL

## 2022-10-12 ENCOUNTER — APPOINTMENT (OUTPATIENT)
Dept: CARDIOLOGY | Facility: MEDICAL CENTER | Age: 57
DRG: 233 | End: 2022-10-12
Attending: THORACIC SURGERY (CARDIOTHORACIC VASCULAR SURGERY)
Payer: COMMERCIAL

## 2022-10-12 ENCOUNTER — APPOINTMENT (OUTPATIENT)
Dept: RADIOLOGY | Facility: MEDICAL CENTER | Age: 57
DRG: 233 | End: 2022-10-12
Attending: THORACIC SURGERY (CARDIOTHORACIC VASCULAR SURGERY)
Payer: COMMERCIAL

## 2022-10-12 ENCOUNTER — APPOINTMENT (OUTPATIENT)
Dept: CARDIOLOGY | Facility: MEDICAL CENTER | Age: 57
DRG: 233 | End: 2022-10-12
Attending: NURSE PRACTITIONER
Payer: COMMERCIAL

## 2022-10-12 PROBLEM — I21.4 NSTEMI (NON-ST ELEVATED MYOCARDIAL INFARCTION) (HCC): Status: ACTIVE | Noted: 2022-10-12

## 2022-10-12 PROBLEM — Z95.1 S/P CABG X 2: Status: ACTIVE | Noted: 2022-10-12

## 2022-10-12 PROBLEM — Z01.818 ENCOUNTER FOR INTUBATION: Status: RESOLVED | Noted: 2022-10-12 | Resolved: 2022-10-12

## 2022-10-12 PROBLEM — Z01.818 ENCOUNTER FOR INTUBATION: Status: ACTIVE | Noted: 2022-10-12

## 2022-10-12 LAB
ACT BLD: 127 SEC (ref 74–137)
ACT BLD: 127 SEC (ref 74–137)
ACT BLD: 526 SEC (ref 74–137)
ACT BLD: 602 SEC (ref 74–137)
ACT BLD: 607 SEC (ref 74–137)
ACT BLD: 613 SEC (ref 74–137)
APPEARANCE UR: CLEAR
APTT PPP: 32.6 SEC (ref 24.7–36)
BACTERIA #/AREA URNS HPF: ABNORMAL /HPF
BASE EXCESS BLDA CALC-SCNC: -1 MMOL/L (ref -4–3)
BASE EXCESS BLDA CALC-SCNC: -2 MMOL/L (ref -4–3)
BASE EXCESS BLDA CALC-SCNC: -3 MMOL/L (ref -4–3)
BASE EXCESS BLDA CALC-SCNC: 1 MMOL/L (ref -4–3)
BASE EXCESS BLDA CALC-SCNC: 2 MMOL/L (ref -4–3)
BASE EXCESS BLDV CALC-SCNC: 0 MMOL/L (ref -4–3)
BILIRUB UR QL STRIP.AUTO: NEGATIVE
BODY TEMPERATURE: ABNORMAL DEGREES
BODY TEMPERATURE: NORMAL DEGREES
CA-I BLD ISE-SCNC: 0.97 MMOL/L (ref 1.1–1.3)
CA-I BLD ISE-SCNC: 1.01 MMOL/L (ref 1.1–1.3)
CA-I BLD ISE-SCNC: 1.06 MMOL/L (ref 1.1–1.3)
CA-I BLD ISE-SCNC: 1.25 MMOL/L (ref 1.1–1.3)
CA-I BLD ISE-SCNC: 1.32 MMOL/L (ref 1.1–1.3)
CA-I BLD ISE-SCNC: 1.39 MMOL/L (ref 1.1–1.3)
CA-I BLD ISE-SCNC: 1.44 MMOL/L (ref 1.1–1.3)
CO2 BLDA-SCNC: 21 MMOL/L (ref 20–33)
CO2 BLDA-SCNC: 22 MMOL/L (ref 20–33)
CO2 BLDA-SCNC: 23 MMOL/L (ref 20–33)
CO2 BLDA-SCNC: 25 MMOL/L (ref 20–33)
CO2 BLDA-SCNC: 27 MMOL/L (ref 20–33)
CO2 BLDA-SCNC: 27 MMOL/L (ref 20–33)
CO2 BLDV-SCNC: 25 MMOL/L (ref 20–33)
COLOR UR: YELLOW
DELSYS IDSYS: ABNORMAL
DELSYS IDSYS: NORMAL
EKG IMPRESSION: NORMAL
EPI CELLS #/AREA URNS HPF: ABNORMAL /HPF
GLUCOSE BLD STRIP.AUTO-MCNC: 105 MG/DL (ref 65–99)
GLUCOSE BLD STRIP.AUTO-MCNC: 105 MG/DL (ref 65–99)
GLUCOSE BLD STRIP.AUTO-MCNC: 110 MG/DL (ref 65–99)
GLUCOSE BLD STRIP.AUTO-MCNC: 113 MG/DL (ref 65–99)
GLUCOSE BLD STRIP.AUTO-MCNC: 113 MG/DL (ref 65–99)
GLUCOSE BLD STRIP.AUTO-MCNC: 117 MG/DL (ref 65–99)
GLUCOSE BLD STRIP.AUTO-MCNC: 118 MG/DL (ref 65–99)
GLUCOSE BLD STRIP.AUTO-MCNC: 118 MG/DL (ref 65–99)
GLUCOSE BLD STRIP.AUTO-MCNC: 126 MG/DL (ref 65–99)
GLUCOSE BLD STRIP.AUTO-MCNC: 130 MG/DL (ref 65–99)
GLUCOSE BLD STRIP.AUTO-MCNC: 131 MG/DL (ref 65–99)
GLUCOSE BLD STRIP.AUTO-MCNC: 152 MG/DL (ref 65–99)
GLUCOSE UR STRIP.AUTO-MCNC: NEGATIVE MG/DL
HCO3 BLDA-SCNC: 20.5 MMOL/L (ref 17–25)
HCO3 BLDA-SCNC: 21.1 MMOL/L (ref 17–25)
HCO3 BLDA-SCNC: 22.1 MMOL/L (ref 17–25)
HCO3 BLDA-SCNC: 22.1 MMOL/L (ref 17–25)
HCO3 BLDA-SCNC: 22.2 MMOL/L (ref 17–25)
HCO3 BLDA-SCNC: 24.1 MMOL/L (ref 17–25)
HCO3 BLDA-SCNC: 25.9 MMOL/L (ref 17–25)
HCO3 BLDA-SCNC: 26.2 MMOL/L (ref 17–25)
HCO3 BLDV-SCNC: 24.2 MMOL/L (ref 24–28)
HCT VFR BLD CALC: 21 % (ref 37–47)
HCT VFR BLD CALC: 24 % (ref 37–47)
HCT VFR BLD CALC: 24 % (ref 37–47)
HCT VFR BLD CALC: 25 % (ref 37–47)
HCT VFR BLD CALC: 28 % (ref 37–47)
HCT VFR BLD CALC: 33 % (ref 37–47)
HCT VFR BLD CALC: 35 % (ref 37–47)
HGB BLD-MCNC: 11.2 G/DL (ref 12–16)
HGB BLD-MCNC: 11.9 G/DL (ref 12–16)
HGB BLD-MCNC: 7.1 G/DL (ref 12–16)
HGB BLD-MCNC: 8.2 G/DL (ref 12–16)
HGB BLD-MCNC: 8.2 G/DL (ref 12–16)
HGB BLD-MCNC: 8.5 G/DL (ref 12–16)
HGB BLD-MCNC: 9.5 G/DL (ref 12–16)
HOROWITZ INDEX BLDA+IHG-RTO: 103 MM[HG]
HOROWITZ INDEX BLDA+IHG-RTO: 105 MM[HG]
HOROWITZ INDEX BLDA+IHG-RTO: 94 MM[HG]
HYALINE CASTS #/AREA URNS LPF: ABNORMAL /LPF
INR PPP: 1.28 (ref 0.87–1.13)
KETONES UR STRIP.AUTO-MCNC: NEGATIVE MG/DL
LEUKOCYTE ESTERASE UR QL STRIP.AUTO: ABNORMAL
MAGNESIUM SERPL-MCNC: 2.8 MG/DL (ref 1.5–2.5)
MICRO URNS: ABNORMAL
MODE IMODE: ABNORMAL
MODE IMODE: NORMAL
NITRITE UR QL STRIP.AUTO: NEGATIVE
O2/TOTAL GAS SETTING VFR VENT: 100 %
O2/TOTAL GAS SETTING VFR VENT: 60 %
O2/TOTAL GAS SETTING VFR VENT: 80 %
PCO2 BLDA: 29.1 MMHG (ref 26–37)
PCO2 BLDA: 32 MMHG (ref 26–37)
PCO2 BLDA: 34 MMHG (ref 26–37)
PCO2 BLDA: 37.8 MMHG (ref 26–37)
PCO2 BLDA: 39.6 MMHG (ref 26–37)
PCO2 BLDA: 39.7 MMHG (ref 26–37)
PCO2 BLDA: 40.3 MMHG (ref 26–37)
PCO2 BLDA: 41.4 MMHG (ref 26–37)
PCO2 BLDV: 38.9 MMHG (ref 41–51)
PCO2 TEMP ADJ BLDA: 27.6 MMHG (ref 26–37)
PCO2 TEMP ADJ BLDA: 30.5 MMHG (ref 26–37)
PCO2 TEMP ADJ BLDA: 32.8 MMHG (ref 26–37)
PCO2 TEMP ADJ BLDA: 36.3 MMHG (ref 26–37)
PCO2 TEMP ADJ BLDA: 37.8 MMHG (ref 26–37)
PCO2 TEMP ADJ BLDA: 38.5 MMHG (ref 26–37)
PCO2 TEMP ADJ BLDA: 39.8 MMHG (ref 26–37)
PCO2 TEMP ADJ BLDA: 40.3 MMHG (ref 26–37)
PCO2 TEMP ADJ BLDV: 36 MMHG (ref 41–51)
PEEP END EXPIRATORY PRESSURE IPEEP: 8 CMH20
PEEP END EXPIRATORY PRESSURE IPEEP: 8 CMH20
PERCENT MINUTE VOLUME IPMV: 130
PERCENT MINUTE VOLUME IPMV: 160
PH BLDA: 7.35 [PH] (ref 7.4–7.5)
PH BLDA: 7.38 [PH] (ref 7.4–7.5)
PH BLDA: 7.38 [PH] (ref 7.4–7.5)
PH BLDA: 7.4 [PH] (ref 7.4–7.5)
PH BLDA: 7.42 [PH] (ref 7.4–7.5)
PH BLDA: 7.43 [PH] (ref 7.4–7.5)
PH BLDA: 7.43 [PH] (ref 7.4–7.5)
PH BLDA: 7.46 [PH] (ref 7.4–7.5)
PH BLDV: 7.4 [PH] (ref 7.31–7.45)
PH TEMP ADJ BLDA: 7.36 [PH] (ref 7.4–7.5)
PH TEMP ADJ BLDA: 7.38 [PH] (ref 7.4–7.5)
PH TEMP ADJ BLDA: 7.38 [PH] (ref 7.4–7.5)
PH TEMP ADJ BLDA: 7.42 [PH] (ref 7.4–7.5)
PH TEMP ADJ BLDA: 7.43 [PH] (ref 7.4–7.5)
PH TEMP ADJ BLDA: 7.44 [PH] (ref 7.4–7.5)
PH TEMP ADJ BLDA: 7.46 [PH] (ref 7.4–7.5)
PH TEMP ADJ BLDA: 7.47 [PH] (ref 7.4–7.5)
PH TEMP ADJ BLDV: 7.43 [PH] (ref 7.31–7.45)
PH UR STRIP.AUTO: 6 [PH] (ref 5–8)
PLATELET # BLD AUTO: 165 K/UL (ref 164–446)
PO2 BLDA: 116 MMHG (ref 64–87)
PO2 BLDA: 208 MMHG (ref 64–87)
PO2 BLDA: 328 MMHG (ref 64–87)
PO2 BLDA: 330 MMHG (ref 64–87)
PO2 BLDA: 368 MMHG (ref 64–87)
PO2 BLDA: 62 MMHG (ref 64–87)
PO2 BLDA: 84 MMHG (ref 64–87)
PO2 BLDA: 94 MMHG (ref 64–87)
PO2 BLDV: 44 MMHG (ref 25–40)
PO2 TEMP ADJ BLDA: 116 MMHG (ref 64–87)
PO2 TEMP ADJ BLDA: 205 MMHG (ref 64–87)
PO2 TEMP ADJ BLDA: 324 MMHG (ref 64–87)
PO2 TEMP ADJ BLDA: 330 MMHG (ref 64–87)
PO2 TEMP ADJ BLDA: 358 MMHG (ref 64–87)
PO2 TEMP ADJ BLDA: 57 MMHG (ref 64–87)
PO2 TEMP ADJ BLDA: 78 MMHG (ref 64–87)
PO2 TEMP ADJ BLDA: 89 MMHG (ref 64–87)
PO2 TEMP ADJ BLDV: 39 MMHG (ref 25–40)
POTASSIUM BLD-SCNC: 4.1 MMOL/L (ref 3.6–5.5)
POTASSIUM BLD-SCNC: 4.4 MMOL/L (ref 3.6–5.5)
POTASSIUM BLD-SCNC: 4.5 MMOL/L (ref 3.6–5.5)
POTASSIUM BLD-SCNC: 4.7 MMOL/L (ref 3.6–5.5)
POTASSIUM BLD-SCNC: 4.7 MMOL/L (ref 3.6–5.5)
POTASSIUM BLD-SCNC: 4.9 MMOL/L (ref 3.6–5.5)
POTASSIUM BLD-SCNC: 5 MMOL/L (ref 3.6–5.5)
POTASSIUM SERPL-SCNC: 4.6 MMOL/L (ref 3.6–5.5)
POTASSIUM SERPL-SCNC: 4.7 MMOL/L (ref 3.6–5.5)
PROT UR QL STRIP: NEGATIVE MG/DL
PROTHROMBIN TIME: 15.8 SEC (ref 12–14.6)
RBC # URNS HPF: ABNORMAL /HPF
RBC UR QL AUTO: NEGATIVE
SAO2 % BLDA: 100 % (ref 93–99)
SAO2 % BLDA: 93 % (ref 93–99)
SAO2 % BLDA: 97 % (ref 93–99)
SAO2 % BLDA: 98 % (ref 93–99)
SAO2 % BLDA: 98 % (ref 93–99)
SAO2 % BLDV: 80 %
SODIUM BLD-SCNC: 135 MMOL/L (ref 135–145)
SODIUM BLD-SCNC: 135 MMOL/L (ref 135–145)
SODIUM BLD-SCNC: 136 MMOL/L (ref 135–145)
SODIUM BLD-SCNC: 136 MMOL/L (ref 135–145)
SODIUM BLD-SCNC: 137 MMOL/L (ref 135–145)
SODIUM BLD-SCNC: 137 MMOL/L (ref 135–145)
SODIUM BLD-SCNC: 138 MMOL/L (ref 135–145)
SP GR UR STRIP.AUTO: 1.01
SPECIMEN DRAWN FROM PATIENT: ABNORMAL
SPECIMEN DRAWN FROM PATIENT: NORMAL
UFH PPP CHRO-ACNC: 0.17 IU/ML
UROBILINOGEN UR STRIP.AUTO-MCNC: 0.2 MG/DL
WBC #/AREA URNS HPF: ABNORMAL /HPF

## 2022-10-12 PROCEDURE — 33517 CABG ARTERY-VEIN SINGLE: CPT | Mod: AS | Performed by: NURSE PRACTITIONER

## 2022-10-12 PROCEDURE — 700102 HCHG RX REV CODE 250 W/ 637 OVERRIDE(OP): Performed by: NURSE PRACTITIONER

## 2022-10-12 PROCEDURE — C1898 LEAD, PMKR, OTHER THAN TRANS: HCPCS | Performed by: THORACIC SURGERY (CARDIOTHORACIC VASCULAR SURGERY)

## 2022-10-12 PROCEDURE — 85014 HEMATOCRIT: CPT | Mod: 91

## 2022-10-12 PROCEDURE — 85347 COAGULATION TIME ACTIVATED: CPT | Mod: 91

## 2022-10-12 PROCEDURE — 700105 HCHG RX REV CODE 258: Performed by: EMERGENCY MEDICINE

## 2022-10-12 PROCEDURE — 160031 HCHG SURGERY MINUTES - 1ST 30 MINS LEVEL 5: Performed by: THORACIC SURGERY (CARDIOTHORACIC VASCULAR SURGERY)

## 2022-10-12 PROCEDURE — 700101 HCHG RX REV CODE 250: Performed by: NURSE PRACTITIONER

## 2022-10-12 PROCEDURE — C9248 INJ, CLEVIDIPINE BUTYRATE: HCPCS | Performed by: EMERGENCY MEDICINE

## 2022-10-12 PROCEDURE — C1729 CATH, DRAINAGE: HCPCS | Performed by: THORACIC SURGERY (CARDIOTHORACIC VASCULAR SURGERY)

## 2022-10-12 PROCEDURE — 93312 ECHO TRANSESOPHAGEAL: CPT

## 2022-10-12 PROCEDURE — 700101 HCHG RX REV CODE 250

## 2022-10-12 PROCEDURE — 700111 HCHG RX REV CODE 636 W/ 250 OVERRIDE (IP): Performed by: NURSE PRACTITIONER

## 2022-10-12 PROCEDURE — 82803 BLOOD GASES ANY COMBINATION: CPT

## 2022-10-12 PROCEDURE — 36556 INSERT NON-TUNNEL CV CATH: CPT | Performed by: EMERGENCY MEDICINE

## 2022-10-12 PROCEDURE — 33508 ENDOSCOPIC VEIN HARVEST: CPT | Mod: 59 | Performed by: THORACIC SURGERY (CARDIOTHORACIC VASCULAR SURGERY)

## 2022-10-12 PROCEDURE — 021009W BYPASS CORONARY ARTERY, ONE ARTERY FROM AORTA WITH AUTOLOGOUS VENOUS TISSUE, OPEN APPROACH: ICD-10-PCS | Performed by: THORACIC SURGERY (CARDIOTHORACIC VASCULAR SURGERY)

## 2022-10-12 PROCEDURE — 33533 CABG ARTERIAL SINGLE: CPT | Mod: AS | Performed by: NURSE PRACTITIONER

## 2022-10-12 PROCEDURE — 37799 UNLISTED PX VASCULAR SURGERY: CPT

## 2022-10-12 PROCEDURE — 84295 ASSAY OF SERUM SODIUM: CPT | Mod: 91

## 2022-10-12 PROCEDURE — 110371 HCHG SHELL REV 272: Performed by: THORACIC SURGERY (CARDIOTHORACIC VASCULAR SURGERY)

## 2022-10-12 PROCEDURE — 700111 HCHG RX REV CODE 636 W/ 250 OVERRIDE (IP): Performed by: CLINICAL NURSE SPECIALIST

## 2022-10-12 PROCEDURE — 700101 HCHG RX REV CODE 250: Performed by: EMERGENCY MEDICINE

## 2022-10-12 PROCEDURE — 84132 ASSAY OF SERUM POTASSIUM: CPT | Mod: 91

## 2022-10-12 PROCEDURE — 85049 AUTOMATED PLATELET COUNT: CPT

## 2022-10-12 PROCEDURE — 99291 CRITICAL CARE FIRST HOUR: CPT | Performed by: STUDENT IN AN ORGANIZED HEALTH CARE EDUCATION/TRAINING PROGRAM

## 2022-10-12 PROCEDURE — 82330 ASSAY OF CALCIUM: CPT

## 2022-10-12 PROCEDURE — 00567 ANES DIRECT CABG W/PUMP: CPT | Performed by: EMERGENCY MEDICINE

## 2022-10-12 PROCEDURE — A9270 NON-COVERED ITEM OR SERVICE: HCPCS | Performed by: NURSE PRACTITIONER

## 2022-10-12 PROCEDURE — P9045 ALBUMIN (HUMAN), 5%, 250 ML: HCPCS | Performed by: NURSE PRACTITIONER

## 2022-10-12 PROCEDURE — C1751 CATH, INF, PER/CENT/MIDLINE: HCPCS | Performed by: THORACIC SURGERY (CARDIOTHORACIC VASCULAR SURGERY)

## 2022-10-12 PROCEDURE — 93320 DOPPLER ECHO COMPLETE: CPT | Mod: 26 | Performed by: EMERGENCY MEDICINE

## 2022-10-12 PROCEDURE — C9248 INJ, CLEVIDIPINE BUTYRATE: HCPCS | Performed by: NURSE PRACTITIONER

## 2022-10-12 PROCEDURE — 36620 INSERTION CATHETER ARTERY: CPT | Performed by: EMERGENCY MEDICINE

## 2022-10-12 PROCEDURE — 85520 HEPARIN ASSAY: CPT

## 2022-10-12 PROCEDURE — 93005 ELECTROCARDIOGRAM TRACING: CPT | Performed by: NURSE PRACTITIONER

## 2022-10-12 PROCEDURE — 770022 HCHG ROOM/CARE - ICU (200)

## 2022-10-12 PROCEDURE — 85730 THROMBOPLASTIN TIME PARTIAL: CPT

## 2022-10-12 PROCEDURE — 700117 HCHG RX CONTRAST REV CODE 255: Performed by: NURSE PRACTITIONER

## 2022-10-12 PROCEDURE — 700105 HCHG RX REV CODE 258

## 2022-10-12 PROCEDURE — 700105 HCHG RX REV CODE 258: Performed by: NURSE PRACTITIONER

## 2022-10-12 PROCEDURE — 33508 ENDOSCOPIC VEIN HARVEST: CPT | Mod: AS,59 | Performed by: NURSE PRACTITIONER

## 2022-10-12 PROCEDURE — 160009 HCHG ANES TIME/MIN: Performed by: THORACIC SURGERY (CARDIOTHORACIC VASCULAR SURGERY)

## 2022-10-12 PROCEDURE — 93325 DOPPLER ECHO COLOR FLOW MAPG: CPT | Mod: 26 | Performed by: EMERGENCY MEDICINE

## 2022-10-12 PROCEDURE — 82962 GLUCOSE BLOOD TEST: CPT | Mod: 91

## 2022-10-12 PROCEDURE — 94002 VENT MGMT INPAT INIT DAY: CPT

## 2022-10-12 PROCEDURE — 160042 HCHG SURGERY MINUTES - EA ADDL 1 MIN LEVEL 5: Performed by: THORACIC SURGERY (CARDIOTHORACIC VASCULAR SURGERY)

## 2022-10-12 PROCEDURE — 503001 HCHG PERFUSION: Performed by: THORACIC SURGERY (CARDIOTHORACIC VASCULAR SURGERY)

## 2022-10-12 PROCEDURE — 700111 HCHG RX REV CODE 636 W/ 250 OVERRIDE (IP): Performed by: STUDENT IN AN ORGANIZED HEALTH CARE EDUCATION/TRAINING PROGRAM

## 2022-10-12 PROCEDURE — 85610 PROTHROMBIN TIME: CPT

## 2022-10-12 PROCEDURE — 700111 HCHG RX REV CODE 636 W/ 250 OVERRIDE (IP): Performed by: EMERGENCY MEDICINE

## 2022-10-12 PROCEDURE — A9270 NON-COVERED ITEM OR SERVICE: HCPCS | Performed by: CLINICAL NURSE SPECIALIST

## 2022-10-12 PROCEDURE — 93308 TTE F-UP OR LMTD: CPT

## 2022-10-12 PROCEDURE — 93312 ECHO TRANSESOPHAGEAL: CPT | Mod: 26,59 | Performed by: EMERGENCY MEDICINE

## 2022-10-12 PROCEDURE — 700105 HCHG RX REV CODE 258: Performed by: THORACIC SURGERY (CARDIOTHORACIC VASCULAR SURGERY)

## 2022-10-12 PROCEDURE — 160048 HCHG OR STATISTICAL LEVEL 1-5: Performed by: THORACIC SURGERY (CARDIOTHORACIC VASCULAR SURGERY)

## 2022-10-12 PROCEDURE — 33517 CABG ARTERY-VEIN SINGLE: CPT | Performed by: THORACIC SURGERY (CARDIOTHORACIC VASCULAR SURGERY)

## 2022-10-12 PROCEDURE — 93010 ELECTROCARDIOGRAM REPORT: CPT | Performed by: INTERNAL MEDICINE

## 2022-10-12 PROCEDURE — C1725 CATH, TRANSLUMIN NON-LASER: HCPCS | Performed by: THORACIC SURGERY (CARDIOTHORACIC VASCULAR SURGERY)

## 2022-10-12 PROCEDURE — 700102 HCHG RX REV CODE 250 W/ 637 OVERRIDE(OP): Performed by: CLINICAL NURSE SPECIALIST

## 2022-10-12 PROCEDURE — 94150 VITAL CAPACITY TEST: CPT

## 2022-10-12 PROCEDURE — 83735 ASSAY OF MAGNESIUM: CPT

## 2022-10-12 PROCEDURE — 33533 CABG ARTERIAL SINGLE: CPT | Performed by: THORACIC SURGERY (CARDIOTHORACIC VASCULAR SURGERY)

## 2022-10-12 PROCEDURE — 71045 X-RAY EXAM CHEST 1 VIEW: CPT

## 2022-10-12 PROCEDURE — 700111 HCHG RX REV CODE 636 W/ 250 OVERRIDE (IP)

## 2022-10-12 PROCEDURE — 700111 HCHG RX REV CODE 636 W/ 250 OVERRIDE (IP): Performed by: THORACIC SURGERY (CARDIOTHORACIC VASCULAR SURGERY)

## 2022-10-12 PROCEDURE — P9045 ALBUMIN (HUMAN), 5%, 250 ML: HCPCS | Performed by: EMERGENCY MEDICINE

## 2022-10-12 PROCEDURE — 700101 HCHG RX REV CODE 250: Performed by: CLINICAL NURSE SPECIALIST

## 2022-10-12 PROCEDURE — 700101 HCHG RX REV CODE 250: Performed by: THORACIC SURGERY (CARDIOTHORACIC VASCULAR SURGERY)

## 2022-10-12 PROCEDURE — 06BP4ZZ EXCISION OF RIGHT SAPHENOUS VEIN, PERCUTANEOUS ENDOSCOPIC APPROACH: ICD-10-PCS | Performed by: THORACIC SURGERY (CARDIOTHORACIC VASCULAR SURGERY)

## 2022-10-12 PROCEDURE — 02100Z9 BYPASS CORONARY ARTERY, ONE ARTERY FROM LEFT INTERNAL MAMMARY, OPEN APPROACH: ICD-10-PCS | Performed by: THORACIC SURGERY (CARDIOTHORACIC VASCULAR SURGERY)

## 2022-10-12 PROCEDURE — 700105 HCHG RX REV CODE 258: Performed by: CLINICAL NURSE SPECIALIST

## 2022-10-12 DEVICE — MARKER GRAFT AC VEIN DISK SHAPED (10EA/BX): Type: IMPLANTABLE DEVICE | Site: HEART | Status: FUNCTIONAL

## 2022-10-12 RX ORDER — SODIUM CHLORIDE, SODIUM LACTATE, POTASSIUM CHLORIDE, CALCIUM CHLORIDE 600; 310; 30; 20 MG/100ML; MG/100ML; MG/100ML; MG/100ML
INJECTION, SOLUTION INTRAVENOUS
Status: DISCONTINUED | OUTPATIENT
Start: 2022-10-12 | End: 2022-10-12 | Stop reason: SURG

## 2022-10-12 RX ORDER — MIDAZOLAM HYDROCHLORIDE 1 MG/ML
2 INJECTION INTRAMUSCULAR; INTRAVENOUS
Status: DISCONTINUED | OUTPATIENT
Start: 2022-10-12 | End: 2022-10-13

## 2022-10-12 RX ORDER — BISACODYL 10 MG
10 SUPPOSITORY, RECTAL RECTAL
Status: DISCONTINUED | OUTPATIENT
Start: 2022-10-12 | End: 2022-10-18 | Stop reason: HOSPADM

## 2022-10-12 RX ORDER — MAGNESIUM SULFATE 1 G/100ML
1 INJECTION INTRAVENOUS DAILY
Status: COMPLETED | OUTPATIENT
Start: 2022-10-12 | End: 2022-10-14

## 2022-10-12 RX ORDER — SODIUM CHLORIDE, SODIUM GLUCONATE, SODIUM ACETATE, POTASSIUM CHLORIDE AND MAGNESIUM CHLORIDE 526; 502; 368; 37; 30 MG/100ML; MG/100ML; MG/100ML; MG/100ML; MG/100ML
INJECTION, SOLUTION INTRAVENOUS
Status: DISCONTINUED | OUTPATIENT
Start: 2022-10-12 | End: 2022-10-12 | Stop reason: SURG

## 2022-10-12 RX ORDER — CALCIUM CHLORIDE 100 MG/ML
INJECTION INTRAVENOUS; INTRAVENTRICULAR PRN
Status: DISCONTINUED | OUTPATIENT
Start: 2022-10-12 | End: 2022-10-12 | Stop reason: SURG

## 2022-10-12 RX ORDER — METHADONE HYDROCHLORIDE 10 MG/ML
20 INJECTION, SOLUTION INTRAMUSCULAR; INTRAVENOUS; SUBCUTANEOUS ONCE
Status: COMPLETED | OUTPATIENT
Start: 2022-10-12 | End: 2022-10-12

## 2022-10-12 RX ORDER — CEFAZOLIN SODIUM 1 G/3ML
INJECTION, POWDER, FOR SOLUTION INTRAMUSCULAR; INTRAVENOUS PRN
Status: DISCONTINUED | OUTPATIENT
Start: 2022-10-12 | End: 2022-10-12 | Stop reason: SURG

## 2022-10-12 RX ORDER — ESMOLOL HYDROCHLORIDE 10 MG/ML
INJECTION INTRAVENOUS PRN
Status: DISCONTINUED | OUTPATIENT
Start: 2022-10-12 | End: 2022-10-12 | Stop reason: SURG

## 2022-10-12 RX ORDER — ALBUMIN, HUMAN INJ 5% 5 %
25 SOLUTION INTRAVENOUS ONCE
Status: COMPLETED | OUTPATIENT
Start: 2022-10-12 | End: 2022-10-13

## 2022-10-12 RX ORDER — ROSUVASTATIN CALCIUM 20 MG/1
20 TABLET, COATED ORAL
Status: DISCONTINUED | OUTPATIENT
Start: 2022-10-12 | End: 2022-10-18 | Stop reason: HOSPADM

## 2022-10-12 RX ORDER — SODIUM CHLORIDE 9 MG/ML
INJECTION, SOLUTION INTRAVENOUS
Status: DISCONTINUED | OUTPATIENT
Start: 2022-10-12 | End: 2022-10-12 | Stop reason: SURG

## 2022-10-12 RX ORDER — OMEPRAZOLE 20 MG/1
20 CAPSULE, DELAYED RELEASE ORAL DAILY
Status: DISCONTINUED | OUTPATIENT
Start: 2022-10-13 | End: 2022-10-18 | Stop reason: HOSPADM

## 2022-10-12 RX ORDER — DEXMEDETOMIDINE HYDROCHLORIDE 4 UG/ML
0-1.5 INJECTION, SOLUTION INTRAVENOUS CONTINUOUS
Status: DISCONTINUED | OUTPATIENT
Start: 2022-10-12 | End: 2022-10-12

## 2022-10-12 RX ORDER — LIDOCAINE HYDROCHLORIDE 20 MG/ML
INJECTION, SOLUTION EPIDURAL; INFILTRATION; INTRACAUDAL; PERINEURAL PRN
Status: DISCONTINUED | OUTPATIENT
Start: 2022-10-12 | End: 2022-10-12 | Stop reason: SURG

## 2022-10-12 RX ORDER — PAPAVERINE HYDROCHLORIDE 30 MG/ML
INJECTION INTRAMUSCULAR; INTRAVENOUS
Status: DISCONTINUED | OUTPATIENT
Start: 2022-10-12 | End: 2022-10-12

## 2022-10-12 RX ORDER — ACETAMINOPHEN 500 MG
1000 TABLET ORAL EVERY 6 HOURS
Status: DISPENSED | OUTPATIENT
Start: 2022-10-12 | End: 2022-10-17

## 2022-10-12 RX ORDER — SODIUM CHLORIDE, SODIUM GLUCONATE, SODIUM ACETATE, POTASSIUM CHLORIDE AND MAGNESIUM CHLORIDE 526; 502; 368; 37; 30 MG/100ML; MG/100ML; MG/100ML; MG/100ML; MG/100ML
INJECTION, SOLUTION INTRAVENOUS PRN
Status: DISCONTINUED | OUTPATIENT
Start: 2022-10-12 | End: 2022-10-18 | Stop reason: HOSPADM

## 2022-10-12 RX ORDER — ALUMINA, MAGNESIA, AND SIMETHICONE 2400; 2400; 240 MG/30ML; MG/30ML; MG/30ML
30 SUSPENSION ORAL EVERY 4 HOURS PRN
Status: DISCONTINUED | OUTPATIENT
Start: 2022-10-12 | End: 2022-10-18 | Stop reason: HOSPADM

## 2022-10-12 RX ORDER — TRAMADOL HYDROCHLORIDE 50 MG/1
50 TABLET ORAL EVERY 4 HOURS PRN
Status: DISCONTINUED | OUTPATIENT
Start: 2022-10-12 | End: 2022-10-18 | Stop reason: HOSPADM

## 2022-10-12 RX ORDER — SODIUM CHLORIDE 9 MG/ML
INJECTION, SOLUTION INTRAVENOUS CONTINUOUS
Status: DISCONTINUED | OUTPATIENT
Start: 2022-10-12 | End: 2022-10-13

## 2022-10-12 RX ORDER — OXYCODONE HYDROCHLORIDE 10 MG/1
10 TABLET ORAL
Status: DISCONTINUED | OUTPATIENT
Start: 2022-10-12 | End: 2022-10-18 | Stop reason: HOSPADM

## 2022-10-12 RX ORDER — LOSARTAN POTASSIUM 50 MG/1
100 TABLET ORAL
Status: DISCONTINUED | OUTPATIENT
Start: 2022-10-14 | End: 2022-10-18 | Stop reason: HOSPADM

## 2022-10-12 RX ORDER — NOREPINEPHRINE BITARTRATE 0.03 MG/ML
0-30 INJECTION, SOLUTION INTRAVENOUS CONTINUOUS
Status: DISCONTINUED | OUTPATIENT
Start: 2022-10-12 | End: 2022-10-12

## 2022-10-12 RX ORDER — PROCHLORPERAZINE EDISYLATE 5 MG/ML
10 INJECTION INTRAMUSCULAR; INTRAVENOUS EVERY 6 HOURS PRN
Status: DISCONTINUED | OUTPATIENT
Start: 2022-10-12 | End: 2022-10-18 | Stop reason: HOSPADM

## 2022-10-12 RX ORDER — MAGNESIUM HYDROXIDE 1200 MG/15ML
LIQUID ORAL
Status: DISCONTINUED | OUTPATIENT
Start: 2022-10-12 | End: 2022-10-12

## 2022-10-12 RX ORDER — DEXMEDETOMIDINE HYDROCHLORIDE 4 UG/ML
0-1.5 INJECTION, SOLUTION INTRAVENOUS CONTINUOUS
Status: DISCONTINUED | OUTPATIENT
Start: 2022-10-12 | End: 2022-10-13

## 2022-10-12 RX ORDER — AMOXICILLIN 250 MG
2 CAPSULE ORAL 2 TIMES DAILY
Status: DISCONTINUED | OUTPATIENT
Start: 2022-10-12 | End: 2022-10-18 | Stop reason: HOSPADM

## 2022-10-12 RX ORDER — MIDAZOLAM HYDROCHLORIDE 1 MG/ML
INJECTION INTRAMUSCULAR; INTRAVENOUS PRN
Status: DISCONTINUED | OUTPATIENT
Start: 2022-10-12 | End: 2022-10-12 | Stop reason: SURG

## 2022-10-12 RX ORDER — CARVEDILOL 3.12 MG/1
3.12 TABLET ORAL 2 TIMES DAILY WITH MEALS
Status: DISCONTINUED | OUTPATIENT
Start: 2022-10-14 | End: 2022-10-17

## 2022-10-12 RX ORDER — PROMETHAZINE HYDROCHLORIDE 25 MG/1
25 SUPPOSITORY RECTAL EVERY 6 HOURS PRN
Status: DISCONTINUED | OUTPATIENT
Start: 2022-10-12 | End: 2022-10-18 | Stop reason: HOSPADM

## 2022-10-12 RX ORDER — ONDANSETRON 2 MG/ML
8 INJECTION INTRAMUSCULAR; INTRAVENOUS EVERY 6 HOURS PRN
Status: DISCONTINUED | OUTPATIENT
Start: 2022-10-12 | End: 2022-10-18 | Stop reason: HOSPADM

## 2022-10-12 RX ORDER — HEPARIN SODIUM,PORCINE 1000/ML
VIAL (ML) INJECTION PRN
Status: DISCONTINUED | OUTPATIENT
Start: 2022-10-12 | End: 2022-10-12 | Stop reason: SURG

## 2022-10-12 RX ORDER — EPINEPHRINE HCL IN 0.9 % NACL 4MG/250ML
0-.2 PLASTIC BAG, INJECTION (ML) INTRAVENOUS CONTINUOUS
Status: DISCONTINUED | OUTPATIENT
Start: 2022-10-12 | End: 2022-10-12

## 2022-10-12 RX ORDER — OXYCODONE HYDROCHLORIDE 5 MG/1
5 TABLET ORAL
Status: DISCONTINUED | OUTPATIENT
Start: 2022-10-12 | End: 2022-10-18 | Stop reason: HOSPADM

## 2022-10-12 RX ORDER — POLYETHYLENE GLYCOL 3350 17 G/17G
1 POWDER, FOR SOLUTION ORAL DAILY
Status: DISCONTINUED | OUTPATIENT
Start: 2022-10-13 | End: 2022-10-18 | Stop reason: HOSPADM

## 2022-10-12 RX ORDER — CLOPIDOGREL BISULFATE 75 MG/1
75 TABLET ORAL DAILY
Status: DISCONTINUED | OUTPATIENT
Start: 2022-10-13 | End: 2022-10-18 | Stop reason: HOSPADM

## 2022-10-12 RX ORDER — NOREPINEPHRINE BITARTRATE 0.03 MG/ML
0-30 INJECTION, SOLUTION INTRAVENOUS CONTINUOUS
Status: DISCONTINUED | OUTPATIENT
Start: 2022-10-12 | End: 2022-10-13

## 2022-10-12 RX ORDER — ALBUMIN, HUMAN INJ 5% 5 %
SOLUTION INTRAVENOUS PRN
Status: DISCONTINUED | OUTPATIENT
Start: 2022-10-12 | End: 2022-10-12 | Stop reason: SURG

## 2022-10-12 RX ORDER — ACETAMINOPHEN 500 MG
1000 TABLET ORAL EVERY 6 HOURS PRN
Status: DISCONTINUED | OUTPATIENT
Start: 2022-10-17 | End: 2022-10-18 | Stop reason: HOSPADM

## 2022-10-12 RX ORDER — NITROGLYCERIN 20 MG/100ML
0-100 INJECTION INTRAVENOUS CONTINUOUS
Status: DISCONTINUED | OUTPATIENT
Start: 2022-10-12 | End: 2022-10-13

## 2022-10-12 RX ORDER — PROTAMINE SULFATE 10 MG/ML
INJECTION, SOLUTION INTRAVENOUS PRN
Status: DISCONTINUED | OUTPATIENT
Start: 2022-10-12 | End: 2022-10-12 | Stop reason: SURG

## 2022-10-12 RX ORDER — ROCURONIUM BROMIDE 10 MG/ML
INJECTION, SOLUTION INTRAVENOUS PRN
Status: DISCONTINUED | OUTPATIENT
Start: 2022-10-12 | End: 2022-10-12 | Stop reason: SURG

## 2022-10-12 RX ORDER — DIPHENHYDRAMINE HCL 25 MG
25 TABLET ORAL
Status: DISCONTINUED | OUTPATIENT
Start: 2022-10-12 | End: 2022-10-18 | Stop reason: HOSPADM

## 2022-10-12 RX ADMIN — PROTAMINE SULFATE 250 MG: 10 INJECTION, SOLUTION INTRAVENOUS at 11:33

## 2022-10-12 RX ADMIN — MIDAZOLAM HYDROCHLORIDE 2 MG: 1 INJECTION, SOLUTION INTRAMUSCULAR; INTRAVENOUS at 08:15

## 2022-10-12 RX ADMIN — SODIUM CHLORIDE, SODIUM GLUCONATE, SODIUM ACETATE, POTASSIUM CHLORIDE AND MAGNESIUM CHLORIDE: 526; 502; 368; 37; 30 INJECTION, SOLUTION INTRAVENOUS at 08:45

## 2022-10-12 RX ADMIN — CLEVIPIDINE 3 MG/HR: 0.5 EMULSION INTRAVENOUS at 15:03

## 2022-10-12 RX ADMIN — AMINOCAPROIC ACID 2 G/HR: 250 INJECTION, SOLUTION INTRAVENOUS at 09:35

## 2022-10-12 RX ADMIN — FENTANYL CITRATE 200 MCG: 50 INJECTION, SOLUTION INTRAMUSCULAR; INTRAVENOUS at 08:26

## 2022-10-12 RX ADMIN — ALBUMIN (HUMAN) 25 G: 2.5 SOLUTION INTRAVENOUS at 18:19

## 2022-10-12 RX ADMIN — CALCIUM CHLORIDE 1 G: 100 INJECTION INTRAVENOUS; INTRAVENTRICULAR at 11:35

## 2022-10-12 RX ADMIN — MAGNESIUM SULFATE HEPTAHYDRATE 1 G: 1 INJECTION, SOLUTION INTRAVENOUS at 13:09

## 2022-10-12 RX ADMIN — OXYCODONE HYDROCHLORIDE 10 MG: 10 TABLET ORAL at 19:54

## 2022-10-12 RX ADMIN — LIDOCAINE HYDROCHLORIDE 100 MG: 20 INJECTION, SOLUTION EPIDURAL; INFILTRATION; INTRACAUDAL at 08:26

## 2022-10-12 RX ADMIN — SODIUM CHLORIDE: 9 INJECTION, SOLUTION INTRAVENOUS at 08:45

## 2022-10-12 RX ADMIN — FENTANYL CITRATE 50 MCG: 50 INJECTION, SOLUTION INTRAMUSCULAR; INTRAVENOUS at 17:31

## 2022-10-12 RX ADMIN — ALBUMIN (HUMAN) 250 ML: 2.5 SOLUTION INTRAVENOUS at 11:40

## 2022-10-12 RX ADMIN — CLEVIPIDINE 200 MCG: 0.5 EMULSION INTRAVENOUS at 10:17

## 2022-10-12 RX ADMIN — NOREPINEPHRINE BITARTRATE 2 MCG/MIN: 1 INJECTION, SOLUTION, CONCENTRATE INTRAVENOUS at 11:33

## 2022-10-12 RX ADMIN — DEXMEDETOMIDINE 0.5 MCG/KG/HR: 200 INJECTION, SOLUTION INTRAVENOUS at 11:49

## 2022-10-12 RX ADMIN — FENTANYL CITRATE 50 MCG: 50 INJECTION, SOLUTION INTRAMUSCULAR; INTRAVENOUS at 17:10

## 2022-10-12 RX ADMIN — SODIUM CHLORIDE, SODIUM GLUCONATE, SODIUM ACETATE, POTASSIUM CHLORIDE AND MAGNESIUM CHLORIDE: 526; 502; 368; 37; 30 INJECTION, SOLUTION INTRAVENOUS at 11:13

## 2022-10-12 RX ADMIN — CEFAZOLIN 2 G: 330 INJECTION, POWDER, FOR SOLUTION INTRAMUSCULAR; INTRAVENOUS at 09:05

## 2022-10-12 RX ADMIN — METHADONE HYDROCHLORIDE 20 MG: 10 INJECTION, SOLUTION INTRAMUSCULAR; INTRAVENOUS; SUBCUTANEOUS at 09:00

## 2022-10-12 RX ADMIN — FENTANYL CITRATE 50 MCG: 50 INJECTION, SOLUTION INTRAMUSCULAR; INTRAVENOUS at 10:10

## 2022-10-12 RX ADMIN — NOREPINEPHRINE BITARTRATE 2 MCG/MIN: 1 INJECTION INTRAVENOUS at 18:00

## 2022-10-12 RX ADMIN — CLEVIPIDINE 200 MCG: 0.5 EMULSION INTRAVENOUS at 10:12

## 2022-10-12 RX ADMIN — HEPARIN SODIUM 40000 UNITS: 1000 INJECTION, SOLUTION INTRAVENOUS; SUBCUTANEOUS at 09:58

## 2022-10-12 RX ADMIN — CLEVIPIDINE 4 MG/HR: 0.5 EMULSION INTRAVENOUS at 09:18

## 2022-10-12 RX ADMIN — VANCOMYCIN HYDROCHLORIDE 1500 MG: 1 INJECTION, POWDER, LYOPHILIZED, FOR SOLUTION INTRAVENOUS at 08:50

## 2022-10-12 RX ADMIN — SODIUM CHLORIDE, POTASSIUM CHLORIDE, SODIUM LACTATE AND CALCIUM CHLORIDE: 600; 310; 30; 20 INJECTION, SOLUTION INTRAVENOUS at 08:10

## 2022-10-12 RX ADMIN — HEPARIN SODIUM 2000 UNITS: 1000 INJECTION, SOLUTION INTRAVENOUS; SUBCUTANEOUS at 01:42

## 2022-10-12 RX ADMIN — FENTANYL CITRATE 100 MCG: 50 INJECTION, SOLUTION INTRAMUSCULAR; INTRAVENOUS at 12:16

## 2022-10-12 RX ADMIN — ACETAMINOPHEN 1000 MG: 500 TABLET ORAL at 06:16

## 2022-10-12 RX ADMIN — ROCURONIUM BROMIDE 100 MG: 10 INJECTION, SOLUTION INTRAVENOUS at 08:27

## 2022-10-12 RX ADMIN — SODIUM CHLORIDE: 9 INJECTION, SOLUTION INTRAVENOUS at 12:52

## 2022-10-12 RX ADMIN — CLEVIPIDINE 200 MCG: 0.5 EMULSION INTRAVENOUS at 10:07

## 2022-10-12 RX ADMIN — METOPROLOL TARTRATE 12.5 MG: 25 TABLET, FILM COATED ORAL at 06:16

## 2022-10-12 RX ADMIN — PROPOFOL 50 MG: 10 INJECTION, EMULSION INTRAVENOUS at 08:26

## 2022-10-12 RX ADMIN — HUMAN ALBUMIN MICROSPHERES AND PERFLUTREN 3 ML: 10; .22 INJECTION, SOLUTION INTRAVENOUS at 19:35

## 2022-10-12 RX ADMIN — SODIUM CHLORIDE, SODIUM GLUCONATE, SODIUM ACETATE, POTASSIUM CHLORIDE AND MAGNESIUM CHLORIDE: 526; 502; 368; 37; 30 INJECTION, SOLUTION INTRAVENOUS at 12:12

## 2022-10-12 RX ADMIN — CLEVIPIDINE 200 MCG: 0.5 EMULSION INTRAVENOUS at 10:09

## 2022-10-12 RX ADMIN — AMINOCAPROIC ACID 10 G: 250 INJECTION, SOLUTION INTRAVENOUS at 09:05

## 2022-10-12 RX ADMIN — CEFAZOLIN 2 G: 2 INJECTION, POWDER, FOR SOLUTION INTRAMUSCULAR; INTRAVENOUS at 17:15

## 2022-10-12 RX ADMIN — FENTANYL CITRATE 150 MCG: 50 INJECTION, SOLUTION INTRAMUSCULAR; INTRAVENOUS at 09:14

## 2022-10-12 RX ADMIN — ESMOLOL HYDROCHLORIDE 50 MG: 100 INJECTION, SOLUTION INTRAVENOUS at 10:14

## 2022-10-12 RX ADMIN — CLEVIPIDINE 200 MCG: 0.5 EMULSION INTRAVENOUS at 10:10

## 2022-10-12 RX ADMIN — ROSUVASTATIN CALCIUM 20 MG: 20 TABLET, FILM COATED ORAL at 21:00

## 2022-10-12 RX ADMIN — ROCURONIUM BROMIDE 50 MG: 10 INJECTION, SOLUTION INTRAVENOUS at 11:27

## 2022-10-12 ASSESSMENT — PAIN DESCRIPTION - PAIN TYPE
TYPE: ACUTE PAIN

## 2022-10-12 ASSESSMENT — PULMONARY FUNCTION TESTS: FVC: 1.1

## 2022-10-12 NOTE — PROGRESS NOTES
Patient into room at 1235 with OR staff, Intensivist,  RT and RN at bedside, report received from anesthesiologist. All gtts, lines and devices verified. Vital signs stable. ST elevation on EKG inferior and septal leads with depression in avL, pericardial rub heard on auscultation. APN made aware, surgeon aware. Per surgeon watch for now, call back for worsening elevation.

## 2022-10-12 NOTE — CARE PLAN
Problem: Pre Op  Goal: Optimal preparation for CABG/Heart Valve surgery  Intervention: Pre Op education to patient/significant other. Provide patient [facility] Patient Guideline for Cardiac Surgery (See Pt. Ed.)  Note: Patient educated regarding plan of care postoperatively. Educated on daily activities, pain control, bowel protocol, and physical limitations. All questions answered and patient encouraged to reach out to staff with further questions or concerns

## 2022-10-12 NOTE — ASSESSMENT & PLAN NOTE
On home losartan/HCTZ -holding postprocedural, can restart if continued hypertension without problems in a.m.  On Cleviprex  Continue low-dose Coreg

## 2022-10-12 NOTE — ASSESSMENT & PLAN NOTE
Patient presented 10/6 with hypertensive emergency increasing troponin chest pain, no ST changes however troponin continued to rise.  Echo showed septal wall motion abnormalities with apical hypokinesis was taken to cath 10/9 where he was found to have multivessel disease.  Also was noted to have LVOT  Underwent CABG today  Continue ASA, Plavix and statin.   Continue Coreg  Resume ARB and HCTZ if remains stable in a.m.

## 2022-10-12 NOTE — OP REPORT
OPERATIVE NOTE   Clary Bah   10/12/22              PRE-OP DIAGNOSIS: multivessel CAD, NSTEMI, HTN, obesity, hypothyroid            POST-OP DIAGNOSIS: same            PROCEDURE:Coronary artery bypass grafting x2  Left internal mammary artery to left anterior descending artery  Reversed saphenous vein graft to postero-lateral artery  Endo-vein procurement           SURGEON: Dr. Coretta Smyth    FIRST ASSISTANT/ VEIN PROCUREMENT: TERRENCE Galicia            ANESTHESIA: General endotracheal, Dr. Brad Rebollar    CARDIOPULMONARY BYPASS TIME: 56 minutes    AORTIC CROSSCLAMP TIME: 40 minutes                  DRAINS: left pleural 24F peña, mediastinal 32F chest tubes x2            SPECIMENS: none     FINDINGS: good size NICOLETTE target, good conduit both vein and LIMA, very small LAD target consistent with preop imaging change in caliber at mid LAD, good outflow, preserved LVEF           COMPLICATIONS: none identified            DISPOSITION: ICU            CONDITION: intubated, hemodynamically stable             Procedure details:      The patient was taken to the operating room. Standard monitoring lines and Ta catheter were placed. General anesthesia was induced. The patient was prepped and draped in sterile fashion. An endoscopic procurement of the greater saphenous vein in the right leg was carried out by TERRENCE Galicia. Standard median sternotomy was performed. The left internal mammary artery was taken down between cautery and clips. The patient was heparinized and the vessel divided distally. The heart was exposed and pericardial traction sutures placed. The distal aortic and triple stage right atrial venous cannulation was performed. Antegrade cardioplegia catheter was placed. The patient was placed on cardiopulmonary bypass. The aorta was cross-clamped and the heart arrested with [place] cardioplegia. Myocardial protection was maintained with topical cooling.  The first graft was the reversed  saphenous vein to the poster-lateral artery. The artery at the chosen grafting site was exposed and entered. Then the anastomosis was performed in end-to-side fashion with 7-0 prolene suture. The last graft was the left internal mammary artery to the left anterior descending artery. The artery at the chosen grafting site was exposed and entered. Then the anastomosis was performed in end-to-side fashion with 7-0 prolene suture.The pedicle was secured to the epicardium with two 6-0 Prolene sutures. The proximal anastomoses were done in end-to-side fashion utilizing single clamp technique.   The heart was allowed 13 minutes to re-perfuse and BEVERLY was used to verify appropriate wall motion. The patient was then weaned and  from cardiopulmonary bypass. Protamine was given to reverse the heparin. The heart was decannulated. Ventricular pacing wires were placed. The chest tubes were placed. Hemostasis was secured then the sternum was closed using stainless steel wires. The incision was closed in three layers with sutures. Sterile dressings were placed. At the end of the operation, all sponge and needle counts were correct.

## 2022-10-12 NOTE — ANESTHESIA POSTPROCEDURE EVALUATION
Patient: Clary Bah    Procedure Summary     Date: 10/12/22 Room / Location: [facility] 03 / SURGERY [facility]    Anesthesia Start: 0810 Anesthesia Stop: 1240    Procedures:       CORONARY ARTERY BYPASS GRAFTING X2, ENDOSCOPIC VEIN HARVEST (Chest)      ECHOCARDIOGRAM, TRANSESOPHAGEAL (Esophagus) Diagnosis: (CORONARY ARTERY DISEASE)    Surgeons: Coretta Smyth M.D. Responsible Provider: Brad Rebollar M.D.    Anesthesia Type: general ASA Status: 4          Final Anesthesia Type: general  Last vitals  BP   Blood Pressure: (!) 141/79    Temp   36.1 °C (97 °F)    Pulse   81   Resp   20    SpO2   96 %      Anesthesia Post Evaluation    Patient location during evaluation: ICU  Patient participation: complete - patient cannot participate  Post-procedure mental status: sedated.    Airway patency: patent  Anesthetic complications: no  Cardiovascular status: hemodynamically stable  Respiratory status: acceptable, ventilator, intubated and ETT  Hydration status: euvolemic    PONV: none          No notable events documented.     Nurse Pain Score: 0 (NPRS)

## 2022-10-12 NOTE — CONSULTS
Critical Care Consultation    Date of consult: 10/12/2022    Referring Physician  Coretta Smyth M.D.    Reason for Consultation  Status post CABG.     History of Presenting Illness  56 y.o. female who presented 10/6/2022 with history of hypertension SVT hypothyroidism presented on 10/6 on and off substernal chest pain, relieved with rest, elevated troponins but no ST changes.  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      At bedside for post procedure report with RN, RT, CV surgeon, and anesthesia, patient currently on 4 of clevidipine, 0.5 of Dex.  3 chest tubes in place, right IJ, right art line.  No signs of respiratory distress or difficulty oxygenating on vent.      Code Status  Full Code    Review of Systems  ROS post surgical patient is not fully awake or following at this time    Past Medical History   has a past medical history of Anesthesia, Heart palpitations, Hypertension, Hypothyroid, and Vitamin D deficiency.    Surgical History   has a past surgical history that includes biopsy general (10/20/08); aaron by laparoscopy; hysteroscopy with video operative (10/20/08); gyn surgery (1995); and rectocele repair (3/15/2011).    Family History  family history includes Arthritis in her mother; Diabetes in her father; Heart Disease (age of onset: 67) in her father; Hypertension in her mother and sister.    Social History   reports that she has never smoked. She has never used smokeless tobacco. She reports current alcohol use. She reports that she does not use drugs.    Medications  Home Medications       Reviewed by Dwight Velasquez R.N. (Registered Nurse) on 10/12/22 at 0930  Med List Status: Complete     Medication Last Dose Status   acetaminophen (Tylenol) tablet 650 mg  Active   aminocaproic acid (AMICAR) 10 g in  mL IV Bolus  Active   aminocaproic acid (AMICAR) 5 g in  mL Infusion   "Active   aspirin (ASA) chewable tab 81 mg  Active   carvedilol (COREG) tablet 25 mg  Active   ceFAZolin (ANCEF) injection  Active   clevidipine (CLEVIPREX) IV emulsion  Active   dexmedetomidine (PRECEDEX) 400 mcg/100mL NS premix infusion  Active   dextrose 50% (D50W) injection 25 g  Active   electrolyte-A (PLASMALYTE-A) infusion  Active   EPINEPHrine (Adrenalin) infusion 4 mg/250 mL (premix)  Active   fentaNYL (SUBLIMAZE) injection  Active   fluticasone (FLONASE) 50 MCG/ACT nasal spray \"COUPLE DAYS AGO\" Active   heparin infusion 25,000 units in 500 mL 0.45% NACL  Active   heparin injection 2,000 Units  Active   influenza vaccine quad (FLUZONE/FLUARIX) injection 0.5 mL  Active   insulin regular (HumuLIN R) 100 Units in  mL Infusion  Active   insulin regular (HumuLIN R,NovoLIN R) injection  Active   labetalol (NORMODYNE/TRANDATE) injection 20 mg  Active   Lactated Ringers  Active   levothyroxine (SYNTHROID) 100 MCG Tab 10/6/2022 Active   levothyroxine (SYNTHROID) tablet 100 mcg  Active   lidocaine (XYLOCAINE) 1 % injection 0.5 mL  Active   lidocaine PF (XYLOCAINE-MPF) 2 % injection PF  Active   losartan (COZAAR) tablet 100 mg  Active   losartan-hydrochlorothiazide (HYZAAR) 100-25 MG per tablet 10/6/2022 Active   metoprolol SR (TOPROL XL) 100 MG TABLET SR 24 HR 10/6/2022 Active   midazolam (Versed) injection  Active   morphine 4 MG/ML injection 1 mg  Active   Multiple Vitamins-Minerals (MULTIVITAMIN & MINERAL PO) 10/6/2022 Active   norepinephrine (Levophed) 8 mg in 250 mL NS infusion (premix)  Active   NS infusion  Active   ondansetron (ZOFRAN ODT) dispertab 4 mg  Active   ondansetron (ZOFRAN) syringe/vial injection 4 mg  Active   propofol (DIPRIVAN) injection  Active   rocuronium (ZEMURON) injection  Active   rosuvastatin (CRESTOR) tablet 20 mg  Active                  Current Facility-Administered Medications   Medication Dose Route Frequency Provider Last Rate Last Admin    Respiratory Therapy Consult   " Nebulization Continuous RT POPPY SchulerPAugsutaRFLEX        NS infusion   Intravenous Continuous POPPY SchulerPAugustaRABHI. 10 mL/hr at 10/12/22 1252 New Bag at 10/12/22 1252    electrolyte-A (PLASMALYTE-A) infusion   Intravenous PRN POPPY SchulerPAugustaRFLEX        calcium CHLORIDE 1,000 mg in dextrose 5% 100 mL IVPB  1,000 mg Intravenous Once PRN POPPY SchulerPAugustaRFLEX        magnesium sulfate in D5W IVPB premix 1 g  1 g Intravenous DAILY POPPY SchulerP.RABHI.   Stopped at 10/12/22 1409    K+ Scale: Goal of 4.5  1 Each Intravenous Q6HRS POPPY SchulerPAugustaRABHI.        clevidipine (Cleviprex) IV emulsion  0-21 mg/hr Intravenous Continuous Ana Figueroa A.P.R.N. 2 mL/hr at 10/12/22 1519 1 mg/hr at 10/12/22 1519    nitroglycerin 50 mg in D5W 250 ml infusion  0-100 mcg/min Intravenous Continuous POPPY SchulerPAugustaRFLEX   Dose not Required at 10/12/22 1245    Pharmacy Consult Request ...Pain Management Review 1 Each  1 Each Other PHARMACY TO DOSE KATIE SchulerRFLEX        acetaminophen (TYLENOL) tablet 1,000 mg  1,000 mg Oral Q6HRS POPPY SchulerPAugustaRABHI.        Followed by    [START ON 10/17/2022] acetaminophen (TYLENOL) tablet 1,000 mg  1,000 mg Oral Q6HRS PRN POPPY SchulerP.RAugustaN.        oxyCODONE immediate-release (ROXICODONE) tablet 5 mg  5 mg Oral Q3HRS PRN KAY Schuler.P.R.N.        Or    oxyCODONE immediate release (ROXICODONE) tablet 10 mg  10 mg Oral Q3HRS PRN KAY Schuler.P.R.MADELINE.        Or    fentaNYL (SUBLIMAZE) injection 50 mcg  50 mcg Intravenous Q3HRS PRN KAY Schuler.P.R.MADELINE.        traMADol (Ultram) 50 MG tablet 50 mg  50 mg Oral Q4HRS PRN KATIE SchulerRABHI.        midazolam (Versed) injection 2 mg  2 mg Intravenous Q HOUR PRN KATIE SchulerRABHI.        dexmedetomidine (PRECEDEX) 400 mcg/100mL NS premix infusion  0-1.5 mcg/kg/hr Intravenous Continuous KATIE SchulerRAugustaN. 4.7 mL/hr at 10/12/22 1511 0.2 mcg/kg/hr at 10/12/22 1511     sodium bicarbonate 8.4 % injection 50 mEq  50 mEq Intravenous Q HOUR PRN Ana Figueroa A.P.R.N.        morphine 10 mg/mL injection 4 mg  4 mg Intravenous Q HOUR PRN Ana Figueroa A.P.R.N.        ondansetron (ZOFRAN) syringe/vial injection 8 mg  8 mg Intravenous Q6HRS PRN Ana Figueroa A.P.R.N.        Or    prochlorperazine (COMPAZINE) injection 10 mg  10 mg Intravenous Q6HRS PRN Ana Figueroa A.P.R.N.        Or    promethazine (PHENERGAN) suppository 25 mg  25 mg Rectal Q6HRS PRN Ana Figueroa A.P.R.N.        senna-docusate (PERICOLACE or SENOKOT S) 8.6-50 MG per tablet 2 Tablet  2 Tablet Oral BID KAY Schuler.P.R.N.        And    [START ON 10/13/2022] polyethylene glycol/lytes (MIRALAX) PACKET 1 Packet  1 Packet Oral DAILY KAY Schuler.P.R.N.        And    [START ON 10/14/2022] magnesium hydroxide (MILK OF MAGNESIA) suspension 30 mL  30 mL Oral DAILY KAY Schuler.P.R.N.        And    bisacodyl (DULCOLAX) suppository 10 mg  10 mg Rectal QDAY PRN Ana Figueroa A.P.R.N.        [START ON 10/13/2022] omeprazole (PRILOSEC) capsule 20 mg  20 mg Oral DAILY Ana Figueroa A.P.R.N.        mag hydrox-al hydrox-simeth (MAALOX PLUS ES or MYLANTA DS) suspension 30 mL  30 mL Oral Q4HRS PRN KAY Schuler.P.R.N.        diphenhydrAMINE (BENADRYL) tablet/capsule 25 mg  25 mg Oral HS PRN - MR X 1 KAY Schuler.P.R.N.        ceFAZolin (Ancef) 2 g in  mL IVPB  2 g Intravenous Once KAY Schuler.P.R.N.        [START ON 10/13/2022] clopidogrel (PLAVIX) tablet 75 mg  75 mg Oral DAILY KAY Schuler.P.R.N.        rosuvastatin (CRESTOR) tablet 20 mg  20 mg Oral QHS POPPY SchulerP.R.N.        [START ON 10/14/2022] carvedilol (COREG) tablet 3.125 mg  3.125 mg Oral BID WITH MEALS POPPY ZendejasP.N.        [START ON 10/14/2022] losartan (COZAAR) tablet 100 mg  100 mg Oral Q DAY POPPY ZendejasP.N.        [START ON 10/13/2022] aspirin EC (ECOTRIN) tablet  81 mg  81 mg Oral DAILY MARISELA Schuler        influenza vaccine quad (FLUZONE/FLUARIX) injection 0.5 mL  0.5 mL Intramuscular Once PRN Ladnon Haider M.D.        levothyroxine (SYNTHROID) tablet 100 mcg  100 mcg Oral AM ES Addy Pineda M.D.   100 mcg at 10/11/22 0436       Allergies  Allergies   Allergen Reactions    Ace Inhibitors Cough    Atorvastatin Unspecified     Statin not indicted with coronary calcium score of 0.       Vital Signs last 24 hours  Temp:  [36.1 °C (97 °F)-37.3 °C (99.1 °F)] 36.1 °C (97 °F)  Pulse:  [81-99] 89  Resp:  [8-31] 19  BP: (107-158)/(56-98) 114/72  SpO2:  [91 %-100 %] 99 %    Physical Exam  Physical Exam  Vitals and nursing note reviewed.   Constitutional:       General: She is not in acute distress.     Appearance: Normal appearance.      Comments: Sedated intubated no acute distress   HENT:      Head: Normocephalic and atraumatic.      Mouth/Throat:      Mouth: Mucous membranes are moist.      Pharynx: No oropharyngeal exudate.   Eyes:      Extraocular Movements: Extraocular movements intact.      Pupils: Pupils are equal, round, and reactive to light.   Cardiovascular:      Rate and Rhythm: Normal rate and regular rhythm.      Pulses: Normal pulses.      Heart sounds: No murmur heard.    No friction rub. No gallop.   Pulmonary:      Effort: Pulmonary effort is normal. No respiratory distress.      Breath sounds: No wheezing, rhonchi or rales.   Abdominal:      General: Bowel sounds are normal. There is no distension.      Palpations: Abdomen is soft. There is no mass.      Tenderness: There is no abdominal tenderness.   Musculoskeletal:         General: No swelling or tenderness. Normal range of motion.      Cervical back: Normal range of motion. No rigidity. No muscular tenderness.      Right lower leg: No edema.      Left lower leg: No edema.   Skin:     General: Skin is warm and dry.      Capillary Refill: Capillary refill takes less than 2 seconds.      Findings: No  erythema or rash.      Comments: Chest wound dressing clean dry intact, chest tubes in place draining small amount of blood,    Neurological:      General: No focal deficit present.      Motor: No weakness.      Gait: Gait normal.      Comments: Patient still sedated after procedure no focal deficits noted       Fluids    Intake/Output Summary (Last 24 hours) at 10/12/2022 1549  Last data filed at 10/12/2022 1500  Gross per 24 hour   Intake 3368.89 ml   Output 3254 ml   Net 114.89 ml       Laboratory  Recent Results (from the past 48 hour(s))   POCT glucose device results    Collection Time: 10/10/22  4:59 PM   Result Value Ref Range    POC Glucose, Blood 115 (H) 65 - 99 mg/dL   Heparin Anti-Xa    Collection Time: 10/10/22  6:06 PM   Result Value Ref Range    Heparin Xa (UFH) 0.38 IU/mL   POCT glucose device results    Collection Time: 10/10/22  8:29 PM   Result Value Ref Range    POC Glucose, Blood 173 (H) 65 - 99 mg/dL   Heparin Anti-Xa    Collection Time: 10/11/22 12:32 AM   Result Value Ref Range    Heparin Xa (UFH) 0.35 IU/mL   CBC WITHOUT DIFFERENTIAL    Collection Time: 10/11/22 12:32 AM   Result Value Ref Range    WBC 9.7 4.8 - 10.8 K/uL    RBC 4.52 4.20 - 5.40 M/uL    Hemoglobin 14.0 12.0 - 16.0 g/dL    Hematocrit 40.6 37.0 - 47.0 %    MCV 89.8 81.4 - 97.8 fL    MCH 31.0 27.0 - 33.0 pg    MCHC 34.5 33.6 - 35.0 g/dL    RDW 43.4 35.9 - 50.0 fL    Platelet Count 238 164 - 446 K/uL    MPV 10.1 9.0 - 12.9 fL   Basic Metabolic Panel    Collection Time: 10/11/22 12:32 AM   Result Value Ref Range    Sodium 134 (L) 135 - 145 mmol/L    Potassium 3.8 3.6 - 5.5 mmol/L    Chloride 101 96 - 112 mmol/L    Co2 21 20 - 33 mmol/L    Glucose 111 (H) 65 - 99 mg/dL    Bun 17 8 - 22 mg/dL    Creatinine 0.82 0.50 - 1.40 mg/dL    Calcium 9.5 8.5 - 10.5 mg/dL    Anion Gap 12.0 7.0 - 16.0   ESTIMATED GFR    Collection Time: 10/11/22 12:32 AM   Result Value Ref Range    GFR (CKD-EPI) 84 >60 mL/min/1.73 m 2   ABO Rh Confirm     Collection Time: 10/11/22 12:32 AM   Result Value Ref Range    ABO Rh Confirm A POS    POCT glucose device results    Collection Time: 10/11/22  9:02 AM   Result Value Ref Range    POC Glucose, Blood 98 65 - 99 mg/dL   POCT glucose device results    Collection Time: 10/11/22 12:45 PM   Result Value Ref Range    POC Glucose, Blood 106 (H) 65 - 99 mg/dL   POCT glucose device results    Collection Time: 10/11/22  5:32 PM   Result Value Ref Range    POC Glucose, Blood 108 (H) 65 - 99 mg/dL   EKG    Collection Time: 10/11/22  8:00 PM   Result Value Ref Range    Report       [facility]    Test Date:  2022-10-11  Pt Name:    PATEL BAUTISTA       Department: 171  MRN:        [id number]                      Room:       [facility]  Gender:     Female                       Technician: COLBY  :        [id number]                   Requested By:ASHOK POLK  Order #:    [id number]                    Reading MD:    Measurements  Intervals                                Axis  Rate:       93                           P:          52  ME:         154                          QRS:        22  QRSD:       91                           T:          26  QT:         354  QTc:        441    Interpretive Statements  Sinus rhythm  Probable anteroseptal infarct, recent  Compared to ECG 10/09/2022 02:44:31  Myocardial infarct finding now present  T-wave abnormality no longer present  Possible ischemia no longer present     Comp Metabolic Panel (CMP)    Collection Time: 10/11/22  8:32 PM   Result Value Ref Range    Sodium 135 135 - 145 mmol/L    Potassium 3.9 3.6 - 5.5 mmol/L    Chloride 102 96 - 112 mmol/L    Co2 21 20 - 33 mmol/L    Anion Gap 12.0 7.0 - 16.0    Glucose 120 (H) 65 - 99 mg/dL    Bun 18 8 - 22 mg/dL    Creatinine 0.90 0.50 - 1.40 mg/dL    Calcium 9.2 8.5 - 10.5 mg/dL    AST(SGOT) 39 12 - 45 U/L    ALT(SGPT) 48 2 - 50 U/L    Alkaline Phosphatase 99 30 - 99 U/L    Total Bilirubin 0.2 0.1 - 1.5 mg/dL    Albumin 3.2 3.2 - 4.9  g/dL    Total Protein 6.5 6.0 - 8.2 g/dL    Globulin 3.3 1.9 - 3.5 g/dL    A-G Ratio 1.0 g/dL   CBC with Differential    Collection Time: 10/11/22  8:32 PM   Result Value Ref Range    WBC 9.6 4.8 - 10.8 K/uL    RBC 4.09 (L) 4.20 - 5.40 M/uL    Hemoglobin 12.7 12.0 - 16.0 g/dL    Hematocrit 37.2 37.0 - 47.0 %    MCV 91.0 81.4 - 97.8 fL    MCH 31.1 27.0 - 33.0 pg    MCHC 34.1 33.6 - 35.0 g/dL    RDW 44.3 35.9 - 50.0 fL    Platelet Count 257 164 - 446 K/uL    MPV 10.5 9.0 - 12.9 fL    Neutrophils-Polys 65.10 44.00 - 72.00 %    Lymphocytes 20.20 (L) 22.00 - 41.00 %    Monocytes 11.70 0.00 - 13.40 %    Eosinophils 2.00 0.00 - 6.90 %    Basophils 0.50 0.00 - 1.80 %    Immature Granulocytes 0.50 0.00 - 0.90 %    Nucleated RBC 0.00 /100 WBC    Neutrophils (Absolute) 6.26 2.00 - 7.15 K/uL    Lymphs (Absolute) 1.94 1.00 - 4.80 K/uL    Monos (Absolute) 1.12 (H) 0.00 - 0.85 K/uL    Eos (Absolute) 0.19 0.00 - 0.51 K/uL    Baso (Absolute) 0.05 0.00 - 0.12 K/uL    Immature Granulocytes (abs) 0.05 0.00 - 0.11 K/uL    NRBC (Absolute) 0.00 K/uL   Prothrombin time (INR)    Collection Time: 10/11/22  8:32 PM   Result Value Ref Range    PT 13.8 12.0 - 14.6 sec    INR 1.07 0.87 - 1.13   APTT (PTT)    Collection Time: 10/11/22  8:32 PM   Result Value Ref Range    APTT 53.2 (H) 24.7 - 36.0 sec   COD (Adult)    Collection Time: 10/11/22  8:32 PM   Result Value Ref Range    ABO Grouping Only A     Rh Grouping Only POS     Antibody Screen-Cod NEG    ESTIMATED GFR    Collection Time: 10/11/22  8:32 PM   Result Value Ref Range    GFR (CKD-EPI) 75 >60 mL/min/1.73 m 2   POCT glucose device results    Collection Time: 10/11/22  9:15 PM   Result Value Ref Range    POC Glucose, Blood 125 (H) 65 - 99 mg/dL   Urinalysis    Collection Time: 10/11/22 11:50 PM    Specimen: Urine, Cath   Result Value Ref Range    Color Yellow     Character Clear     Specific Gravity 1.008 <1.035    Ph 6.0 5.0 - 8.0    Glucose Negative Negative mg/dL    Ketones Negative  Negative mg/dL    Protein Negative Negative mg/dL    Bilirubin Negative Negative    Urobilinogen, Urine 0.2 Negative    Nitrite Negative Negative    Leukocyte Esterase Small (A) Negative    Occult Blood Negative Negative    Micro Urine Req Microscopic    URINE MICROSCOPIC (W/UA)    Collection Time: 10/11/22 11:50 PM   Result Value Ref Range    WBC 5-10 (A) /hpf    RBC 0-2 /hpf    Bacteria Few (A) None /hpf    Epithelial Cells Few /hpf    Hyaline Cast 0-2 /lpf   Heparin Anti-Xa    Collection Time: 10/12/22 12:40 AM   Result Value Ref Range    Heparin Xa (UFH) 0.17 IU/mL   POCT glucose device results    Collection Time: 10/12/22  3:23 AM   Result Value Ref Range    POC Glucose, Blood 110 (H) 65 - 99 mg/dL   POCT glucose device results    Collection Time: 10/12/22  8:49 AM   Result Value Ref Range    POC Glucose, Blood 105 (H) 65 - 99 mg/dL   POCT activated clotting time device results    Collection Time: 10/12/22  8:50 AM   Result Value Ref Range    Istat Activated Clotting Time 127 74 - 137 sec   POCT arterial blood gas device results    Collection Time: 10/12/22  8:51 AM   Result Value Ref Range    Ph 7.385 (L) 7.400 - 7.500    Pco2 40.3 (H) 26.0 - 37.0 mmHg    Po2 330 (H) 64 - 87 mmHg    Tco2 25 20 - 33 mmol/L    S02 100 (H) 93 - 99 %    Hco3 24.1 17.0 - 25.0 mmol/L    BE -1 -4 - 3 mmol/L    Body Temp 37.0 C degrees    Ph Temp Lona 7.385 (L) 7.400 - 7.500    Pco2 Temp Co 40.3 (H) 26.0 - 37.0 mmHg    Po2 Temp Cor 330 (H) 64 - 87 mmHg    Specimen Arterial    POCT sodium device results    Collection Time: 10/12/22  8:51 AM   Result Value Ref Range    Istat Sodium 138 135 - 145 mmol/L   POCT potassium device results    Collection Time: 10/12/22  8:51 AM   Result Value Ref Range    Istat Potassium 4.1 3.6 - 5.5 mmol/L   POCT ionized CA device results    Collection Time: 10/12/22  8:51 AM   Result Value Ref Range    Istat Ionized Calcium 1.32 (H) 1.10 - 1.30 mmol/L   POCT hematocrit and hemoglobin device results     Collection Time: 10/12/22  8:51 AM   Result Value Ref Range    Istat Hematocrit 35 (L) 37 - 47 %    Istat Hemoglobin 11.9 (L) 12.0 - 16.0 g/dL   POCT glucose device results    Collection Time: 10/12/22 10:00 AM   Result Value Ref Range    POC Glucose, Blood 118 (H) 65 - 99 mg/dL   POCT activated clotting time device results    Collection Time: 10/12/22 10:02 AM   Result Value Ref Range    Istat Activated Clotting Time 613 (H) 74 - 137 sec   POCT arterial blood gas device results    Collection Time: 10/12/22 10:03 AM   Result Value Ref Range    Ph 7.354 (L) 7.400 - 7.500    Pco2 39.7 (H) 26.0 - 37.0 mmHg    Po2 208 (H) 64 - 87 mmHg    Tco2 23 20 - 33 mmol/L    S02 100 (H) 93 - 99 %    Hco3 22.1 17.0 - 25.0 mmol/L    BE -3 -4 - 3 mmol/L    Body Temp 36.3 C degrees    Ph Temp Lona 7.364 (L) 7.400 - 7.500    Pco2 Temp Co 38.5 (H) 26.0 - 37.0 mmHg    Po2 Temp Cor 205 (H) 64 - 87 mmHg    Specimen Arterial    POCT sodium device results    Collection Time: 10/12/22 10:03 AM   Result Value Ref Range    Istat Sodium 137 135 - 145 mmol/L   POCT potassium device results    Collection Time: 10/12/22 10:03 AM   Result Value Ref Range    Istat Potassium 4.4 3.6 - 5.5 mmol/L   POCT ionized CA device results    Collection Time: 10/12/22 10:03 AM   Result Value Ref Range    Istat Ionized Calcium 1.25 1.10 - 1.30 mmol/L   POCT hematocrit and hemoglobin device results    Collection Time: 10/12/22 10:03 AM   Result Value Ref Range    Istat Hematocrit 33 (L) 37 - 47 %    Istat Hemoglobin 11.2 (L) 12.0 - 16.0 g/dL   POCT glucose device results    Collection Time: 10/12/22 10:41 AM   Result Value Ref Range    POC Glucose, Blood 118 (H) 65 - 99 mg/dL   POCT activated clotting time device results    Collection Time: 10/12/22 10:43 AM   Result Value Ref Range    Istat Activated Clotting Time 607 (H) 74 - 137 sec   POCT venous blood gas device results    Collection Time: 10/12/22 10:43 AM   Result Value Ref Range    Ph 7.402 7.310 - 7.450     Pco2 38.9 (L) 41.0 - 51.0 mmHg    Po2 44 (H) 25 - 40 mmHg    Tco2 25 20 - 33 mmol/L    SO2 80 %    Hco3 24.2 24.0 - 28.0 mmol/L    BE 0 -4 - 3 mmol/L    Body Temp 35.2 C degrees    Ph Temp Correc 7.429 7.310 - 7.450    Pco2 Temp Lona 36.0 (L) 41.0 - 51.0 mmHg    Po2 Temp Corre 39 25 - 40 mmHg    Specimen Venous    POCT sodium device results    Collection Time: 10/12/22 10:43 AM   Result Value Ref Range    Istat Sodium 136 135 - 145 mmol/L   POCT potassium device results    Collection Time: 10/12/22 10:43 AM   Result Value Ref Range    Istat Potassium 4.7 3.6 - 5.5 mmol/L   POCT ionized CA device results    Collection Time: 10/12/22 10:43 AM   Result Value Ref Range    Istat Ionized Calcium 0.97 (L) 1.10 - 1.30 mmol/L   POCT hematocrit and hemoglobin device results    Collection Time: 10/12/22 10:43 AM   Result Value Ref Range    Istat Hematocrit 21 (L) 37 - 47 %    Istat Hemoglobin 7.1 (L) 12.0 - 16.0 g/dL   POCT glucose device results    Collection Time: 10/12/22 10:53 AM   Result Value Ref Range    POC Glucose, Blood 113 (H) 65 - 99 mg/dL   POCT activated clotting time device results    Collection Time: 10/12/22 10:55 AM   Result Value Ref Range    Istat Activated Clotting Time 602 (H) 74 - 137 sec   POCT arterial blood gas device results    Collection Time: 10/12/22 10:55 AM   Result Value Ref Range    Ph 7.429 7.400 - 7.500    Pco2 39.6 (H) 26.0 - 37.0 mmHg    Po2 368 (H) 64 - 87 mmHg    Tco2 27 20 - 33 mmol/L    S02 100 (H) 93 - 99 %    Hco3 26.2 (H) 17.0 - 25.0 mmol/L    BE 2 -4 - 3 mmol/L    Body Temp 35.0 C degrees    Ph Temp Lona 7.458 7.400 - 7.500    Pco2 Temp Co 36.3 26.0 - 37.0 mmHg    Po2 Temp Cor 358 (H) 64 - 87 mmHg    Specimen Arterial    POCT sodium device results    Collection Time: 10/12/22 10:55 AM   Result Value Ref Range    Istat Sodium 135 135 - 145 mmol/L   POCT potassium device results    Collection Time: 10/12/22 10:55 AM   Result Value Ref Range    Istat Potassium 5.0 3.6 - 5.5 mmol/L    POCT ionized CA device results    Collection Time: 10/12/22 10:55 AM   Result Value Ref Range    Istat Ionized Calcium 1.01 (L) 1.10 - 1.30 mmol/L   POCT hematocrit and hemoglobin device results    Collection Time: 10/12/22 10:55 AM   Result Value Ref Range    Istat Hematocrit 24 (L) 37 - 47 %    Istat Hemoglobin 8.2 (L) 12.0 - 16.0 g/dL   POCT glucose device results    Collection Time: 10/12/22 11:09 AM   Result Value Ref Range    POC Glucose, Blood 126 (H) 65 - 99 mg/dL   POCT activated clotting time device results    Collection Time: 10/12/22 11:11 AM   Result Value Ref Range    Istat Activated Clotting Time 526 (H) 74 - 137 sec   POCT arterial blood gas device results    Collection Time: 10/12/22 11:12 AM   Result Value Ref Range    Ph 7.404 7.400 - 7.500    Pco2 41.4 (H) 26.0 - 37.0 mmHg    Po2 328 (H) 64 - 87 mmHg    Tco2 27 20 - 33 mmol/L    S02 100 (H) 93 - 99 %    Hco3 25.9 (H) 17.0 - 25.0 mmol/L    BE 1 -4 - 3 mmol/L    Body Temp 36.1 C degrees    Ph Temp Lona 7.418 7.400 - 7.500    Pco2 Temp Co 39.8 (H) 26.0 - 37.0 mmHg    Po2 Temp Cor 324 (H) 64 - 87 mmHg    Specimen Arterial    POCT sodium device results    Collection Time: 10/12/22 11:12 AM   Result Value Ref Range    Istat Sodium 136 135 - 145 mmol/L   POCT potassium device results    Collection Time: 10/12/22 11:12 AM   Result Value Ref Range    Istat Potassium 4.9 3.6 - 5.5 mmol/L   POCT ionized CA device results    Collection Time: 10/12/22 11:12 AM   Result Value Ref Range    Istat Ionized Calcium 1.06 (L) 1.10 - 1.30 mmol/L   POCT hematocrit and hemoglobin device results    Collection Time: 10/12/22 11:12 AM   Result Value Ref Range    Istat Hematocrit 25 (L) 37 - 47 %    Istat Hemoglobin 8.5 (L) 12.0 - 16.0 g/dL   POCT glucose device results    Collection Time: 10/12/22 11:50 AM   Result Value Ref Range    POC Glucose, Blood 130 (H) 65 - 99 mg/dL   POCT activated clotting time device results    Collection Time: 10/12/22 11:51 AM   Result Value  Ref Range    Istat Activated Clotting Time 127 74 - 137 sec   POCT arterial blood gas device results    Collection Time: 10/12/22 11:52 AM   Result Value Ref Range    Ph 7.376 (L) 7.400 - 7.500    Pco2 37.8 (H) 26.0 - 37.0 mmHg    Po2 116 (H) 64 - 87 mmHg    Tco2 23 20 - 33 mmol/L    S02 98 93 - 99 %    Hco3 22.2 17.0 - 25.0 mmol/L    BE -3 -4 - 3 mmol/L    Body Temp 37.0 C degrees    Ph Temp Lona 7.376 (L) 7.400 - 7.500    Pco2 Temp Co 37.8 (H) 26.0 - 37.0 mmHg    Po2 Temp Cor 116 (H) 64 - 87 mmHg    Specimen Arterial    POCT sodium device results    Collection Time: 10/12/22 11:52 AM   Result Value Ref Range    Istat Sodium 135 135 - 145 mmol/L   POCT potassium device results    Collection Time: 10/12/22 11:52 AM   Result Value Ref Range    Istat Potassium 4.7 3.6 - 5.5 mmol/L   POCT ionized CA device results    Collection Time: 10/12/22 11:52 AM   Result Value Ref Range    Istat Ionized Calcium 1.44 (H) 1.10 - 1.30 mmol/L   POCT hematocrit and hemoglobin device results    Collection Time: 10/12/22 11:52 AM   Result Value Ref Range    Istat Hematocrit 24 (L) 37 - 47 %    Istat Hemoglobin 8.2 (L) 12.0 - 16.0 g/dL   Platelet count    Collection Time: 10/12/22 12:41 PM   Result Value Ref Range    Platelet Count 165 164 - 446 K/uL   Magnesium    Collection Time: 10/12/22 12:41 PM   Result Value Ref Range    Magnesium 2.8 (H) 1.5 - 2.5 mg/dL   Prothrombin time (INR)    Collection Time: 10/12/22 12:41 PM   Result Value Ref Range    PT 15.8 (H) 12.0 - 14.6 sec    INR 1.28 (H) 0.87 - 1.13   APTT (PTT)    Collection Time: 10/12/22 12:41 PM   Result Value Ref Range    APTT 32.6 24.7 - 36.0 sec   POTASSIUM SERUM (K)    Collection Time: 10/12/22 12:41 PM   Result Value Ref Range    Potassium 4.7 3.6 - 5.5 mmol/L   POCT arterial blood gas device results    Collection Time: 10/12/22 12:44 PM   Result Value Ref Range    Ph 7.422 7.400 - 7.500    Pco2 34.0 26.0 - 37.0 mmHg    Po2 94 (H) 64 - 87 mmHg    Tco2 23 20 - 33 mmol/L     S02 98 93 - 99 %    Hco3 22.1 17.0 - 25.0 mmol/L    BE -2 -4 - 3 mmol/L    Body Temp 36.2 C degrees    O2 Therapy 100 %    iPF Ratio 94     Ph Temp Lona 7.433 7.400 - 7.500    Pco2 Temp Co 32.8 26.0 - 37.0 mmHg    Po2 Temp Cor 89 (H) 64 - 87 mmHg    Specimen Arterial    POCT sodium device results    Collection Time: 10/12/22 12:44 PM   Result Value Ref Range    Istat Sodium 137 135 - 145 mmol/L   POCT potassium device results    Collection Time: 10/12/22 12:44 PM   Result Value Ref Range    Istat Potassium 4.5 3.6 - 5.5 mmol/L   POCT ionized CA device results    Collection Time: 10/12/22 12:44 PM   Result Value Ref Range    Istat Ionized Calcium 1.39 (H) 1.10 - 1.30 mmol/L   POCT hematocrit and hemoglobin device results    Collection Time: 10/12/22 12:44 PM   Result Value Ref Range    Istat Hematocrit 28 (L) 37 - 47 %    Istat Hemoglobin 9.5 (L) 12.0 - 16.0 g/dL   EKG on arrival to CSU    Collection Time: 10/12/22 12:58 PM   Result Value Ref Range    Report       [facility]    Test Date:  2022-10-12  Pt Name:    PATEL BAUTISTA       Department: [facility]  MRN:        [id number]                      Room:       [facility]  Gender:     Female                       Technician: ROWAN  :        1965                   Requested By:VIPUL ORTIZ  Order #:    [id number]                    Reading MD:    Measurements  Intervals                                Axis  Rate:       89                           P:          66  TX:         177                          QRS:        75  QRSD:       85                           T:          81  QT:         380  QTc:        463    Interpretive Statements  Sinus rhythm  Inferior infarct, acute (RCA)  Probable anteroseptal infarct, old  Lateral leads are also involved  Probable RV involvement, suggest recording right precordial leads  Compared to ECG 10/11/2022 20:00:23  No significant changes     POCT arterial blood gas device results    Collection Time: 10/12/22  1:25 PM    Result Value Ref Range    Ph 7.457 7.400 - 7.500    Pco2 29.1 26.0 - 37.0 mmHg    Po2 62 (L) 64 - 87 mmHg    Tco2 21 20 - 33 mmol/L    S02 93 93 - 99 %    Hco3 20.5 17.0 - 25.0 mmol/L    BE -3 -4 - 3 mmol/L    Body Temp 35.8 C degrees    O2 Therapy 60 %    iPF Ratio 103     Ph Temp Lona 7.475 7.400 - 7.500    Pco2 Temp Co 27.6 26.0 - 37.0 mmHg    Po2 Temp Cor 57 (L) 64 - 87 mmHg    Specimen Arterial     DelSys Vent     Peep End Expiratory Pressure 8 cmh20    Percent Minute Volume 160     Mode ASV    POCT arterial blood gas device results    Collection Time: 10/12/22  2:31 PM   Result Value Ref Range    Ph 7.427 7.400 - 7.500    Pco2 32.0 26.0 - 37.0 mmHg    Po2 84 64 - 87 mmHg    Tco2 22 20 - 33 mmol/L    S02 97 93 - 99 %    Hco3 21.1 17.0 - 25.0 mmol/L    BE -3 -4 - 3 mmol/L    Body Temp 35.9 C degrees    O2 Therapy 80 %    iPF Ratio 105     Ph Temp Lona 7.443 7.400 - 7.500    Pco2 Temp Co 30.5 26.0 - 37.0 mmHg    Po2 Temp Cor 78 64 - 87 mmHg    Specimen Arterial     DelSys Vent     Peep End Expiratory Pressure 8 cmh20    Percent Minute Volume 130     Mode ASV        Imaging  EC-BEVERLY W/O CONT         DX-CHEST-PORTABLE (1 VIEW)   Final Result      Postop chest with mild interstitial edema. No focal consolidation. No effusions..         EC-ECHOCARDIOGRAM LTD W/ CONT   Final Result      US-CAROTID DOPPLER BILAT   Final Result      US-VEIN MAPPING LOWER EXTREMITY BILAT   Final Result      EC-ECHOCARDIOGRAM COMPLETE W/ CONT   Final Result      DX-CHEST-PORTABLE (1 VIEW)   Final Result         1.  No acute cardiopulmonary disease.      CL-LEFT HEART CATHETERIZATION WITH POSSIBLE INTERVENTION    (Results Pending)       Assessment/Plan  NSTEMI (non-ST elevated myocardial infarction) (HCC)  Assessment & Plan  Patient presented 10/6 with hypertensive emergency increasing troponin chest pain, no ST changes however troponin continued to rise.  Echo showed septal wall motion abnormalities with apical hypokinesis was taken to  cath 10/9 where he was found to have multivessel disease.  Also was noted to have LVOT  Underwent CABG today  Continue ASA, Plavix and statin.   Continue Coreg  Resume ARB and HCTZ if remains stable in a.m.    S/P CABG x 2  Assessment & Plan  Status post CABG 10/12  :Coronary artery bypass grafting x2  Left internal mammary artery to left anterior descending artery  Reversed saphenous vein graft to postero-lateral artery  Endo-vein procurement    Following CVT protocol post CABG for vent liberation, ICU team available to help with any difficulties with vent liberation changes in hemodynamics and continued monitoring post surgery    Hypokalemia  Assessment & Plan  Pleat for K greater than 4 mag greater than 2    Hypertensive urgency- (present on admission)  Assessment & Plan  Resolved, on clevidipine post CABG for blood pressure control     Hypothyroidism, acquired, autoimmune- (present on admission)  Assessment & Plan  Continue levothyroxine    Essential hypertension- (present on admission)  Assessment & Plan  On home losartan/HCTZ -holding postprocedural, can restart if continued hypertension without problems in a.m.  On Cleviprex  Continue low-dose Coreg    Discussed patient condition and risk of morbidity and/or mortality with RN, RT, and CVS.    The patient remains critically ill.  Critical care time = 34 minutes in directly providing and coordinating critical care and extensive data review.  No time overlap and excludes procedures.

## 2022-10-12 NOTE — ANESTHESIA PROCEDURE NOTES
BEVERLY    Date/Time: 10/12/2022 8:31 AM  Performed by: Brad Rebollar M.D.  Authorized by: Brad Rebollar M.D.     Start Time:10/12/2022 8:31 AM  Preanesthetic Checklist: patient identified, IV checked, site marked, risks and benefits discussed, surgical consent, monitors and equipment checked, pre-op evaluation and timeout performed    Indication for BEVERLY: diagnostic   Patient Location: OR  Intubated: Yes  Bite Block: Yes  Heart Visualized: Yes  Insertion: atraumatic    **See FULL BEVERLY report in patient's chart via CV Synapse**

## 2022-10-12 NOTE — ASSESSMENT & PLAN NOTE
Status post CABG 10/12  :Coronary artery bypass grafting x2  Left internal mammary artery to left anterior descending artery  Reversed saphenous vein graft to postero-lateral artery  Endo-vein procurement    Following CVT protocol post CABG for vent liberation, ICU team available to help with any difficulties with vent liberation changes in hemodynamics and continued monitoring post surgery

## 2022-10-12 NOTE — PROGRESS NOTES
Report received from Renetta Womack. Assumed pt care. Pt visiting with family. Pt A&O x 4. Fall precautions in place, call light and belongings within reach, bed in lowest position. No signs of distress.

## 2022-10-12 NOTE — ANESTHESIA PROCEDURE NOTES
Arterial Line  Performed by: Brad Rebollar M.D.  Authorized by: Brad Rebollar M.D.     Start Time:  10/12/2022 8:22 AM  End Time:  10/12/2022 8:24 AM  Localization: ultrasound guidance and surface landmarks    Patient Location:  OR  Indication: continuous blood pressure monitoring        Catheter Size:  20 G  Seldinger Technique?: Yes    Laterality:  Left  Site:  Radial artery  Line Secured:  Antimicrobial disc, tape and transparent dressing  Events: patient tolerated procedure well with no complications

## 2022-10-12 NOTE — INTERVAL H&P NOTE
H&P reviewed. The patient was examined and there are no changes to the H&P    Carotid US: FINDINGS   Right.    Heterogeneous plaque in the proximal internal carotid artery. <50% internal    carotid artery stenosis.       Left.    Smooth plaque in the proximal internal carotid artery. <50% internal    carotid artery stenosis.       The bilateral subclavian and vertebral artery waveforms are antegrade and    normal in character and velocity.     Repeat Echo to evaluate LVOT:  CONCLUSIONS  The septum is sigmoid in appearance, but without a significant LVOT   gradient at baseline or with Valsalva.

## 2022-10-12 NOTE — ANESTHESIA PROCEDURE NOTES
Central Venous Line  Performed by: Brad Rebollar M.D.  Authorized by: Brad Rebollar M.D.     Start Time:  10/12/2022 8:32 AM  End Time:  10/12/2022 8:40 AM  Patient Location:  OR  Indication: central venous access        provider hand hygiene performed prior to central venous catheter insertion, all 5 sterile barriers used (gloves, gown, cap, mask, large sterile drape) during central venous catheter insertion and skin prep agent completely dried prior to procedure    Patient Position:  Trendelenburg  Laterality:  Right  Site:  Internal jugular  Prep:  Chlorhexidine  Catheter Size:  8 Fr  Catheter Length (cm):  16  Number of Lumens:  Double lumen  target vein identified, needle advanced into vein and blood aspirated and guidewire advanced into vein    Seldinger Technique?: Yes    Ultrasound-Guided: ultrasound-guided  Image captured, interpreted and electronically stored.  Sterile Gel and Probe Cover Used for Ultrasound?: Yes    Intravenous Verification: verified by ultrasound, venous blood return and chest x-ray pending    all ports aspirated, all ports flushed easily, guidewire was removed intact, biopatch was applied, line was sutured in place and dressing was applied    Events: patient tolerated procedure well with no complications    PA Catheter Placed?: No

## 2022-10-12 NOTE — ANESTHESIA TIME REPORT
Anesthesia Start and Stop Event Times     Date Time Event    10/12/2022 0800 Ready for Procedure     0810 Anesthesia Start     1240 Anesthesia Stop        Responsible Staff  10/12/22    Name Role Begin End    Brad Rebollar M.D. Anesth 0810 1240        Overtime Reason:  no overtime (within assigned shift)    Comments:

## 2022-10-12 NOTE — ANESTHESIA PROCEDURE NOTES
Airway    Date/Time: 10/12/2022 8:30 AM  Performed by: Brad Rebollar M.D.  Authorized by: Brad Rebollar M.D.     Location:  OR  Urgency:  Elective  Difficult Airway: No    Indications for Airway Management:  Anesthesia      Spontaneous Ventilation: absent    Sedation Level:  Deep  Preoxygenated: Yes    Patient Position:  Sniffing  Mask Difficulty Assessment:  2 - vent by mask + OA or adjuvant +/- NMBA  Final Airway Type:  Endotracheal airway  Final Endotracheal Airway:  ETT  Cuffed: Yes    Technique Used for Successful ETT Placement:  Direct laryngoscopy    Insertion Site:  Oral  Blade Type:  Escoto  Laryngoscope Blade/Videolaryngoscope Blade Size:  2  ETT Size (mm):  7.5  Measured from:  Teeth  ETT to Teeth (cm):  22  Placement Verified by: auscultation and capnometry    Cormack-Lehane Classification:  Grade IIa - partial view of glottis  Number of Attempts at Approach:  1

## 2022-10-12 NOTE — PROGRESS NOTES
Per pre op checklist:  FSBG check 110 mg/dL  Pt shaved from chin to toes and 2 CHG baths complete.  Apple juice given to pt at 0455  Consent for blood and surgery signed by pt.   Heparin gtt stopped at 0335 see MAR

## 2022-10-12 NOTE — ANESTHESIA PREPROCEDURE EVALUATION
Case: [id number] Anesthesia Start Date/Time: 10/12/22 0810    Procedures:       CORONARY ARTERY BYPASS GRAFTING X2, ENDOSCOPIC VEIN HARVEST, TRANSESOPHAGEAL ECHOCARDIOGRAM      ECHOCARDIOGRAM, TRANSESOPHAGEAL    [facility]    Surgeons: Coretta Smyth M.D.          Relevant Problems   CARDIAC   (positive) Essential hypertension   (positive) Hypertensive urgency   (positive) SVT (supraventricular tachycardia) (HCC)      ENDO   (positive) Hypothyroid   (positive) Hypothyroidism, acquired, autoimmune       Physical Exam    Airway   Mallampati: III  TM distance: <3 FB  Neck ROM: full       Cardiovascular - normal exam  Rhythm: regular  Rate: normal  (-) murmur     Dental - normal exam           Pulmonary - normal exam  Breath sounds clear to auscultation     Abdominal   (+) obese     Neurological - normal exam                 Anesthesia Plan    ASA 4       Plan - general       Airway plan will be ETT  BEVERLY Planned  (NSTEMI, preserved EF for CABG x 2)      Induction: intravenous    Postoperative Plan: Postoperative administration of opioids is intended.    Pertinent diagnostic labs and testing reviewed    Informed Consent:    Anesthetic plan and risks discussed with patient.    Use of blood products discussed with: patient whom consented to blood products.

## 2022-10-12 NOTE — PROGRESS NOTES
Pt arrived to unit T619 at 2130 via wheelchair on monitor and on RA, with this RN and CCT.   Bilateral BP RUE: 138/67 LUE: 146/77  Baseline IS: 2500  Weight 93.7kg via stand up scale

## 2022-10-13 ENCOUNTER — APPOINTMENT (OUTPATIENT)
Dept: RADIOLOGY | Facility: MEDICAL CENTER | Age: 57
DRG: 233 | End: 2022-10-13
Attending: NURSE PRACTITIONER
Payer: COMMERCIAL

## 2022-10-13 LAB
ANION GAP SERPL CALC-SCNC: 8 MMOL/L (ref 7–16)
BUN SERPL-MCNC: 11 MG/DL (ref 8–22)
CALCIUM SERPL-MCNC: 8.1 MG/DL (ref 8.5–10.5)
CHLORIDE SERPL-SCNC: 110 MMOL/L (ref 96–112)
CO2 SERPL-SCNC: 21 MMOL/L (ref 20–33)
CREAT SERPL-MCNC: 0.63 MG/DL (ref 0.5–1.4)
ERYTHROCYTE [DISTWIDTH] IN BLOOD BY AUTOMATED COUNT: 43.9 FL (ref 35.9–50)
GFR SERPLBLD CREATININE-BSD FMLA CKD-EPI: 104 ML/MIN/1.73 M 2
GLUCOSE BLD STRIP.AUTO-MCNC: 144 MG/DL (ref 65–99)
GLUCOSE SERPL-MCNC: 112 MG/DL (ref 65–99)
HCT VFR BLD AUTO: 28.5 % (ref 37–47)
HGB BLD-MCNC: 9.8 G/DL (ref 12–16)
LV EJECT FRACT  99904: 40
LV EJECT FRACT MOD 2C 99903: 43.72
LV EJECT FRACT MOD 4C 99902: -0.94
LV EJECT FRACT MOD BP 99901: 27.95
MCH RBC QN AUTO: 31 PG (ref 27–33)
MCHC RBC AUTO-ENTMCNC: 34.4 G/DL (ref 33.6–35)
MCV RBC AUTO: 90.2 FL (ref 81.4–97.8)
PLATELET # BLD AUTO: 177 K/UL (ref 164–446)
PMV BLD AUTO: 10.8 FL (ref 9–12.9)
POTASSIUM SERPL-SCNC: 4.5 MMOL/L (ref 3.6–5.5)
POTASSIUM SERPL-SCNC: 4.5 MMOL/L (ref 3.6–5.5)
RBC # BLD AUTO: 3.16 M/UL (ref 4.2–5.4)
SODIUM SERPL-SCNC: 139 MMOL/L (ref 135–145)
WBC # BLD AUTO: 6.6 K/UL (ref 4.8–10.8)

## 2022-10-13 PROCEDURE — 770022 HCHG ROOM/CARE - ICU (200)

## 2022-10-13 PROCEDURE — 700102 HCHG RX REV CODE 250 W/ 637 OVERRIDE(OP): Performed by: NURSE PRACTITIONER

## 2022-10-13 PROCEDURE — 36415 COLL VENOUS BLD VENIPUNCTURE: CPT

## 2022-10-13 PROCEDURE — 93005 ELECTROCARDIOGRAM TRACING: CPT | Performed by: NURSE PRACTITIONER

## 2022-10-13 PROCEDURE — 93308 TTE F-UP OR LMTD: CPT | Mod: 26 | Performed by: INTERNAL MEDICINE

## 2022-10-13 PROCEDURE — A9270 NON-COVERED ITEM OR SERVICE: HCPCS | Performed by: NURSE PRACTITIONER

## 2022-10-13 PROCEDURE — 80048 BASIC METABOLIC PNL TOTAL CA: CPT

## 2022-10-13 PROCEDURE — 85027 COMPLETE CBC AUTOMATED: CPT

## 2022-10-13 PROCEDURE — A9270 NON-COVERED ITEM OR SERVICE: HCPCS | Performed by: STUDENT IN AN ORGANIZED HEALTH CARE EDUCATION/TRAINING PROGRAM

## 2022-10-13 PROCEDURE — 99024 POSTOP FOLLOW-UP VISIT: CPT | Performed by: NURSE PRACTITIONER

## 2022-10-13 PROCEDURE — 700102 HCHG RX REV CODE 250 W/ 637 OVERRIDE(OP): Performed by: STUDENT IN AN ORGANIZED HEALTH CARE EDUCATION/TRAINING PROGRAM

## 2022-10-13 PROCEDURE — 93325 DOPPLER ECHO COLOR FLOW MAPG: CPT | Mod: 26 | Performed by: INTERNAL MEDICINE

## 2022-10-13 PROCEDURE — 700111 HCHG RX REV CODE 636 W/ 250 OVERRIDE (IP): Performed by: NURSE PRACTITIONER

## 2022-10-13 PROCEDURE — 82962 GLUCOSE BLOOD TEST: CPT

## 2022-10-13 PROCEDURE — 71045 X-RAY EXAM CHEST 1 VIEW: CPT

## 2022-10-13 PROCEDURE — 94669 MECHANICAL CHEST WALL OSCILL: CPT

## 2022-10-13 PROCEDURE — 84132 ASSAY OF SERUM POTASSIUM: CPT

## 2022-10-13 RX ORDER — FUROSEMIDE 10 MG/ML
20 INJECTION INTRAMUSCULAR; INTRAVENOUS
Status: DISCONTINUED | OUTPATIENT
Start: 2022-10-13 | End: 2022-10-18 | Stop reason: HOSPADM

## 2022-10-13 RX ORDER — POTASSIUM CHLORIDE 20 MEQ/1
10 TABLET, EXTENDED RELEASE ORAL DAILY
Status: DISCONTINUED | OUTPATIENT
Start: 2022-10-13 | End: 2022-10-13

## 2022-10-13 RX ORDER — POTASSIUM CHLORIDE 20 MEQ/1
20 TABLET, EXTENDED RELEASE ORAL DAILY
Status: DISCONTINUED | OUTPATIENT
Start: 2022-10-13 | End: 2022-10-13

## 2022-10-13 RX ORDER — FUROSEMIDE 10 MG/ML
40 INJECTION INTRAMUSCULAR; INTRAVENOUS ONCE
Status: COMPLETED | OUTPATIENT
Start: 2022-10-13 | End: 2022-10-13

## 2022-10-13 RX ORDER — POTASSIUM CHLORIDE 20 MEQ/1
20 TABLET, EXTENDED RELEASE ORAL ONCE
Status: COMPLETED | OUTPATIENT
Start: 2022-10-13 | End: 2022-10-13

## 2022-10-13 RX ADMIN — OMEPRAZOLE 20 MG: 20 CAPSULE, DELAYED RELEASE ORAL at 05:42

## 2022-10-13 RX ADMIN — CLOPIDOGREL BISULFATE 75 MG: 75 TABLET ORAL at 05:41

## 2022-10-13 RX ADMIN — OXYCODONE 5 MG: 5 TABLET ORAL at 20:19

## 2022-10-13 RX ADMIN — FUROSEMIDE 20 MG: 10 INJECTION, SOLUTION INTRAMUSCULAR; INTRAVENOUS at 10:33

## 2022-10-13 RX ADMIN — ACETAMINOPHEN 1000 MG: 500 TABLET ORAL at 00:23

## 2022-10-13 RX ADMIN — FUROSEMIDE 40 MG: 10 INJECTION, SOLUTION INTRAMUSCULAR; INTRAVENOUS at 16:51

## 2022-10-13 RX ADMIN — TRAMADOL HYDROCHLORIDE 50 MG: 50 TABLET, COATED ORAL at 05:59

## 2022-10-13 RX ADMIN — OXYCODONE HYDROCHLORIDE 10 MG: 10 TABLET ORAL at 03:42

## 2022-10-13 RX ADMIN — ASPIRIN 81 MG: 81 TABLET, COATED ORAL at 05:41

## 2022-10-13 RX ADMIN — ACETAMINOPHEN 1000 MG: 500 TABLET ORAL at 23:55

## 2022-10-13 RX ADMIN — SENNOSIDES AND DOCUSATE SODIUM 2 TABLET: 50; 8.6 TABLET ORAL at 05:42

## 2022-10-13 RX ADMIN — LEVOTHYROXINE SODIUM 100 MCG: 0.1 TABLET ORAL at 05:41

## 2022-10-13 RX ADMIN — MAGNESIUM SULFATE HEPTAHYDRATE 1 G: 1 INJECTION, SOLUTION INTRAVENOUS at 05:42

## 2022-10-13 RX ADMIN — ACETAMINOPHEN 1000 MG: 500 TABLET ORAL at 12:40

## 2022-10-13 RX ADMIN — POTASSIUM CHLORIDE 20 MEQ: 1500 TABLET, EXTENDED RELEASE ORAL at 17:03

## 2022-10-13 RX ADMIN — POTASSIUM CHLORIDE 10 MEQ: 1500 TABLET, EXTENDED RELEASE ORAL at 10:33

## 2022-10-13 RX ADMIN — ACETAMINOPHEN 1000 MG: 500 TABLET ORAL at 05:41

## 2022-10-13 RX ADMIN — OXYCODONE 5 MG: 5 TABLET ORAL at 09:55

## 2022-10-13 RX ADMIN — ROSUVASTATIN CALCIUM 20 MG: 20 TABLET, FILM COATED ORAL at 20:20

## 2022-10-13 RX ADMIN — ACETAMINOPHEN 1000 MG: 500 TABLET ORAL at 17:03

## 2022-10-13 RX ADMIN — SENNOSIDES AND DOCUSATE SODIUM 2 TABLET: 50; 8.6 TABLET ORAL at 17:03

## 2022-10-13 ASSESSMENT — COPD QUESTIONNAIRES
HAVE YOU SMOKED AT LEAST 100 CIGARETTES IN YOUR ENTIRE LIFE: NO/DON'T KNOW
COPD SCREENING SCORE: 1
DO YOU EVER COUGH UP ANY MUCUS OR PHLEGM?: NO/ONLY WITH OCCASIONAL COLDS OR INFECTIONS
DURING THE PAST 4 WEEKS HOW MUCH DID YOU FEEL SHORT OF BREATH: NONE/LITTLE OF THE TIME

## 2022-10-13 ASSESSMENT — PAIN DESCRIPTION - PAIN TYPE
TYPE: SURGICAL PAIN

## 2022-10-13 ASSESSMENT — FIBROSIS 4 INDEX: FIB4 SCORE: 1.75

## 2022-10-13 ASSESSMENT — LIFESTYLE VARIABLES: EVER_SMOKED: NEVER

## 2022-10-13 NOTE — PROGRESS NOTES
Intensivist progress note  Patient doing well postprocedure, extubated overnight.  Off all drips.  Did have echo and EKG done overnight, discussed with Dr. Smyth at bedside very likely pericarditis postprocedure.  Labs stable mild drop in hemoglobin being monitored by CVT.     No further need for intensivist follow as patient is extubated and off all pressors, will sign off at this time. If any changes in clinical status, or concerns please feel free to reconsult intensivist team

## 2022-10-13 NOTE — DISCHARGE PLANNING
Case Management Discharge Planning    Admission Date: 10/6/2022  GMLOS: 11.3  ALOS: 7    6-Clicks ADL Score: 24  6-Clicks Mobility Score: 24      Anticipated Discharge Dispo: Discharge Disposition: Discharged to home/self care (01)    DME Needed: No    Action(s) Taken: DC Assessment Complete (See below); CM met with patient to discuss discharge patient family in the room with her. Patient verbalizes she has been independent with no assist and has no DME. Patient does have a 2 story home but does not have to go upstairs for anything she just mainly stays downstairs. Patient wants to discharge to home once she is medically stable. Family will stay with her once she gets home.     Escalations Completed: None    Medically Clear: No    Next Steps: follow for discharge needs.     Barriers to Discharge: Medical clearance    Is the patient up for discharge tomorrow: No

## 2022-10-13 NOTE — CARE PLAN
Problem: Post Op Day 1 CABG/Heart Valve Replacement  Goal: Optimal care of the post op CABG/heart valve replacement Post Op Day 1  Intervention: EKG and CXR completed  Note: Completed  Intervention: All valve patients: PT/INR daily  Note: NA  Intervention: Antibiotics are discontinued within 24 hours of anesthesia end time unless indication documented for continuation beyond 24 hours  Note: Completed  Intervention: Daily weights in the morning  Note: Completed  Intervention: Up in chair for all meals  Note: Up to chair  Intervention: Ensure referal to intensive cardiac rehab is ordered, and smoking cessation education if appropriate  Note: Ordered  Intervention: Knee high CHRISTIANNE hose, on during the day, off at night  Note: CHRISTIANNE hose on when up to chair  Intervention: Saline lock IV  Note: completed  Intervention: Transfer to [facility] status, begin VS q 4 hours  Note: Completed

## 2022-10-13 NOTE — PROGRESS NOTES
Cardiovascular Surgery Progress Note    Name: Clary Bah  MRN: [id number]  : 1965  Admit Date: 10/6/2022  9:17 PM  1 Day Post-Op     Procedure:  Procedure(s) and Anesthesia Type:     * CORONARY ARTERY BYPASS GRAFTING X2, ENDOSCOPIC VEIN HARVEST - General     * ECHOCARDIOGRAM, TRANSESOPHAGEAL - General    Vitals:  Vitals:    10/13/22 0600 10/13/22 0700 10/13/22 0800 10/13/22 0845   BP: 127/70 132/73 120/65 120/65   Pulse: 91 91 92 92   Resp: (!)    Temp:   37.2 °C (99 °F)    TempSrc:   Bladder    SpO2: 92% 95% 94% 95%   Weight: 97.7 kg (215 lb 6.2 oz)      Height:          Temp (24hrs), Av.1 °C (98.7 °F), Min:36.7 °C (98.1 °F), Max:37.3 °C (99.1 °F)      Respiratory:  Vent Mode: Spont, PEEP/CPAP: 8, PIP: 15, MAP: 10 Respiration: 14, Pulse Oximetry: 95 %       Fluids:    Intake/Output Summary (Last 24 hours) at 10/13/2022 0928  Last data filed at 10/13/2022 0800  Gross per 24 hour   Intake 3802 ml   Output 3430 ml   Net 372 ml     Admit weight: Weight: 113 kg (249 lb)  Current weight: Weight: 97.7 kg (215 lb 6.2 oz) (10/13/22 0600)    Labs:  Recent Labs     10/11/22  0032 10/11/22  2032 10/12/22  1241 10/13/22  0024   WBC 9.7 9.6  --  6.6   RBC 4.52 4.09*  --  3.16*   HEMOGLOBIN 14.0 12.7  --  9.8*   HEMATOCRIT 40.6 37.2  --  28.5*   MCV 89.8 91.0  --  90.2   MCH 31.0 31.1  --  31.0   MCHC 34.5 34.1  --  34.4   RDW 43.4 44.3  --  43.9   PLATELETCT 238 257 165 177   MPV 10.1 10.5  --  10.8     Recent Labs     10/11/22  0032 10/11/22  2032 10/12/22  1241 10/12/22  1700 10/13/22  0024 10/13/22  0615   SODIUM 134* 135  --   --  139  --    POTASSIUM 3.8 3.9   < > 4.6 4.5 4.5   CHLORIDE 101 102  --   --  110  --    CO2 21 21  --   --  21  --    GLUCOSE 111* 120*  --   --  112*  --    BUN 17 18  --   --  11  --    CREATININE 0.82 0.90  --   --  0.63  --    CALCIUM 9.5 9.2  --   --  8.1*  --     < > = values in this interval not displayed.     Recent Labs     10/11/22  2032 10/12/22  1241   APTT 53.2*  32.6   INR 1.07 1.28*         Medications:  Scheduled Medications   Medication Dose Frequency    magnesium sulfate  1 g DAILY    K+ Scale: Goal of 4.5  1 Each Q6HRS    Pharmacy Consult Request  1 Each PHARMACY TO DOSE    acetaminophen  1,000 mg Q6HRS    senna-docusate  2 Tablet BID    And    polyethylene glycol/lytes  1 Packet DAILY    And    [START ON 10/14/2022] magnesium hydroxide  30 mL DAILY    omeprazole  20 mg DAILY    clopidogrel  75 mg DAILY    rosuvastatin  20 mg QHS    [START ON 10/14/2022] carvedilol  3.125 mg BID WITH MEALS    [START ON 10/14/2022] losartan  100 mg Q DAY    aspirin EC  81 mg DAILY    levothyroxine  100 mcg AM ES        Exam:   Physical Exam  Constitutional:       General: She is not in acute distress.  HENT:      Head: Normocephalic and atraumatic.      Mouth/Throat:      Mouth: Mucous membranes are moist.      Pharynx: Oropharynx is clear.   Eyes:      Pupils: Pupils are equal, round, and reactive to light.   Cardiovascular:      Rate and Rhythm: Normal rate and regular rhythm.      Pulses: Normal pulses.      Heart sounds:     Friction rub present.   Pulmonary:      Effort: Pulmonary effort is normal.      Breath sounds: Decreased breath sounds present.   Abdominal:      General: Bowel sounds are decreased.      Palpations: Abdomen is soft.      Tenderness: There is no abdominal tenderness.   Musculoskeletal:      Cervical back: Neck supple. No tenderness.      Right lower le+ Edema present.      Left lower le+ Edema present.   Skin:     General: Skin is warm and dry.      Comments: Sternal incision, prevena in place     Neurological:      General: No focal deficit present.      Mental Status: She is oriented to person, place, and time. Mental status is at baseline.   Psychiatric:         Mood and Affect: Mood normal.         Behavior: Behavior normal.       Cardiac Medications:    ASA - Yes    Plavix - Yes    Post-operative Beta Blockers - Yes    Ace/ARB- Yes    Statin -  Yes    Aldactone- No; contraindicated because of Normal EF    Ejection Fraction:  40%    Telemetry:   10/13 SR    Assessment/Plan:  POD 1  HDS, SR, neuro intact, wounds CDI, abdomen soft, fluid balance up, wt up,  3L NC, 290 mL out of chest tubes overnight Plan:  Keep chest tubes and pacing wires, diurese, IS/ambulate.    Disposition:  TBD

## 2022-10-13 NOTE — CARE PLAN
Problem: Day of surgery post CABG/Heart valve replacement  Goal: Stabilization in immediate post op period  Outcome: Progressing  Intervention: If radial artery used, elevate arm, no BP checks or needle sticks from affected arm, monitor ulnar pulse and capillary refill  Note: CMS intact  Intervention: Initiate post cardiac insulin infusion protocol orders for FSBS greater than 140 and check frequency per protocol  Note: Not required  Intervention: FSBS frequency as per Cardiac Surgery Insulin Drip Protocol  Note: Not required  Intervention: Chest tube to 20 cm suction, record CT drainage with VS, and check for air leak  Note: Negative for air leak  Intervention: For CT drainage >300 mL in first hour post op and/or 150 mL in subsequent hours: Stat platelets, PT, INR, TEG, iSTAT, and H&H per order  Note: N/A  Intervention: Titrate and wean off vasoactive drips per patient's condition and per MD order while maintaining SBP  mmHg per MD order  Note: Patient needing levo gtt  Intervention: IS q 1 hour while awake post extubation  Note: To be performed  Intervention: Dangle within 4 hours post extubation  Note: Patient extubated at 1710  Intervention: Up in chair 4 hours, day of extubation  Note: Extubated at 1710  Intervention: Discontinue Cortez rhoda and arterial line 12-18 hours post op if hemodynamically stable and off vasoactive drips  Note: Requiring art line  Intervention: A-Fib and DVT prophylaxis per MD order or contraindications documented (refer to DVT/VTE problem on Care Plan)  Note: N/A  Intervention: Amiodarone protocol per MD order  Note: N/A   The patient is Watcher - Medium risk of patient condition declining or worsening    Shift Goals  Clinical Goals: Pre op checklist  Patient Goals: Prepare for surgery  Family Goals: KAL    Progress made toward(s) clinical / shift goals:  extubated    Patient is not progressing towards the following goals:      Problem: Pain - Standard  Goal: Alleviation of pain  or a reduction in pain to the patient’s comfort goal  Outcome: Not Progressing  Flowsheets (Taken 10/12/2022 8826)  Pain Rating Scale (NPRS): 9

## 2022-10-13 NOTE — PROGRESS NOTES
Patient meeting all extubation parameters, follows commands, MONTIEL, NIF -46, VC 1L. ABG WNL.      Patient extubated at 1710 by RT, placed on 6L NC oxygen saturation 95%. No stridor.

## 2022-10-13 NOTE — PROGRESS NOTES
-160 after walking 100 feet, even on repeat after 15 and 30 minutes at rest. Urine output decreased in past four hours after AM lasix worn off. Rakesh OCAMPO updated on both findings, new order for 40 mg IV lasix now and 20 mg PO potassium chloride now.

## 2022-10-13 NOTE — PROGRESS NOTES
Pericardial friction rub, echocardiogram results, and BP all discussed with Ana OCAMPO and Narda OCAMPO. Per APRNs, to continue with current plan of care and 20 mg IV lasix daily ordered.

## 2022-10-13 NOTE — DIETARY
Nutrition Services: Day 7 of admit.  Clary Bah is a 56 y.o. female with admitting DX of  Hypertensive urgency.    Consult received for Cardiac Rehab Diet Education. S/p CABG x2 10/12.    BMI 34.76     Latest Reference Range & Units 10/6/22 21:27   Cholesterol,Tot 100 - 199 mg/dL 212 (H)   Triglycerides 0 - 149 mg/dL 198 (H)   HDL >=40 mg/dL 38 !   LDL <100 mg/dL 134 (H)     Attempted diet education this afternoon, but pt was asleep. Left packet of pertinent handouts on bedside table (General, healthful nutrition; Cholesterol-lowering nutrition therapy; 1500-calorie 5-day sample menus.    RD available as further indicated.

## 2022-10-13 NOTE — THERAPY
Physical Therapy Contact Note    Patient Name: Clary Bah  Age:  56 y.o., Sex:  female  Medical Record #: [id number]  Today's Date: 10/13/2022    PT orders received and acknowledged. Pt is POD#1 [facility]. Will hold PT cardiac rehab eval and re-attempt POD#2 as medically appropriate for accurate assessment and education.    Joe Jones PT, DPT

## 2022-10-13 NOTE — THERAPY
Missed Therapy     Patient Name: Clary Bah  Age:  56 y.o., Sex:  female  Medical Record #: [id number]  Today's Date: 10/13/2022    OT consult rec'd. POD #1. Will re-attempt as appropriate/able once pt reaches POD#2 per order.

## 2022-10-13 NOTE — PROGRESS NOTES
"Post extubation patient c/o \"50/10\" chest pain, states \" feels like something sitting on my chest\". Patient medicated with fentanyl. Post fentanyl patient sleeping, bp soft 85-95 systolic. APN updated. CVP 10-12. Per APN use levo for bp maintenance. ECHO ordered.   "

## 2022-10-14 LAB
ANION GAP SERPL CALC-SCNC: 8 MMOL/L (ref 7–16)
BUN SERPL-MCNC: 11 MG/DL (ref 8–22)
CALCIUM SERPL-MCNC: 8.9 MG/DL (ref 8.5–10.5)
CHLORIDE SERPL-SCNC: 103 MMOL/L (ref 96–112)
CO2 SERPL-SCNC: 24 MMOL/L (ref 20–33)
CREAT SERPL-MCNC: 0.51 MG/DL (ref 0.5–1.4)
EKG IMPRESSION: NORMAL
EKG IMPRESSION: NORMAL
ERYTHROCYTE [DISTWIDTH] IN BLOOD BY AUTOMATED COUNT: 43.5 FL (ref 35.9–50)
GFR SERPLBLD CREATININE-BSD FMLA CKD-EPI: 109 ML/MIN/1.73 M 2
GLUCOSE SERPL-MCNC: 117 MG/DL (ref 65–99)
HCT VFR BLD AUTO: 30.2 % (ref 37–47)
HGB BLD-MCNC: 10.3 G/DL (ref 12–16)
LV EJECT FRACT  99904: 65
MCH RBC QN AUTO: 30.8 PG (ref 27–33)
MCHC RBC AUTO-ENTMCNC: 34.1 G/DL (ref 33.6–35)
MCV RBC AUTO: 90.4 FL (ref 81.4–97.8)
PLATELET # BLD AUTO: 209 K/UL (ref 164–446)
PMV BLD AUTO: 10.3 FL (ref 9–12.9)
POTASSIUM SERPL-SCNC: 4 MMOL/L (ref 3.6–5.5)
RBC # BLD AUTO: 3.34 M/UL (ref 4.2–5.4)
SODIUM SERPL-SCNC: 135 MMOL/L (ref 135–145)
WBC # BLD AUTO: 9.9 K/UL (ref 4.8–10.8)

## 2022-10-14 PROCEDURE — 700111 HCHG RX REV CODE 636 W/ 250 OVERRIDE (IP): Performed by: NURSE PRACTITIONER

## 2022-10-14 PROCEDURE — 97166 OT EVAL MOD COMPLEX 45 MIN: CPT

## 2022-10-14 PROCEDURE — 93005 ELECTROCARDIOGRAM TRACING: CPT | Performed by: THORACIC SURGERY (CARDIOTHORACIC VASCULAR SURGERY)

## 2022-10-14 PROCEDURE — 700102 HCHG RX REV CODE 250 W/ 637 OVERRIDE(OP): Performed by: STUDENT IN AN ORGANIZED HEALTH CARE EDUCATION/TRAINING PROGRAM

## 2022-10-14 PROCEDURE — 97163 PT EVAL HIGH COMPLEX 45 MIN: CPT

## 2022-10-14 PROCEDURE — 85027 COMPLETE CBC AUTOMATED: CPT

## 2022-10-14 PROCEDURE — 700102 HCHG RX REV CODE 250 W/ 637 OVERRIDE(OP): Performed by: CLINICAL NURSE SPECIALIST

## 2022-10-14 PROCEDURE — A9270 NON-COVERED ITEM OR SERVICE: HCPCS | Performed by: CLINICAL NURSE SPECIALIST

## 2022-10-14 PROCEDURE — 770020 HCHG ROOM/CARE - TELE (206)

## 2022-10-14 PROCEDURE — 97535 SELF CARE MNGMENT TRAINING: CPT

## 2022-10-14 PROCEDURE — 700105 HCHG RX REV CODE 258: Performed by: NURSE PRACTITIONER

## 2022-10-14 PROCEDURE — 93010 ELECTROCARDIOGRAM REPORT: CPT | Performed by: INTERNAL MEDICINE

## 2022-10-14 PROCEDURE — P9045 ALBUMIN (HUMAN), 5%, 250 ML: HCPCS | Performed by: NURSE PRACTITIONER

## 2022-10-14 PROCEDURE — 700102 HCHG RX REV CODE 250 W/ 637 OVERRIDE(OP): Performed by: NURSE PRACTITIONER

## 2022-10-14 PROCEDURE — A9270 NON-COVERED ITEM OR SERVICE: HCPCS | Performed by: STUDENT IN AN ORGANIZED HEALTH CARE EDUCATION/TRAINING PROGRAM

## 2022-10-14 PROCEDURE — A9270 NON-COVERED ITEM OR SERVICE: HCPCS | Performed by: NURSE PRACTITIONER

## 2022-10-14 PROCEDURE — 80048 BASIC METABOLIC PNL TOTAL CA: CPT

## 2022-10-14 RX ORDER — ALBUMIN, HUMAN INJ 5% 5 %
25 SOLUTION INTRAVENOUS ONCE
Status: COMPLETED | OUTPATIENT
Start: 2022-10-14 | End: 2022-10-14

## 2022-10-14 RX ORDER — SODIUM CHLORIDE, SODIUM LACTATE, POTASSIUM CHLORIDE, AND CALCIUM CHLORIDE .6; .31; .03; .02 G/100ML; G/100ML; G/100ML; G/100ML
500 INJECTION, SOLUTION INTRAVENOUS ONCE
Status: COMPLETED | OUTPATIENT
Start: 2022-10-14 | End: 2022-10-14

## 2022-10-14 RX ADMIN — ALBUMIN (HUMAN) 25 G: 2.5 SOLUTION INTRAVENOUS at 13:01

## 2022-10-14 RX ADMIN — CARVEDILOL 3.12 MG: 3.12 TABLET, FILM COATED ORAL at 07:55

## 2022-10-14 RX ADMIN — ROSUVASTATIN CALCIUM 20 MG: 20 TABLET, FILM COATED ORAL at 19:43

## 2022-10-14 RX ADMIN — SODIUM CHLORIDE, POTASSIUM CHLORIDE, SODIUM LACTATE AND CALCIUM CHLORIDE 500 ML: 600; 310; 30; 20 INJECTION, SOLUTION INTRAVENOUS at 19:36

## 2022-10-14 RX ADMIN — ACETAMINOPHEN 1000 MG: 500 TABLET ORAL at 11:34

## 2022-10-14 RX ADMIN — SENNOSIDES AND DOCUSATE SODIUM 2 TABLET: 50; 8.6 TABLET ORAL at 04:21

## 2022-10-14 RX ADMIN — POLYETHYLENE GLYCOL 3350 1 PACKET: 17 POWDER, FOR SOLUTION ORAL at 04:21

## 2022-10-14 RX ADMIN — LEVOTHYROXINE SODIUM 100 MCG: 0.1 TABLET ORAL at 04:21

## 2022-10-14 RX ADMIN — ASPIRIN 81 MG: 81 TABLET, COATED ORAL at 04:21

## 2022-10-14 RX ADMIN — OMEPRAZOLE 20 MG: 20 CAPSULE, DELAYED RELEASE ORAL at 04:21

## 2022-10-14 RX ADMIN — AMIODARONE HYDROCHLORIDE 1 MG/MIN: 1.8 INJECTION, SOLUTION INTRAVENOUS at 18:19

## 2022-10-14 RX ADMIN — AMIODARONE HYDROCHLORIDE 150 MG: 1.5 INJECTION, SOLUTION INTRAVENOUS at 18:10

## 2022-10-14 RX ADMIN — CARVEDILOL 3.12 MG: 3.12 TABLET, FILM COATED ORAL at 17:41

## 2022-10-14 RX ADMIN — SENNOSIDES AND DOCUSATE SODIUM 2 TABLET: 50; 8.6 TABLET ORAL at 17:41

## 2022-10-14 RX ADMIN — FUROSEMIDE 20 MG: 10 INJECTION, SOLUTION INTRAMUSCULAR; INTRAVENOUS at 04:22

## 2022-10-14 RX ADMIN — MAGNESIUM SULFATE HEPTAHYDRATE 1 G: 1 INJECTION, SOLUTION INTRAVENOUS at 04:21

## 2022-10-14 RX ADMIN — LOSARTAN POTASSIUM 100 MG: 50 TABLET, FILM COATED ORAL at 05:11

## 2022-10-14 RX ADMIN — ACETAMINOPHEN 1000 MG: 500 TABLET ORAL at 04:21

## 2022-10-14 RX ADMIN — MAGNESIUM HYDROXIDE 30 ML: 400 SUSPENSION ORAL at 11:34

## 2022-10-14 RX ADMIN — ACETAMINOPHEN 1000 MG: 500 TABLET ORAL at 17:40

## 2022-10-14 RX ADMIN — CLOPIDOGREL BISULFATE 75 MG: 75 TABLET ORAL at 04:21

## 2022-10-14 ASSESSMENT — COGNITIVE AND FUNCTIONAL STATUS - GENERAL
DRESSING REGULAR UPPER BODY CLOTHING: A LITTLE
DRESSING REGULAR LOWER BODY CLOTHING: A LOT
SUGGESTED CMS G CODE MODIFIER DAILY ACTIVITY: CK
CLIMB 3 TO 5 STEPS WITH RAILING: A LITTLE
STANDING UP FROM CHAIR USING ARMS: A LITTLE
DAILY ACTIVITIY SCORE: 17
MOBILITY SCORE: 18
HELP NEEDED FOR BATHING: A LOT
PERSONAL GROOMING: A LITTLE
MOVING TO AND FROM BED TO CHAIR: A LITTLE
WALKING IN HOSPITAL ROOM: A LITTLE
SUGGESTED CMS G CODE MODIFIER MOBILITY: CK
MOVING FROM LYING ON BACK TO SITTING ON SIDE OF FLAT BED: A LITTLE
TOILETING: A LITTLE
TURNING FROM BACK TO SIDE WHILE IN FLAT BAD: A LITTLE

## 2022-10-14 ASSESSMENT — PAIN DESCRIPTION - PAIN TYPE
TYPE: SURGICAL PAIN
TYPE: ACUTE PAIN;SURGICAL PAIN
TYPE: SURGICAL PAIN

## 2022-10-14 ASSESSMENT — GAIT ASSESSMENTS
DEVIATION: BRADYKINETIC;SHUFFLED GAIT
DISTANCE (FEET): 100
GAIT LEVEL OF ASSIST: STANDBY ASSIST
ASSISTIVE DEVICE: FRONT WHEEL WALKER

## 2022-10-14 ASSESSMENT — FIBROSIS 4 INDEX: FIB4 SCORE: 1.48

## 2022-10-14 ASSESSMENT — ACTIVITIES OF DAILY LIVING (ADL): TOILETING: INDEPENDENT

## 2022-10-14 NOTE — CARE PLAN
Problem: Hyperinflation  Goal: Prevent or improve atelectasis  Description: Target End Date:  3 to 4 days    1. Instruct incentive spirometry usage  2.  Perform hyperinflation therapy as indicated  Outcome: Progressing  Flowsheets (Taken 10/13/2022 1400)  Hyperinflation Protocol Goals/Outcome:   Improvement in Repeat CXR   Increased Vital Capacity or Return to Pre-operative Values  Hyperinflation Protocol Indications: Chest Trauma (Blunt, Penetrative, Crushing, or Surgical)   PEP QID, IS 1100

## 2022-10-14 NOTE — PROGRESS NOTES
Mediastinal chest tubes removed by [facility] RN. Appropriate LDAs Opened. Site clean, all sutures removed from site, no oozing at this time

## 2022-10-14 NOTE — PROGRESS NOTES
Cardiovascular Surgery Progress Note    Name: Clary Bah  MRN: [id number]  : 1965  Admit Date: 10/6/2022  9:17 PM  2 Days Post-Op     Procedure:  Procedure(s) and Anesthesia Type:     * CORONARY ARTERY BYPASS GRAFTING X2, ENDOSCOPIC VEIN HARVEST - General     * ECHOCARDIOGRAM, TRANSESOPHAGEAL - General    Vitals:  Vitals:    10/14/22 0656 10/14/22 0700 10/14/22 0755 10/14/22 0800   BP:  116/52 125/61 125/61   Pulse: (!) 101 100 (!) 101 98   Resp: 18      Temp:       TempSrc:    Bladder   SpO2: 93% 93%  90%   Weight:       Height:          Temp (24hrs), Av.9 °C (100.3 °F), Min:37.7 °C (99.9 °F), Max:38.1 °C (100.6 °F)      Respiratory:    Respiration: 18, Pulse Oximetry: 90 %       Fluids:    Intake/Output Summary (Last 24 hours) at 10/14/2022 0929  Last data filed at 10/14/2022 0800  Gross per 24 hour   Intake 1310 ml   Output 2680 ml   Net -1370 ml       Admit weight: Weight: 113 kg (249 lb)  Current weight: Weight: 95.8 kg (211 lb 3.2 oz) (10/14/22 0532)    Labs:  Recent Labs     10/11/22  2032 10/12/22  1241 10/13/22  0024 10/14/22  0324   WBC 9.6  --  6.6 9.9   RBC 4.09*  --  3.16* 3.34*   HEMOGLOBIN 12.7  --  9.8* 10.3*   HEMATOCRIT 37.2  --  28.5* 30.2*   MCV 91.0  --  90.2 90.4   MCH 31.1  --  31.0 30.8   MCHC 34.1  --  34.4 34.1   RDW 44.3  --  43.9 43.5   PLATELETCT 257 165 177 209   MPV 10.5  --  10.8 10.3       Recent Labs     10/11/22  2032 10/12/22  1241 10/13/22  0024 10/13/22  0615 10/14/22  0324   SODIUM 135  --  139  --  135   POTASSIUM 3.9   < > 4.5 4.5 4.0   CHLORIDE 102  --  110  --  103   CO2 21  --  21  --  24   GLUCOSE 120*  --  112*  --  117*   BUN 18  --  11  --  11   CREATININE 0.90  --  0.63  --  0.51   CALCIUM 9.2  --  8.1*  --  8.9    < > = values in this interval not displayed.       Recent Labs     10/11/22  2032 10/12/22  1241   APTT 53.2* 32.6   INR 1.07 1.28*           Medications:  Scheduled Medications   Medication Dose Frequency    furosemide  20 mg Q DAY     Pharmacy Consult Request  1 Each PHARMACY TO DOSE    acetaminophen  1,000 mg Q6HRS    senna-docusate  2 Tablet BID    And    polyethylene glycol/lytes  1 Packet DAILY    And    magnesium hydroxide  30 mL DAILY    omeprazole  20 mg DAILY    clopidogrel  75 mg DAILY    rosuvastatin  20 mg QHS    carvedilol  3.125 mg BID WITH MEALS    losartan  100 mg Q DAY    aspirin EC  81 mg DAILY    levothyroxine  100 mcg AM ES          Exam:   Physical Exam  Constitutional:       General: She is not in acute distress.  HENT:      Head: Normocephalic and atraumatic.      Mouth/Throat:      Mouth: Mucous membranes are moist.      Pharynx: Oropharynx is clear.   Eyes:      Pupils: Pupils are equal, round, and reactive to light.   Cardiovascular:      Rate and Rhythm: Normal rate and regular rhythm.      Pulses: Normal pulses.      Heart sounds:     Friction rub present.   Pulmonary:      Effort: Pulmonary effort is normal.      Breath sounds: Decreased breath sounds present.   Abdominal:      General: Bowel sounds are decreased.      Palpations: Abdomen is soft.      Tenderness: There is no abdominal tenderness.   Musculoskeletal:      Cervical back: Neck supple. No tenderness.      Right lower le+ Edema present.      Left lower le+ Edema present.   Skin:     General: Skin is warm and dry.      Comments: Sternal incision, prevena in place     Neurological:      General: No focal deficit present.      Mental Status: She is oriented to person, place, and time. Mental status is at baseline.   Psychiatric:         Mood and Affect: Mood normal.         Behavior: Behavior normal.       Cardiac Medications:    ASA - Yes    Plavix - Yes    Post-operative Beta Blockers - Yes    Ace/ARB- Yes    Statin - Yes    Aldactone- No; contraindicated because of Normal EF    Ejection Fraction:  40%    Telemetry:   10/13 SR  10/14 SR    Assessment/Plan:  POD 1  HDS, SR, neuro intact, wounds CDI, abdomen soft, fluid balance up, wt up,  3L NC, 290  mL out of chest tubes overnight Plan:  Keep chest tubes and pacing wires, diurese, IS/ambulate.    POD 2  HDS, SR, neuro intact, wounds CDI, abdomen soft, fluid balance negative, wt down, diuresing well, 100 out of chest tubes overnight.  Plan:  Remove chest tubes, keep pacing wires, IS/ambulate.     Disposition:  TBD

## 2022-10-14 NOTE — PROGRESS NOTES
Received pt as transfer from ICU.  Patient A&O x 4. VS'S. Currently on 1 L via NC. Sinus rhythm with ST elevation on tele monitor. Pt has known pericarditis. Denies any pain at this time. POC discussed with patient. Pt verbalizes understanding. Call light and belongings with in reach. Bed locked and in lowest position, alarm and fall precautions in place.

## 2022-10-14 NOTE — CARE PLAN
The patient is Stable - Low risk of patient condition declining or worsening    Shift Goals  Clinical Goals: Pain control, mobilize  Patient Goals: rest  Family Goals: KAL    Progress made toward(s) clinical / shift goals:    Problem: Pain - Standard  Goal: Alleviation of pain or a reduction in pain to the patient’s comfort goal  Outcome: Progressing  Note: Pt's pain is being controlled with analgesics     Problem: Post Op Day 1 CABG/Heart Valve Replacement  Goal: Optimal care of the post op CABG/heart valve replacement Post Op Day 1  Outcome: Progressing  Intervention: Discontinue bill catheter unless documented reason for continuation  Note: Bill remains in place per Ana OCAMPO  Intervention: Assess surgical dressing and check provider orders for potential removal  Note: Prevena wound vac in place over mediastinal site. EVH site open to air.   Intervention: Ensure referal to intensive cardiac rehab is ordered, and smoking cessation education if appropriate  Note: Completed by APRN  Intervention: [facility] trained RN to remove chest tubes if ordered by provider  Note: CT still in place per Ana Figueroa APRN  Intervention: Transfer to [facility] status, begin VS q 4 hours  Note: Completed on post op day 2  Intervention: After 24th hour post-anesthesia end time, transition patient to Cardiac Surgery SQ Insulin Protocol  Note: Pt is no longer needs insulin  Intervention: If patient is CABG or on home beta-blocker, start/resume beta-blocker on POD 1 or POD 2 or document contraindication  Note: No beta-blockers currently on MAR       Patient is not progressing towards the following goals:

## 2022-10-14 NOTE — PROGRESS NOTES
Ana APRN updated on SBP 80-90 x3 in one hour. Per Ana, new order for albumin 5% 25 grams now, delay transfer until administration complete and BP stable.

## 2022-10-14 NOTE — THERAPY
Occupational Therapy   Initial Evaluation     Patient Name: Clary Bah  Age:  56 y.o., Sex:  female  Medical Record #: [id number]  Today's Date: 10/14/2022     Precautions  Precautions: Fall Risk, Sternal Precautions (See Comments), Cardiac Precautions (See Comments)  Comments: s/p CABG; prevena and x2 CTs    Assessment  Patient is 56 y.o. female admitted for hypertensive emergency, hypokalemia, and NSTEMI now s/p CABG x2. PMHx of HTN, SVT, and hypothyroidism. Pt receptive to education, but questionable retention; difficulty keeping eyes open after short ambulation, recommend reinforcement. Pt anxious/guarded with functional mobility throughout session. Pt reports will be staying at her sister's home with below setup. Reports sister has polio and isn't able to provide physical assist, but niece will be stopping by to assist them both when not working. Pt's son present and supportive; unclear if he can assist. Currently limited by decreased functional mobility, activity tolerance, cognition, balance, adherence to precautions, and pain which are currently affecting pt's ability to complete ADLs/IADLs at baseline. Will continue to follow.     Plan    Recommend Occupational Therapy 4 times per week until therapy goals are met for the following treatments:  Adaptive Equipment, Neuro Re-Education / Balance, Self Care/Activities of Daily Living, Therapeutic Activities, and Therapeutic Exercises.    DC Equipment Recommendations: Unable to determine at this time  Discharge Recommendations: Recommend home health for continued occupational therapy services (as long as family is able to assist PRN 24/7. Likely will improve while in house)      Objective     10/14/22 1014   Prior Living Situation   Prior Services Home-Independent   Housing / Facility 2 Story House   Steps Into Home   (ramp)   Steps In Home   (chair lift to 2nd floor)   Comments Pt reports will be staying at her sister's home with above setup. Reports sister has  polio and isn't able to provide physical assist, but niece will be stopping by to assist them both when not working. Pt's son present and supportive; unclear if he can assist.   Prior Level of ADL Function   Self Feeding Independent   Grooming / Hygiene Independent   Bathing Independent   Dressing Independent   Toileting Independent   Prior Level of IADL Function   Medication Management Independent   Laundry Independent   Kitchen Mobility Independent   Finances Independent   Home Management Independent   Shopping Independent   Prior Level Of Mobility Independent Without Device in Community;Independent Without Device in Home   Driving / Transportation Driving Independent   Occupation (Pre-Hospital Vocational) Employed Full Time;Requires Sitting, Desk, Computer Tasks   Precautions   Precautions Fall Risk;Sternal Precautions (See Comments);Cardiac Precautions (See Comments);Chest Tube   Comments s/p CABG x2 chest tubes   Vitals   O2 (LPM) 2   O2 Delivery Device Silicone Nasal Cannula   Pain 0 - 10 Group   Location Rib Cage   Location Orientation Right   Therapist Pain Assessment Post Activity;During Activity;Nurse Notified  (reported shooting pain with txf to R side near breast)   Cognition    Cognition / Consciousness X   Level of Consciousness Alert   New Learning Impaired   Attention Impaired   Comments pleasant and cooperative; receptive to education, but questionable retention, difficulty keeping eyes open after short ambulation. Pt anxious/guarded with mobility thoughout.   Passive ROM Upper Body   Passive ROM Upper Body WDL   Active ROM Upper Body   Active ROM Upper Body  WDL   Strength Upper Body   Comments limited by precautions   Balance Assessment   Sitting Balance (Static) Fair +   Sitting Balance (Dynamic) Fair   Standing Balance (Static) Fair -   Standing Balance (Dynamic) Fair -   Weight Shift Sitting Fair   Weight Shift Standing Fair   Comments w/FWW   Bed Mobility    Scooting Minimal Assist   Comments  found and left in chair   ADL Assessment   Eating Supervision  (eating pudding)   Grooming Supervision;Seated  (able to wipe face)   Lower Body Dressing Moderate Assist  (socks)   Toileting Moderate Assist  (bill, but able to make it to toilet)   Functional Mobility   Sit to Stand Contact Guard Assist   Bed, Chair, Wheelchair Transfer Contact Guard Assist   Toilet Transfers Minimal Assist   Transfer Method Stand Step   Mobility w/FWW; in room, fatigued quickly in standing. Small guarded steps   ICU Target Mobility Level   ICU Mobility - Targeted Level Level 4   Edema / Skin Assessment   Edema / Skin  Not Assessed   Comments prevena and CTx2 intact pre/post session   Activity Tolerance   Sitting in Chair found and left in chair   Standing fatigued quickly in standing   Patient / Family Goals   Patient / Family Goal #1 to go home   Short Term Goals   Short Term Goal # 1 standing g/h with SPV >1 min   Short Term Goal # 2 toilet txf with SPV   Short Term Goal # 3 verbalize and maintain sternal precautions during ADLs w/o v/cs.   Short Term Goal # 4 LB dressing with SPV   Education Group   Education Provided Role of Occupational Therapist;Transfers;Cardiac Precautions;Sternal Precautions;Activities of Daily Living;Pathology of bedrest   Role of Occupational Therapist Patient Response Patient;Acceptance;Family;Explanation;Verbal Demonstration;Reinforcement Needed   Cardiac Precautions Patient Response Patient;Family;Acceptance;Explanation;Handout;Verbal Demonstration;Reinforcement Needed   Sternal Precautions Patient Response Patient;Family;Acceptance;Explanation;Demonstration;Handout;Action Demonstration;Reinforcement Needed   Transfers Patient Response Patient;Family;Acceptance;Explanation;Handout;Action Demonstration;Reinforcement Needed   ADL Patient Response Patient;Family;Acceptance;Explanation;Verbal Demonstration;Reinforcement Needed;Handout   Pathology of Bedrest Patient Response  Patient;Acceptance;Explanation;Verbal Demonstration;Reinforcement Needed   Problem List   Problem List Decreased Active Daily Living Skills;Decreased Homemaking Skills;Decreased Functional Mobility;Decreased Activity Tolerance;Safety Awareness Deficits / Cognition;Impaired Postural Control / Balance;Limited Knowledge of Post Op Precautions

## 2022-10-14 NOTE — CARE PLAN
The patient is Stable - Low risk of patient condition declining or worsening    Shift Goals  Clinical Goals: maintain BP at target, titrate oxygen down, improve IS, tolerate progressive mobility  Patient Goals: rest  Family Goals: KAL    Progress made toward(s) clinical / shift goals:  BP elevated after mobilization, lasix ordered by CT surgery, BP back down. Oxygen down to 1L NC, IS best 1500, mobilized x3 with increased distance each time    Patient is not progressing towards the following goals:      Problem: Post Op Day 1 CABG/Heart Valve Replacement  Goal: Optimal care of the post op CABG/heart valve replacement Post Op Day 1  Outcome: Progressing  Intervention: EKG and CXR completed  Note: Completed on NOC shift  Intervention: All valve patients: PT/INR daily  Note: Completed on NOC shift  Intervention: Daily weights in the morning  Note: Completed on noc shift  Intervention: Up in chair for all meals  Note: Patient up in chair all day except when ambulating  Intervention: Ambulate in am if stable. First ambulation 25 feet. Repeat x 3 as tolerated  Note: Stood and took 10 steps in place, walked 25 feet, walked 100 feet  Intervention: Discontinue bill catheter unless documented reason for continuation  Note: Bill remains in place per CT surgery  Intervention: Assess surgical dressing and check provider orders for potential removal  Note: Prevena remains in place  Intervention: [facility] trained RN to remove chest tubes if ordered by provider  Note: Remain in place today per CT surgery team  Intervention: IS q 1 hour while awake and record best IS volume  Note: Best effort 1500, 1250 most of day  Intervention: Knee high CHRISTIANNE hose, on during the day, off at night  Note: CHRISTIANNE hose on all of shift  Intervention: Saline lock IV  Note: Completed by [facility] RN

## 2022-10-14 NOTE — THERAPY
"Physical Therapy   Cardiac Rehab Initial Evaluation     Patient Name: Clary Bah  Age:  56 y.o., Sex:  female  Medical Record #: [id number]  Today's Date: 10/14/2022     Precautions  Precautions: Fall Risk;Sternal Precautions (See Comments);Cardiac Precautions (See Comments)  Comments: s/p CABG    Assessment  Patient is 56 y.o. female s/p CABG on 10/12. Pmhx of heart palpitations on metoprolol, HTN, and hypothyroidism. Pt with new O2 needs at 2L NC, normally on RA at baseline. Pt required SBA for STS, transfers, and household distance ambulation w/ FWW. Pt with VSS, negative talk test, and RPE of 11 with ambulation. Pt plans to d/c to sisters 2SH with a ramp to enter and chair lift to 2nd floor, also has a recliner. Recommend home with HH to improve confidence and safety with mobility as pt guarded throughout session. Patient will not be actively followed for physical therapy services at this time, however may be seen if requested by physician for 1 more visit within 30 days to address any discharge or equipment needs.     Pt receptive to [facility] with handouts provided regarding cardiac and sternal precautions, walking program, talk test, RPE scale, BP/HR monitoring, s/s heart failure response, Cardiac rehab phase I and II, activity pacing and progression, following up with cardiologist, and modifiable risk factors.     Plan    Recommend Physical Therapy for Evaluation only     DC Equipment Recommendations: None (reports has FWW)  Discharge Recommendations: Recommend home health for continued physical therapy services       Subjective    \"Are you here to torture me?\"      Objective     10/14/22 1040   Vitals   O2 (LPM) 2  (Pt with new O2 needs, reports on RA at baseline)   O2 Delivery Device Silicone Nasal Cannula   Vitals Comments BP & HR: seated 134/58, 106; seated post ambulating 118/59, 106. Denies ligthheadedness,dizziness. RPE 11, negative talk test   Pain 0 - 10 Group   Therapist Pain Assessment Nurse " Notified;During Activity  (c/o burning at R pec area during STS only, RN aware)   Prior Living Situation   Prior Services None   Housing / Facility 1 Story House;2 Story House   Steps Into Home   (ramp)   Steps In Home   (chair lift to 2nd floor)   Equipment Owned Front-Wheel Walker;Ramp  (chair lift)   Lives with - Patient's Self Care Capacity   (will be staying at sister's house)   Comments pt plans to d/c to her sisters home with the above set up. Pt's sister with polio so home has adaptive equiptment. Unsure level of assist sister is able to provide, however, pt's son present and appears supportive   Prior Level of Functional Mobility   Bed Mobility Unable To Determine At This Time   Transfer Status Unable To Determine At This Time   Ambulation Unable To Determine At This Time   Distance Ambulation (Feet)   (community)   Assistive Devices Used None   Stairs Independent   Cognition    Cognition / Consciousness WDL   Level of Consciousness Alert   Comments Pleasant and cooperative. Appeared slightly anxious and guarded throughout session   Passive ROM Lower Body   Passive ROM Lower Body WDL   Active ROM Lower Body    Active ROM Lower Body  X   Comments R knee flexion limited 2/2 CABG vein graft site. otherwise WDL   Strength Lower Body   Lower Body Strength  WDL   Sensation Lower Body   Lower Extremity Sensation   WDL   Lower Body Muscle Tone   Lower Body Muscle Tone  WDL   Strength Upper Body   Comments limited by precautions   Upper Body Muscle Tone   Upper Body Muscle Tone  WDL   Coordination Upper Body   Coordination WDL   Coordination Lower Body    Coordination Lower Body  WDL   Balance Assessment   Sitting Balance (Static) Fair +   Sitting Balance (Dynamic) Fair +   Standing Balance (Static) Fair   Standing Balance (Dynamic) Fair -   Weight Shift Sitting Fair   Weight Shift Standing Fair   Comments w/ FWW   Gait Analysis   Gait Level Of Assist Standby Assist   Assistive Device Front Wheel Walker   Distance  (Feet) 100   # of Times Distance was Traveled 1   Deviation Bradykinetic;Shuffled Gait  (guarded posture, very slow kenna)   # of Stairs Climbed 0   Weight Bearing Status no restrictions   Bed Mobility    Comments NT, pt can sleep in recliner at sister's house   Functional Mobility   Sit to Stand Standby Assist   Bed, Chair, Wheelchair Transfer Standby Assist   Transfer Method Stand Step   Mobility w/ FWW household distance   ICU Target Mobility Level   ICU Mobility - Targeted Level Level 4   How much difficulty does the patient currently have...   Turning over in bed (including adjusting bedclothes, sheets and blankets)? 3   Sitting down on and standing up from a chair with arms (e.g., wheelchair, bedside commode, etc.) 3   Moving from lying on back to sitting on the side of the bed? 3   How much help from another person does the patient currently need...   Moving to and from a bed to a chair (including a wheelchair)? 3   Need to walk in a hospital room? 3   Climbing 3-5 steps with a railing? 3   6 clicks Mobility Score 18   Activity Tolerance   Comments no overt s/s of fatigue   Edema / Skin Assessment   Edema / Skin  WDL   Comments prevena, CTx2 appeared intact. CT's being pulled after ambulation   Education Group   Education Provided Role of Physical Therapist;Gait Training;Use of Assistive Device;Sternal Precautions;Cardiac Precautions   Cardiac Precautions Patient Response Patient;Acceptance;Explanation;Demonstration;Handout;Verbal Demonstration;Action Demonstration   Sternal Precautions Patient Response Patient;Acceptance;Explanation;Demonstration;Handout;Verbal Demonstration;Action Demonstration   Role of Physical Therapist Patient Response Patient;Acceptance;Family;Explanation;Verbal Demonstration   Gait Training Patient Response Patient;Acceptance;Explanation;Verbal Demonstration;Action Demonstration   Use of Assistive Device Patient Response Patient;Acceptance;Explanation;Action Demonstration    Additional Comments Pt educated on self management and compensatory strategies with mobility given s/p CABG   Problem List    Problems Pain;Impaired Ambulation;Impaired Balance;Decreased Activity Tolerance   Anticipated Discharge Equipment and Recommendations   DC Equipment Recommendations None  (reports has FWW)   Discharge Recommendations Recommend home health for continued physical therapy services   Interdisciplinary Plan of Care Collaboration   IDT Collaboration with  Nursing;Occupational Therapist;Family / Caregiver;Respiratory Therapist   Patient Position at End of Therapy Seated;Call Light within Reach;Tray Table within Reach;Phone within Reach;Family / Friend in Room   Collaboration Comments Rn updated   Session Information   Date / Session Number  10/14: Eval only, d/c needs

## 2022-10-15 ENCOUNTER — APPOINTMENT (OUTPATIENT)
Dept: RADIOLOGY | Facility: MEDICAL CENTER | Age: 57
DRG: 233 | End: 2022-10-15
Attending: THORACIC SURGERY (CARDIOTHORACIC VASCULAR SURGERY)
Payer: COMMERCIAL

## 2022-10-15 LAB
ANION GAP SERPL CALC-SCNC: 9 MMOL/L (ref 7–16)
BUN SERPL-MCNC: 12 MG/DL (ref 8–22)
CALCIUM SERPL-MCNC: 8.8 MG/DL (ref 8.5–10.5)
CHLORIDE SERPL-SCNC: 103 MMOL/L (ref 96–112)
CO2 SERPL-SCNC: 26 MMOL/L (ref 20–33)
CREAT SERPL-MCNC: 0.55 MG/DL (ref 0.5–1.4)
EKG IMPRESSION: NORMAL
ERYTHROCYTE [DISTWIDTH] IN BLOOD BY AUTOMATED COUNT: 43 FL (ref 35.9–50)
GFR SERPLBLD CREATININE-BSD FMLA CKD-EPI: 108 ML/MIN/1.73 M 2
GLUCOSE SERPL-MCNC: 108 MG/DL (ref 65–99)
HCT VFR BLD AUTO: 27.2 % (ref 37–47)
HGB BLD-MCNC: 9.4 G/DL (ref 12–16)
MAGNESIUM SERPL-MCNC: 2.1 MG/DL (ref 1.5–2.5)
MCH RBC QN AUTO: 30.8 PG (ref 27–33)
MCHC RBC AUTO-ENTMCNC: 34.6 G/DL (ref 33.6–35)
MCV RBC AUTO: 89.2 FL (ref 81.4–97.8)
PLATELET # BLD AUTO: 203 K/UL (ref 164–446)
PMV BLD AUTO: 10.3 FL (ref 9–12.9)
POTASSIUM SERPL-SCNC: 3.8 MMOL/L (ref 3.6–5.5)
RBC # BLD AUTO: 3.05 M/UL (ref 4.2–5.4)
SODIUM SERPL-SCNC: 138 MMOL/L (ref 135–145)
WBC # BLD AUTO: 8.6 K/UL (ref 4.8–10.8)

## 2022-10-15 PROCEDURE — A9270 NON-COVERED ITEM OR SERVICE: HCPCS | Performed by: NURSE PRACTITIONER

## 2022-10-15 PROCEDURE — 93010 ELECTROCARDIOGRAM REPORT: CPT | Performed by: INTERNAL MEDICINE

## 2022-10-15 PROCEDURE — 700111 HCHG RX REV CODE 636 W/ 250 OVERRIDE (IP): Performed by: NURSE PRACTITIONER

## 2022-10-15 PROCEDURE — 700111 HCHG RX REV CODE 636 W/ 250 OVERRIDE (IP): Performed by: THORACIC SURGERY (CARDIOTHORACIC VASCULAR SURGERY)

## 2022-10-15 PROCEDURE — A9270 NON-COVERED ITEM OR SERVICE: HCPCS | Performed by: CLINICAL NURSE SPECIALIST

## 2022-10-15 PROCEDURE — 700102 HCHG RX REV CODE 250 W/ 637 OVERRIDE(OP): Performed by: STUDENT IN AN ORGANIZED HEALTH CARE EDUCATION/TRAINING PROGRAM

## 2022-10-15 PROCEDURE — 71046 X-RAY EXAM CHEST 2 VIEWS: CPT

## 2022-10-15 PROCEDURE — 700102 HCHG RX REV CODE 250 W/ 637 OVERRIDE(OP): Performed by: NURSE PRACTITIONER

## 2022-10-15 PROCEDURE — A9270 NON-COVERED ITEM OR SERVICE: HCPCS | Performed by: STUDENT IN AN ORGANIZED HEALTH CARE EDUCATION/TRAINING PROGRAM

## 2022-10-15 PROCEDURE — 85027 COMPLETE CBC AUTOMATED: CPT

## 2022-10-15 PROCEDURE — 94669 MECHANICAL CHEST WALL OSCILL: CPT

## 2022-10-15 PROCEDURE — 83735 ASSAY OF MAGNESIUM: CPT

## 2022-10-15 PROCEDURE — 770020 HCHG ROOM/CARE - TELE (206)

## 2022-10-15 PROCEDURE — 80048 BASIC METABOLIC PNL TOTAL CA: CPT

## 2022-10-15 PROCEDURE — 94760 N-INVAS EAR/PLS OXIMETRY 1: CPT

## 2022-10-15 PROCEDURE — 700102 HCHG RX REV CODE 250 W/ 637 OVERRIDE(OP): Performed by: CLINICAL NURSE SPECIALIST

## 2022-10-15 RX ORDER — AMIODARONE HYDROCHLORIDE 200 MG/1
400 TABLET ORAL TWICE DAILY
Status: DISCONTINUED | OUTPATIENT
Start: 2022-10-15 | End: 2022-10-18 | Stop reason: HOSPADM

## 2022-10-15 RX ORDER — MAGNESIUM SULFATE HEPTAHYDRATE 40 MG/ML
2 INJECTION, SOLUTION INTRAVENOUS ONCE
Status: COMPLETED | OUTPATIENT
Start: 2022-10-15 | End: 2022-10-15

## 2022-10-15 RX ADMIN — CARVEDILOL 3.12 MG: 3.12 TABLET, FILM COATED ORAL at 17:30

## 2022-10-15 RX ADMIN — ROSUVASTATIN CALCIUM 20 MG: 20 TABLET, FILM COATED ORAL at 20:54

## 2022-10-15 RX ADMIN — LEVOTHYROXINE SODIUM 100 MCG: 0.1 TABLET ORAL at 04:56

## 2022-10-15 RX ADMIN — CLOPIDOGREL BISULFATE 75 MG: 75 TABLET ORAL at 04:56

## 2022-10-15 RX ADMIN — ACETAMINOPHEN 1000 MG: 500 TABLET ORAL at 17:30

## 2022-10-15 RX ADMIN — ACETAMINOPHEN 1000 MG: 500 TABLET ORAL at 04:55

## 2022-10-15 RX ADMIN — AMIODARONE HYDROCHLORIDE 400 MG: 200 TABLET ORAL at 17:30

## 2022-10-15 RX ADMIN — MAGNESIUM SULFATE HEPTAHYDRATE 2 G: 40 INJECTION, SOLUTION INTRAVENOUS at 12:19

## 2022-10-15 RX ADMIN — MAGNESIUM HYDROXIDE 30 ML: 400 SUSPENSION ORAL at 04:55

## 2022-10-15 RX ADMIN — ASPIRIN 81 MG: 81 TABLET, COATED ORAL at 04:56

## 2022-10-15 RX ADMIN — SENNOSIDES AND DOCUSATE SODIUM 2 TABLET: 50; 8.6 TABLET ORAL at 04:56

## 2022-10-15 RX ADMIN — AMIODARONE HYDROCHLORIDE 0.5 MG/MIN: 1.8 INJECTION, SOLUTION INTRAVENOUS at 11:30

## 2022-10-15 RX ADMIN — ACETAMINOPHEN 1000 MG: 500 TABLET ORAL at 00:23

## 2022-10-15 RX ADMIN — CARVEDILOL 3.12 MG: 3.12 TABLET, FILM COATED ORAL at 06:09

## 2022-10-15 RX ADMIN — AMIODARONE HYDROCHLORIDE 400 MG: 200 TABLET ORAL at 10:21

## 2022-10-15 RX ADMIN — LOSARTAN POTASSIUM 100 MG: 50 TABLET, FILM COATED ORAL at 04:56

## 2022-10-15 RX ADMIN — OMEPRAZOLE 20 MG: 20 CAPSULE, DELAYED RELEASE ORAL at 05:57

## 2022-10-15 RX ADMIN — POLYETHYLENE GLYCOL 3350 1 PACKET: 17 POWDER, FOR SOLUTION ORAL at 04:54

## 2022-10-15 RX ADMIN — FUROSEMIDE 20 MG: 10 INJECTION, SOLUTION INTRAMUSCULAR; INTRAVENOUS at 04:56

## 2022-10-15 RX ADMIN — ACETAMINOPHEN 1000 MG: 500 TABLET ORAL at 11:28

## 2022-10-15 ASSESSMENT — PAIN DESCRIPTION - PAIN TYPE: TYPE: SURGICAL PAIN

## 2022-10-15 ASSESSMENT — FIBROSIS 4 INDEX: FIB4 SCORE: 1.53

## 2022-10-15 NOTE — PROGRESS NOTES
4 Eyes Skin Assessment Completed by VIRGINIA Mccormick and BOSSMAN Contreras.    Head WDL  Ears Redness and Blanching  Nose WDL  Mouth WDL  Neck WDL  Breast/Chest Incision, Wound Vac in place, gauze in place over incision from chest tube  Shoulder Blades Redness and Blanching  Spine WDL  (R) Arm/Elbow/Hand Redness, Blanching, Bruising, and Edema  (L) Arm/Elbow/Hand Redness, Blanching, Bruising, and Edema  Abdomen Bruising  Groin WDL  Scrotum/Coccyx/Buttocks Redness and Blanching  (R) Leg [place] site Incisions on the medial aspect of leg, no redness drainage  (L) Leg WDL  (R) Heel/Foot/Toe Redness and Blanching  (L) Heel/Foot/Toe Redness and Blanching          Devices In Places Tele Box, Blood Pressure Cuff, Pulse Ox, Ta, Nasal Cannula, and CHRISTIANNE's      Interventions In Place Gray Ear Foams, Pillows, and Low Air Loss Mattress    Possible Skin Injury No    Pictures Uploaded Into Epic N/A  Wound Consult Placed N/A  RN Wound Prevention Protocol Ordered No

## 2022-10-15 NOTE — PROGRESS NOTES
Bedside report received. Patient A&O x 4. VS'S. Still requiring 1-2 L via NC will titrate off as tolerated. NSR on telemetry monitor. Amio gtt running. No complaints of pain at this time. POC discussed with patient. Pt verbalizes understanding. Call light and belongings with in reach. Bed locked and in lowest position, alarm and fall precautions in place.

## 2022-10-15 NOTE — CARE PLAN
The patient is Watcher - Medium risk of patient condition declining or worsening    Shift Goals  Clinical Goals: hemodynamic stability, IS, Incision site care  Patient Goals: Rest  Family Goals: KAL    Progress made toward(s) clinical / shift goals:    Problem: Mobility  Goal: Patient's capacity to carry out activities will improve  Outcome: Progressing  Note: Patient walking halls today.      Problem: Post op day 2 CABG/Heart Valve Replacement  Goal: Optimal care of the post op CABG/heart valve replacement post op day 2  Outcome: Progressing  Intervention: FSBS: when 2 consecutive BS < 130 after post op day 2, discontinue FSBS unless patient is insulin dependent diabetic  Note: Fingersticks discontinued prior to shift   Intervention: Up in chair for all meals  Note: Completed prior to shift   Intervention: Ambulate 4 times daily, increasing the distance each time  Note: 3 walks completed during day shift. Unable to complete 4th walk due to patient fatigue and A Fib RVR. Patient refused walk, RN educated pt on the importance of walks.   Intervention: Stand at sink and wash up with assistance.  Clean incisions twice daily with soap and water.  Note: Midline incision with prevena covering patient. [place] sites cleaned with soap and water   Intervention: IS q 1 hour while awake and record best IS volume  Note: RN encouraged IS through out shift. Best of 1500ml.  Intervention: Consider pacer wire removal by MD  Note: Pacer wires still in place and capped.  Intervention: Consider removal of ibll and chest tube if not already done  Note: Chest tube already removed. Bill in place due to retention.        Patient is not progressing towards the following goals:

## 2022-10-15 NOTE — PROGRESS NOTES
Monitor Summary  Rhythm: A Fib/SR  Rate:   Ectopy: oPVCs, ST elevation still present  .15 / .04 / .36

## 2022-10-15 NOTE — PROGRESS NOTES
Patients heart rate jumped up to the 180's. STAT ECG showed AFIB, MD notified with new orders for Amio gtt with bolus. Patient's BP stable with systolic 120-130's

## 2022-10-15 NOTE — PROGRESS NOTES
Assumed care of patient. Bedside report received from day shift  RN. Patient is in bed on 1.5 L NC. Fall precautions in place. Call light and belongings within reach. Updated on POC, all questions and concerns answered at this time. No other needs at this time.

## 2022-10-15 NOTE — CODE DOCUMENTATION
Bedside RN gave amio bolus and gtt started prior to paid being called. Rapid called when patient became hypotensive. 72/50. BP recovered by time rapid team arrived.

## 2022-10-15 NOTE — PROGRESS NOTES
Cardiovascular Surgery Progress Note    Name: Clary Bah  MRN: [id number]  : 1965  Admit Date: 10/6/2022  9:17 PM  3 Days Post-Op     Procedure:  Procedure(s) and Anesthesia Type:     * CORONARY ARTERY BYPASS GRAFTING X2, ENDOSCOPIC VEIN HARVEST - General     * ECHOCARDIOGRAM, TRANSESOPHAGEAL - General    Vitals:  Vitals:    10/15/22 0454 10/15/22 0519 10/15/22 0600 10/15/22 0609   BP: 112/58  125/61 125/61   Pulse: 90  92 89   Resp: 16  16    Temp: 37.2 °C (99 °F)      TempSrc: Temporal      SpO2: 99%      Weight:  96.8 kg (213 lb 6.5 oz)     Height:          Temp (24hrs), Av.3 °C (99.1 °F), Min:36.2 °C (97.2 °F), Max:38 °C (100.4 °F)      Respiratory:    Respiration: 16, Pulse Oximetry: 99 %       Fluids:    Intake/Output Summary (Last 24 hours) at 10/15/2022 0812  Last data filed at 10/15/2022 0700  Gross per 24 hour   Intake 860 ml   Output 1850 ml   Net -990 ml       Admit weight: Weight: 113 kg (249 lb)  Current weight: Weight: 96.8 kg (213 lb 6.5 oz) (10/15/22 0519)    Labs:  Recent Labs     10/13/22  0024 10/14/22  0324 10/15/22  0035   WBC 6.6 9.9 8.6   RBC 3.16* 3.34* 3.05*   HEMOGLOBIN 9.8* 10.3* 9.4*   HEMATOCRIT 28.5* 30.2* 27.2*   MCV 90.2 90.4 89.2   MCH 31.0 30.8 30.8   MCHC 34.4 34.1 34.6   RDW 43.9 43.5 43.0   PLATELETCT 177 209 203   MPV 10.8 10.3 10.3       Recent Labs     10/13/22  0024 10/13/22  0615 10/14/22  0324 10/15/22  0035   SODIUM 139  --  135 138   POTASSIUM 4.5 4.5 4.0 3.8   CHLORIDE 110  --  103 103   CO2 21  --  24 26   GLUCOSE 112*  --  117* 108*   BUN 11  --  11 12   CREATININE 0.63  --  0.51 0.55   CALCIUM 8.1*  --  8.9 8.8       Recent Labs     10/12/22  1241   APTT 32.6   INR 1.28*           Medications:  Scheduled Medications   Medication Dose Frequency    amiodarone  400 mg TWICE DAILY    furosemide  20 mg Q DAY    Pharmacy Consult Request  1 Each PHARMACY TO DOSE    acetaminophen  1,000 mg Q6HRS    senna-docusate  2 Tablet BID    And    polyethylene  glycol/lytes  1 Packet DAILY    And    magnesium hydroxide  30 mL DAILY    omeprazole  20 mg DAILY    clopidogrel  75 mg DAILY    rosuvastatin  20 mg QHS    carvedilol  3.125 mg BID WITH MEALS    losartan  100 mg Q DAY    aspirin EC  81 mg DAILY    levothyroxine  100 mcg AM ES          Exam:   Physical Exam  Constitutional:       General: She is not in acute distress.  HENT:      Head: Normocephalic and atraumatic.      Mouth/Throat:      Mouth: Mucous membranes are moist.      Pharynx: Oropharynx is clear.   Eyes:      Pupils: Pupils are equal, round, and reactive to light.   Cardiovascular:      Rate and Rhythm: Normal rate and regular rhythm.      Pulses: Normal pulses.      Heart sounds:     Friction rub present.   Pulmonary:      Effort: Pulmonary effort is normal.      Breath sounds: Decreased breath sounds present.   Abdominal:      General: Bowel sounds are decreased.      Palpations: Abdomen is soft.      Tenderness: There is no abdominal tenderness.   Musculoskeletal:      Cervical back: Neck supple. No tenderness.   Skin:     General: Skin is warm and dry.      Comments: Sternal incision, prevena in place  SVG site- cdi   Neurological:      General: No focal deficit present.      Mental Status: She is oriented to person, place, and time. Mental status is at baseline.   Psychiatric:         Mood and Affect: Mood normal.         Behavior: Behavior normal.       Cardiac Medications:    ASA - Yes    Plavix - Yes    Post-operative Beta Blockers - Yes    Ace/ARB- Yes    Statin - Yes    Aldactone- No; contraindicated because of Normal EF    Ejection Fraction:  40%    Telemetry:   10/13 SR  10/14 SR, Afib RVR  10/15 SR    Assessment/Plan:  POD 1  HDS, SR, neuro intact, wounds CDI, abdomen soft, fluid balance up, wt up,  3L NC, 290 mL out of chest tubes overnight Plan:  Keep chest tubes and pacing wires, diurese, IS/ambulate.    POD 2  HDS, SR, neuro intact, wounds CDI, abdomen soft, fluid balance negative, wt  down, diuresing well, 100 out of chest tubes overnight.  Plan:  Remove chest tubes, keep pacing wires, IS/ambulate.     POD 3 HDS, SR- Afib w/ RVR yesterday- started on amio- change to PO- d/c pacer wires, cont diuresis- d/c bill, amb/enc IS    Disposition:  10/15 PT/OT cleared for home

## 2022-10-16 LAB
ANION GAP SERPL CALC-SCNC: 9 MMOL/L (ref 7–16)
BUN SERPL-MCNC: 13 MG/DL (ref 8–22)
CALCIUM SERPL-MCNC: 8.8 MG/DL (ref 8.5–10.5)
CHLORIDE SERPL-SCNC: 104 MMOL/L (ref 96–112)
CO2 SERPL-SCNC: 26 MMOL/L (ref 20–33)
CREAT SERPL-MCNC: 0.56 MG/DL (ref 0.5–1.4)
ERYTHROCYTE [DISTWIDTH] IN BLOOD BY AUTOMATED COUNT: 44 FL (ref 35.9–50)
GFR SERPLBLD CREATININE-BSD FMLA CKD-EPI: 107 ML/MIN/1.73 M 2
GLUCOSE SERPL-MCNC: 109 MG/DL (ref 65–99)
HCT VFR BLD AUTO: 28.4 % (ref 37–47)
HGB BLD-MCNC: 9.7 G/DL (ref 12–16)
MAGNESIUM SERPL-MCNC: 2.1 MG/DL (ref 1.5–2.5)
MCH RBC QN AUTO: 30.6 PG (ref 27–33)
MCHC RBC AUTO-ENTMCNC: 34.2 G/DL (ref 33.6–35)
MCV RBC AUTO: 89.6 FL (ref 81.4–97.8)
PLATELET # BLD AUTO: 258 K/UL (ref 164–446)
PMV BLD AUTO: 10.2 FL (ref 9–12.9)
POTASSIUM SERPL-SCNC: 3.4 MMOL/L (ref 3.6–5.5)
RBC # BLD AUTO: 3.17 M/UL (ref 4.2–5.4)
SODIUM SERPL-SCNC: 139 MMOL/L (ref 135–145)
WBC # BLD AUTO: 7.9 K/UL (ref 4.8–10.8)

## 2022-10-16 PROCEDURE — A9270 NON-COVERED ITEM OR SERVICE: HCPCS | Performed by: STUDENT IN AN ORGANIZED HEALTH CARE EDUCATION/TRAINING PROGRAM

## 2022-10-16 PROCEDURE — 700102 HCHG RX REV CODE 250 W/ 637 OVERRIDE(OP): Performed by: NURSE PRACTITIONER

## 2022-10-16 PROCEDURE — A9270 NON-COVERED ITEM OR SERVICE: HCPCS | Performed by: NURSE PRACTITIONER

## 2022-10-16 PROCEDURE — 85027 COMPLETE CBC AUTOMATED: CPT

## 2022-10-16 PROCEDURE — 700102 HCHG RX REV CODE 250 W/ 637 OVERRIDE(OP): Performed by: STUDENT IN AN ORGANIZED HEALTH CARE EDUCATION/TRAINING PROGRAM

## 2022-10-16 PROCEDURE — 700111 HCHG RX REV CODE 636 W/ 250 OVERRIDE (IP): Performed by: NURSE PRACTITIONER

## 2022-10-16 PROCEDURE — 770020 HCHG ROOM/CARE - TELE (206)

## 2022-10-16 PROCEDURE — 700102 HCHG RX REV CODE 250 W/ 637 OVERRIDE(OP): Performed by: CLINICAL NURSE SPECIALIST

## 2022-10-16 PROCEDURE — 36415 COLL VENOUS BLD VENIPUNCTURE: CPT

## 2022-10-16 PROCEDURE — 80048 BASIC METABOLIC PNL TOTAL CA: CPT

## 2022-10-16 PROCEDURE — 94669 MECHANICAL CHEST WALL OSCILL: CPT

## 2022-10-16 PROCEDURE — A9270 NON-COVERED ITEM OR SERVICE: HCPCS | Performed by: CLINICAL NURSE SPECIALIST

## 2022-10-16 PROCEDURE — 83735 ASSAY OF MAGNESIUM: CPT

## 2022-10-16 PROCEDURE — 93005 ELECTROCARDIOGRAM TRACING: CPT | Performed by: THORACIC SURGERY (CARDIOTHORACIC VASCULAR SURGERY)

## 2022-10-16 RX ORDER — POTASSIUM CHLORIDE 20 MEQ/1
40 TABLET, EXTENDED RELEASE ORAL ONCE
Status: COMPLETED | OUTPATIENT
Start: 2022-10-16 | End: 2022-10-16

## 2022-10-16 RX ORDER — GABAPENTIN 100 MG/1
100 CAPSULE ORAL 3 TIMES DAILY
Status: DISCONTINUED | OUTPATIENT
Start: 2022-10-16 | End: 2022-10-18 | Stop reason: HOSPADM

## 2022-10-16 RX ADMIN — OMEPRAZOLE 20 MG: 20 CAPSULE, DELAYED RELEASE ORAL at 04:41

## 2022-10-16 RX ADMIN — ASPIRIN 81 MG: 81 TABLET, COATED ORAL at 04:41

## 2022-10-16 RX ADMIN — CLOPIDOGREL BISULFATE 75 MG: 75 TABLET ORAL at 04:42

## 2022-10-16 RX ADMIN — ACETAMINOPHEN 1000 MG: 500 TABLET ORAL at 00:54

## 2022-10-16 RX ADMIN — GABAPENTIN 100 MG: 100 CAPSULE ORAL at 20:24

## 2022-10-16 RX ADMIN — GABAPENTIN 100 MG: 100 CAPSULE ORAL at 12:07

## 2022-10-16 RX ADMIN — CARVEDILOL 3.12 MG: 3.12 TABLET, FILM COATED ORAL at 17:21

## 2022-10-16 RX ADMIN — ROSUVASTATIN CALCIUM 20 MG: 20 TABLET, FILM COATED ORAL at 20:24

## 2022-10-16 RX ADMIN — CARVEDILOL 3.12 MG: 3.12 TABLET, FILM COATED ORAL at 07:39

## 2022-10-16 RX ADMIN — ACETAMINOPHEN 1000 MG: 500 TABLET ORAL at 04:40

## 2022-10-16 RX ADMIN — AMIODARONE HYDROCHLORIDE 400 MG: 200 TABLET ORAL at 04:41

## 2022-10-16 RX ADMIN — TRAMADOL HYDROCHLORIDE 50 MG: 50 TABLET, COATED ORAL at 23:19

## 2022-10-16 RX ADMIN — POTASSIUM CHLORIDE 40 MEQ: 1500 TABLET, EXTENDED RELEASE ORAL at 09:03

## 2022-10-16 RX ADMIN — LEVOTHYROXINE SODIUM 100 MCG: 0.1 TABLET ORAL at 06:00

## 2022-10-16 RX ADMIN — LOSARTAN POTASSIUM 100 MG: 50 TABLET, FILM COATED ORAL at 04:41

## 2022-10-16 RX ADMIN — FUROSEMIDE 20 MG: 10 INJECTION, SOLUTION INTRAMUSCULAR; INTRAVENOUS at 04:42

## 2022-10-16 RX ADMIN — AMIODARONE HYDROCHLORIDE 400 MG: 200 TABLET ORAL at 17:21

## 2022-10-16 RX ADMIN — GABAPENTIN 100 MG: 100 CAPSULE ORAL at 08:46

## 2022-10-16 RX ADMIN — ACETAMINOPHEN 1000 MG: 500 TABLET ORAL at 11:13

## 2022-10-16 RX ADMIN — ACETAMINOPHEN 1000 MG: 500 TABLET ORAL at 17:21

## 2022-10-16 ASSESSMENT — PAIN DESCRIPTION - PAIN TYPE: TYPE: ACUTE PAIN

## 2022-10-16 ASSESSMENT — FIBROSIS 4 INDEX: FIB4 SCORE: 1.22

## 2022-10-16 NOTE — CARE PLAN
Problem: Post Op Day 4 CABG/Heart Valve Replacement  Goal: Optimal care of the Post Op CABG/Heart Valve replacement Post Op Day 4  Intervention: Daily weights in the morning  Note: Done per protocol  Intervention: Shower daily and clean incisions twice daily with soap and water  Note: Done per protocol  Intervention: Up in chair for all meals  Note: Done per protocol  Intervention: Ambulate 4 times daily, increasing the distance each time  Note: Done per protocol  Intervention: IS q 1 hour while awake and record best IS volume  Note: Done per protocol, 1100  Intervention: Consider removal of bill, chest tube and pacer wires if not already done  Note: Done per protocol  Intervention: Discharge Education  Note: NA  Intervention: Add special instructions for cardiac surgery discharge instructions to after visit summary and review with patient  Note: Done per protocol         Problem: Knowledge Deficit - Standard  Goal: Patient and family/care givers will demonstrate understanding of plan of care, disease process/condition, diagnostic tests and medications  Outcome: Not Progressing     Problem: Pain - Standard  Goal: Alleviation of pain or a reduction in pain to the patient’s comfort goal  Outcome: Not Progressing     Problem: Hemodynamics  Goal: Patient's hemodynamics, fluid balance and neurologic status will be stable or improve  Outcome: Not Progressing     Problem: Respiratory  Goal: Patient will achieve/maintain optimum respiratory ventilation and gas exchange  Outcome: Not Progressing

## 2022-10-16 NOTE — PROGRESS NOTES
Monitor Summary    SR 82-90    Rare PVC. Rare PAC  AFIB from approximately 1230 to 0130 HR up to 170s, then patient self converted back to SR.    0.17/0.06/0.34

## 2022-10-16 NOTE — CARE PLAN
Problem: Post Op Day 3 CABG/Heart Valve replacement  Goal: Optimal care of the post op CABG/Heart Valve replacement post op day 3  Intervention: Daily weights in the morning  Note: Daily weight obtained and charted   Intervention: Shower daily and clean incisions twice daily with soap and water  Note: Patient showered   Intervention: Up in chair for all meals  Note: Up to chair for all meals   Intervention: Ambulate 4 times daily, increasing the distance each time  Note: Pt ambulating halls multiple times today increasing distance each time   Intervention: IS q 1 hour while awake and record best IS volume  Note: Pt able to pull 1750 on IS   Intervention: Consider removal of bill, chest tube and pacer wires if not already done  Note: Bill removed today    The patient is Watcher - Medium risk of patient condition declining or worsening    Shift Goals  Clinical Goals: monitor heart rythm  Patient Goals: rest  Family Goals: KAL    Progress made toward(s) clinical / shift goals:  met     Patient is not progressing towards the following goals:

## 2022-10-16 NOTE — CARE PLAN
The patient is Watcher - Medium risk of patient condition declining or worsening    Shift Goals  Clinical Goals: wean off O2  Patient Goals: rest, comfort  Family Goals: na    Progress made toward(s) clinical / shift goals:    Problem: Post Op Day 3 CABG/Heart Valve replacement  Goal: Optimal care of the post op CABG/Heart Valve replacement post op day 3  Outcome: Progressing  Note: Patient performed incision care and ambulated before getting into bed. Patient reached 1400 on the IS.        Patient is not progressing towards the following goals:

## 2022-10-16 NOTE — DISCHARGE PLANNING
Hospital Care Management- Medical Social Work      LMSW requested HH order from cardiology APRN, per PT/OT recommendations.

## 2022-10-16 NOTE — PROGRESS NOTES
Patient converted to Afib RVR with heart rate sustaining in the 140s. Stat EKG ordered. CT Surg notified.  Per Ana OCAMPO, restart IV amiodarone drip with bolus, and continue on oral amiodarone as ordered.

## 2022-10-16 NOTE — PROGRESS NOTES
Assumed care of patient, received bedside report from Wero KEITA. Patient is A&O X 4. Pain 1/10. Vital signs stable during day shift, on 0.5 L of oxygen NC. On tele monitor, SR 82. POC discussed with patient. Patient verbalized understanding. All questions answered. Call light within reach and fall precautions in place. Bed alarm on, bed locked and in lowest position.

## 2022-10-17 LAB
ANION GAP SERPL CALC-SCNC: 10 MMOL/L (ref 7–16)
BUN SERPL-MCNC: 13 MG/DL (ref 8–22)
CALCIUM SERPL-MCNC: 8.9 MG/DL (ref 8.5–10.5)
CHLORIDE SERPL-SCNC: 108 MMOL/L (ref 96–112)
CO2 SERPL-SCNC: 24 MMOL/L (ref 20–33)
CREAT SERPL-MCNC: 0.73 MG/DL (ref 0.5–1.4)
EKG IMPRESSION: NORMAL
ERYTHROCYTE [DISTWIDTH] IN BLOOD BY AUTOMATED COUNT: 45.4 FL (ref 35.9–50)
GFR SERPLBLD CREATININE-BSD FMLA CKD-EPI: 96 ML/MIN/1.73 M 2
GLUCOSE SERPL-MCNC: 105 MG/DL (ref 65–99)
HCT VFR BLD AUTO: 28.2 % (ref 37–47)
HGB BLD-MCNC: 9.6 G/DL (ref 12–16)
MAGNESIUM SERPL-MCNC: 2 MG/DL (ref 1.5–2.5)
MCH RBC QN AUTO: 30.7 PG (ref 27–33)
MCHC RBC AUTO-ENTMCNC: 34 G/DL (ref 33.6–35)
MCV RBC AUTO: 90.1 FL (ref 81.4–97.8)
PLATELET # BLD AUTO: 321 K/UL (ref 164–446)
PMV BLD AUTO: 9.5 FL (ref 9–12.9)
POTASSIUM SERPL-SCNC: 3.6 MMOL/L (ref 3.6–5.5)
RBC # BLD AUTO: 3.13 M/UL (ref 4.2–5.4)
SODIUM SERPL-SCNC: 142 MMOL/L (ref 135–145)
WBC # BLD AUTO: 7.7 K/UL (ref 4.8–10.8)

## 2022-10-17 PROCEDURE — 83735 ASSAY OF MAGNESIUM: CPT

## 2022-10-17 PROCEDURE — A9270 NON-COVERED ITEM OR SERVICE: HCPCS | Performed by: NURSE PRACTITIONER

## 2022-10-17 PROCEDURE — 700102 HCHG RX REV CODE 250 W/ 637 OVERRIDE(OP): Performed by: NURSE PRACTITIONER

## 2022-10-17 PROCEDURE — 93010 ELECTROCARDIOGRAM REPORT: CPT | Mod: 76 | Performed by: INTERNAL MEDICINE

## 2022-10-17 PROCEDURE — 700111 HCHG RX REV CODE 636 W/ 250 OVERRIDE (IP): Performed by: NURSE PRACTITIONER

## 2022-10-17 PROCEDURE — 700102 HCHG RX REV CODE 250 W/ 637 OVERRIDE(OP): Performed by: CLINICAL NURSE SPECIALIST

## 2022-10-17 PROCEDURE — A9270 NON-COVERED ITEM OR SERVICE: HCPCS | Performed by: CLINICAL NURSE SPECIALIST

## 2022-10-17 PROCEDURE — 700102 HCHG RX REV CODE 250 W/ 637 OVERRIDE(OP): Performed by: STUDENT IN AN ORGANIZED HEALTH CARE EDUCATION/TRAINING PROGRAM

## 2022-10-17 PROCEDURE — A9270 NON-COVERED ITEM OR SERVICE: HCPCS | Performed by: STUDENT IN AN ORGANIZED HEALTH CARE EDUCATION/TRAINING PROGRAM

## 2022-10-17 PROCEDURE — 36415 COLL VENOUS BLD VENIPUNCTURE: CPT

## 2022-10-17 PROCEDURE — 99024 POSTOP FOLLOW-UP VISIT: CPT | Performed by: NURSE PRACTITIONER

## 2022-10-17 PROCEDURE — 93005 ELECTROCARDIOGRAM TRACING: CPT | Performed by: NURSE PRACTITIONER

## 2022-10-17 PROCEDURE — 93010 ELECTROCARDIOGRAM REPORT: CPT | Performed by: INTERNAL MEDICINE

## 2022-10-17 PROCEDURE — 700101 HCHG RX REV CODE 250

## 2022-10-17 PROCEDURE — 80048 BASIC METABOLIC PNL TOTAL CA: CPT

## 2022-10-17 PROCEDURE — 85027 COMPLETE CBC AUTOMATED: CPT

## 2022-10-17 PROCEDURE — 770020 HCHG ROOM/CARE - TELE (206)

## 2022-10-17 RX ORDER — CARVEDILOL 12.5 MG/1
12.5 TABLET ORAL 2 TIMES DAILY WITH MEALS
Status: DISCONTINUED | OUTPATIENT
Start: 2022-10-17 | End: 2022-10-18 | Stop reason: HOSPADM

## 2022-10-17 RX ORDER — METOPROLOL TARTRATE 1 MG/ML
INJECTION, SOLUTION INTRAVENOUS
Status: COMPLETED
Start: 2022-10-17 | End: 2022-10-17

## 2022-10-17 RX ORDER — METOPROLOL TARTRATE 1 MG/ML
5 INJECTION, SOLUTION INTRAVENOUS
Status: DISCONTINUED | OUTPATIENT
Start: 2022-10-17 | End: 2022-10-18 | Stop reason: HOSPADM

## 2022-10-17 RX ORDER — CARVEDILOL 6.25 MG/1
6.25 TABLET ORAL 2 TIMES DAILY WITH MEALS
Status: DISCONTINUED | OUTPATIENT
Start: 2022-10-17 | End: 2022-10-17

## 2022-10-17 RX ADMIN — METOPROLOL TARTRATE 5 MG: 1 INJECTION, SOLUTION INTRAVENOUS at 06:16

## 2022-10-17 RX ADMIN — ACETAMINOPHEN 1000 MG: 500 TABLET ORAL at 11:37

## 2022-10-17 RX ADMIN — ROSUVASTATIN CALCIUM 20 MG: 20 TABLET, FILM COATED ORAL at 20:00

## 2022-10-17 RX ADMIN — AMIODARONE HYDROCHLORIDE 400 MG: 200 TABLET ORAL at 05:13

## 2022-10-17 RX ADMIN — LEVOTHYROXINE SODIUM 100 MCG: 0.1 TABLET ORAL at 05:13

## 2022-10-17 RX ADMIN — CLOPIDOGREL BISULFATE 75 MG: 75 TABLET ORAL at 05:12

## 2022-10-17 RX ADMIN — ASPIRIN 81 MG: 81 TABLET, COATED ORAL at 05:13

## 2022-10-17 RX ADMIN — GABAPENTIN 100 MG: 100 CAPSULE ORAL at 11:37

## 2022-10-17 RX ADMIN — ACETAMINOPHEN 1000 MG: 500 TABLET ORAL at 06:00

## 2022-10-17 RX ADMIN — SENNOSIDES AND DOCUSATE SODIUM 2 TABLET: 50; 8.6 TABLET ORAL at 17:18

## 2022-10-17 RX ADMIN — LOSARTAN POTASSIUM 100 MG: 50 TABLET, FILM COATED ORAL at 05:13

## 2022-10-17 RX ADMIN — OMEPRAZOLE 20 MG: 20 CAPSULE, DELAYED RELEASE ORAL at 05:12

## 2022-10-17 RX ADMIN — GABAPENTIN 100 MG: 100 CAPSULE ORAL at 17:18

## 2022-10-17 RX ADMIN — FUROSEMIDE 20 MG: 10 INJECTION, SOLUTION INTRAMUSCULAR; INTRAVENOUS at 05:13

## 2022-10-17 RX ADMIN — CARVEDILOL 12.5 MG: 12.5 TABLET, FILM COATED ORAL at 17:18

## 2022-10-17 RX ADMIN — AMIODARONE HYDROCHLORIDE 400 MG: 200 TABLET ORAL at 17:18

## 2022-10-17 RX ADMIN — ACETAMINOPHEN 1000 MG: 500 TABLET ORAL at 00:00

## 2022-10-17 RX ADMIN — CARVEDILOL 6.25 MG: 6.25 TABLET, FILM COATED ORAL at 08:03

## 2022-10-17 RX ADMIN — GABAPENTIN 100 MG: 100 CAPSULE ORAL at 05:13

## 2022-10-17 ASSESSMENT — FIBROSIS 4 INDEX: FIB4 SCORE: 0.98

## 2022-10-17 ASSESSMENT — COGNITIVE AND FUNCTIONAL STATUS - GENERAL
DAILY ACTIVITIY SCORE: 24
SUGGESTED CMS G CODE MODIFIER DAILY ACTIVITY: CH

## 2022-10-17 ASSESSMENT — PAIN DESCRIPTION - PAIN TYPE: TYPE: SURGICAL PAIN

## 2022-10-17 NOTE — PROGRESS NOTES
Cardiovascular Surgery Progress Note    Name: Clary Bah  MRN: [id number]  : 1965  Admit Date: 10/6/2022  9:17 PM  5 Days Post-Op     Procedure:  Procedure(s) and Anesthesia Type:     * CORONARY ARTERY BYPASS GRAFTING X2, ENDOSCOPIC VEIN HARVEST - General     * ECHOCARDIOGRAM, TRANSESOPHAGEAL - General    Vitals:  Vitals:    10/17/22 0624 10/17/22 0729 10/17/22 0750 10/17/22 08   BP: (!) 150/89  116/68    Pulse: 78 80 81    Resp: 18 18 18    Temp:   36.6 °C (97.9 °F)    TempSrc:   Temporal    SpO2: 96% 96% 97%    Weight:    95.9 kg (211 lb 6.7 oz)   Height:          Temp (24hrs), Av.8 °C (98.3 °F), Min:36.6 °C (97.9 °F), Max:37.2 °C (99 °F)      Respiratory:    Respiration: 18, Pulse Oximetry: 97 %       Fluids:    Intake/Output Summary (Last 24 hours) at 10/17/2022 0955  Last data filed at 10/17/2022 0826  Gross per 24 hour   Intake --   Output 800 ml   Net -800 ml       Admit weight: Weight: 113 kg (249 lb)  Current weight: Weight: 95.9 kg (211 lb 6.7 oz) (10/17/22 0825)    Labs:  Recent Labs     10/15/22  0035 10/16/22  0208 10/17/22  0145   WBC 8.6 7.9 7.7   RBC 3.05* 3.17* 3.13*   HEMOGLOBIN 9.4* 9.7* 9.6*   HEMATOCRIT 27.2* 28.4* 28.2*   MCV 89.2 89.6 90.1   MCH 30.8 30.6 30.7   MCHC 34.6 34.2 34.0   RDW 43.0 44.0 45.4   PLATELETCT 203 258 321   MPV 10.3 10.2 9.5       Recent Labs     10/15/22  0035 10/16/22  0208 10/17/22  0145   SODIUM 138 139 142   POTASSIUM 3.8 3.4* 3.6   CHLORIDE 103 104 108   CO2 26 26 24   GLUCOSE 108* 109* 105*   BUN 12 13 13   CREATININE 0.55 0.56 0.73   CALCIUM 8.8 8.8 8.9                 Medications:  Scheduled Medications   Medication Dose Frequency    carvedilol  6.25 mg BID WITH MEALS    gabapentin  100 mg TID    amiodarone  400 mg TWICE DAILY    furosemide  20 mg Q DAY    Pharmacy Consult Request  1 Each PHARMACY TO DOSE    acetaminophen  1,000 mg Q6HRS    senna-docusate  2 Tablet BID    And    polyethylene glycol/lytes  1 Packet DAILY    And    magnesium  hydroxide  30 mL DAILY    omeprazole  20 mg DAILY    clopidogrel  75 mg DAILY    rosuvastatin  20 mg QHS    losartan  100 mg Q DAY    aspirin EC  81 mg DAILY    levothyroxine  100 mcg AM ES          Exam:   Physical Exam  Constitutional:       General: She is not in acute distress.  HENT:      Head: Normocephalic and atraumatic.      Mouth/Throat:      Mouth: Mucous membranes are moist.      Pharynx: Oropharynx is clear.   Eyes:      Pupils: Pupils are equal, round, and reactive to light.   Cardiovascular:      Rate and Rhythm: Normal rate and regular rhythm.      Pulses: Normal pulses.   Pulmonary:      Effort: Pulmonary effort is normal.      Breath sounds: Decreased breath sounds present.   Abdominal:      General: Bowel sounds are normal.      Palpations: Abdomen is soft.      Tenderness: There is no abdominal tenderness.   Musculoskeletal:      Cervical back: Neck supple. No tenderness.   Skin:     General: Skin is warm and dry.      Comments: Sternal incision, prevena in place  SVG site- cdi   Neurological:      General: No focal deficit present.      Mental Status: She is oriented to person, place, and time. Mental status is at baseline.   Psychiatric:         Mood and Affect: Mood normal.         Behavior: Behavior normal.       Cardiac Medications:    ASA - Yes    Plavix - Yes    Post-operative Beta Blockers - Yes    Ace/ARB- Yes    Statin - Yes    Aldactone- No; contraindicated because of Normal EF    Ejection Fraction:  40%    Telemetry:   10/13 SR  10/14 SR, Afib RVR  10/15 SR  10/16 SR one episode of non-sustained afib  10/17 SR SVT this AM    Assessment/Plan:  POD 1  HDS, SR, neuro intact, wounds CDI, abdomen soft, fluid balance up, wt up,  3L NC, 290 mL out of chest tubes overnight Plan:  Keep chest tubes and pacing wires, diurese, IS/ambulate.    POD 2  HDS, SR, neuro intact, wounds CDI, abdomen soft, fluid balance negative, wt down, diuresing well, 100 out of chest tubes overnight.  Plan:  Remove  chest tubes, keep pacing wires, IS/ambulate.     POD 3 HDS, SR- Afib w/ RVR yesterday- started on amio- change to PO- d/c pacer wires, cont diuresis- d/c bill, amb/enc IS    POD 4 HDS, SR- one episode of non-sustained afib- on PO amio, diuresed well- cont, right sided burning rib pain- start Neurontin, Room air, ambulating, plan home in am if rhythm stable    POD 5 HDS.  Episode of SVT overnight requiring IV push of metoprolol.  1LNC.  Cr 0.73 Hgb 9.6 Mg normal.  K normal.  Plan: Wean oxygen. Increase coreg dose.        Disposition:  10/15 PT/OT cleared for home

## 2022-10-17 NOTE — PROGRESS NOTES
Bedside report received from NOC RN. Pt A&Ox4. Sinus rhythm 72 on the monitor. On 2 liters silicone nasal cannula. No current complaints of pain. Sites assessed. POC discussed with pt. Pt verbalizes understanding. Call light and belongings within reach. Bed locked and in lowest position. Alarm and fall precautions in place.

## 2022-10-17 NOTE — THERAPY
Occupational Therapy  Daily Treatment     Patient Name: Clary Bah  Age:  57 y.o., Sex:  female  Medical Record #: [id number]  Today's Date: 10/17/2022       Precautions: Fall Risk, Sternal Precautions (See Comments), Cardiac Precautions (See Comments)  Comments: s/p CABG    Assessment    Pt currently is up showering with Nsg.  Pt has been up amb in room & halls with supervision.  Pt has met OT goals.  Pt has no further Acute OT needs.  Pt reports her family can assist if needed upon D/C home.    Plan    Discharge secondary to goals met.    DC Equipment Recommendations: Tub / Shower Seat  Discharge Recommendations: Anticipate that the patient will have no further occupational therapy needs after discharge from the hospital       Objective       10/17/22 1449   Other Treatments   Other Treatments Provided Pt is up showering with set up with Nsg, pt is able to complete basic ADL's with supervision & reports she will have help from family upon D/C home.  Plan is for pt to D/C home tomorrow.  Pt has met OT goals   Balance   Sitting Balance (Static) Good   Sitting Balance (Dynamic) Good   Standing Balance (Static) Good   Standing Balance (Dynamic) Good   Weight Shift Sitting Good   Weight Shift Standing Good   Activities of Daily Living   Eating Modified Independent   Grooming Supervision;Standing   Bathing Supervision   Upper Body Dressing Supervision   Lower Body Dressing Supervision   Toileting Supervision   Patient / Family Goals   Patient / Family Goal #1 to go home   Goal #1 Outcome Progressing as expected   Short Term Goals   Short Term Goal # 1 standing g/h with SPV >1 min   Goal Outcome # 1 Goal met   Short Term Goal # 2 toilet txf with SPV   Goal Outcome # 2 Goal met   Short Term Goal # 3 verbalize and maintain sternal precautions during ADLs w/o v/cs.   Goal Outcome # 3 Goal met   Short Term Goal # 4 LB dressing with SPV   Goal Outcome # 4 Goal met   Session Information   Date / Session Number  10/17- D/C OT  goal met

## 2022-10-17 NOTE — PROGRESS NOTES
Change in patient condition due to: Cardiac  Rapid Response called at: 0539  Physician Ana Figueroa notified at 0537    See Code Blue timeline for rapid response events. Patient Remained on Unit    CT surg paged. Orders received, given 5mg metoprolol. Pt converted to SR 70s-80s. VSS.

## 2022-10-17 NOTE — PROGRESS NOTES
This RN got patient up to go to the bathroom. Received call from monitors that patient had rate in 200s at 0527. Patient safetly wheeled back to their bed, EKG done and patient confirmed to be in SVT. BP asymptomatic, pt not experiencing dizziness. CT surgery paged at 0539 as SVT was sustaining. Defibrillator pads placed on patient. Rapid called at 0539. See Code Blue timeline for response response events. CT surgery stat paged again at 0558. CT surgery paged again at 0604, received orders for and gave IV metoprolol. Patient converted to SR 80s. Another EKG obtained.

## 2022-10-17 NOTE — DISCHARGE PLANNING
Case Management Discharge Planning    Admission Date: 10/6/2022  GMLOS: 11.3  ALOS: 11    6-Clicks ADL Score: 17  6-Clicks Mobility Score: 18  PT and/or OT Eval ordered: Yes  Post-acute Referrals Ordered: No  Post-acute Choice Obtained: Yes  Has referral(s) been sent to post-acute provider:  No      Anticipated Discharge Dispo: Discharge Disposition: Discharged to home/self care (01)    DME Needed: No    Action(s) Taken: LSW met with pt at bedside for HHC choice. Pt reported she does not want HHC as she does not feel she needs it at this time. Pt will be discharging to her sisters who will be available to assist pt as needed.    Escalations Completed: None    Medically Clear: No    Next Steps: No DC needs identified at this time    Barriers to Discharge: Medical clearance    Is the patient up for discharge tomorrow: No

## 2022-10-18 VITALS
TEMPERATURE: 98.1 F | DIASTOLIC BLOOD PRESSURE: 80 MMHG | HEART RATE: 88 BPM | BODY MASS INDEX: 33.84 KG/M2 | RESPIRATION RATE: 18 BRPM | HEIGHT: 66 IN | OXYGEN SATURATION: 96 % | WEIGHT: 210.54 LBS | SYSTOLIC BLOOD PRESSURE: 147 MMHG

## 2022-10-18 PROBLEM — I16.0 HYPERTENSIVE URGENCY: Status: RESOLVED | Noted: 2022-10-06 | Resolved: 2022-10-18

## 2022-10-18 PROBLEM — R07.9 PAIN IN THE CHEST: Status: RESOLVED | Noted: 2022-10-07 | Resolved: 2022-10-18

## 2022-10-18 PROBLEM — E87.6 HYPOKALEMIA: Status: RESOLVED | Noted: 2022-10-07 | Resolved: 2022-10-18

## 2022-10-18 LAB
ANION GAP SERPL CALC-SCNC: 10 MMOL/L (ref 7–16)
BUN SERPL-MCNC: 13 MG/DL (ref 8–22)
CALCIUM SERPL-MCNC: 9.3 MG/DL (ref 8.5–10.5)
CHLORIDE SERPL-SCNC: 108 MMOL/L (ref 96–112)
CO2 SERPL-SCNC: 24 MMOL/L (ref 20–33)
CREAT SERPL-MCNC: 0.73 MG/DL (ref 0.5–1.4)
ERYTHROCYTE [DISTWIDTH] IN BLOOD BY AUTOMATED COUNT: 45.8 FL (ref 35.9–50)
GFR SERPLBLD CREATININE-BSD FMLA CKD-EPI: 96 ML/MIN/1.73 M 2
GLUCOSE SERPL-MCNC: 106 MG/DL (ref 65–99)
HCT VFR BLD AUTO: 30.1 % (ref 37–47)
HGB BLD-MCNC: 10.1 G/DL (ref 12–16)
MCH RBC QN AUTO: 30.5 PG (ref 27–33)
MCHC RBC AUTO-ENTMCNC: 33.6 G/DL (ref 33.6–35)
MCV RBC AUTO: 90.9 FL (ref 81.4–97.8)
PLATELET # BLD AUTO: 350 K/UL (ref 164–446)
PMV BLD AUTO: 9.4 FL (ref 9–12.9)
POTASSIUM SERPL-SCNC: 3.9 MMOL/L (ref 3.6–5.5)
RBC # BLD AUTO: 3.31 M/UL (ref 4.2–5.4)
SODIUM SERPL-SCNC: 142 MMOL/L (ref 135–145)
WBC # BLD AUTO: 8 K/UL (ref 4.8–10.8)

## 2022-10-18 PROCEDURE — 700111 HCHG RX REV CODE 636 W/ 250 OVERRIDE (IP): Performed by: NURSE PRACTITIONER

## 2022-10-18 PROCEDURE — 700102 HCHG RX REV CODE 250 W/ 637 OVERRIDE(OP): Performed by: NURSE PRACTITIONER

## 2022-10-18 PROCEDURE — 85027 COMPLETE CBC AUTOMATED: CPT

## 2022-10-18 PROCEDURE — A9270 NON-COVERED ITEM OR SERVICE: HCPCS | Performed by: NURSE PRACTITIONER

## 2022-10-18 PROCEDURE — 700102 HCHG RX REV CODE 250 W/ 637 OVERRIDE(OP): Performed by: CLINICAL NURSE SPECIALIST

## 2022-10-18 PROCEDURE — 80048 BASIC METABOLIC PNL TOTAL CA: CPT

## 2022-10-18 PROCEDURE — A9270 NON-COVERED ITEM OR SERVICE: HCPCS | Performed by: CLINICAL NURSE SPECIALIST

## 2022-10-18 PROCEDURE — A9270 NON-COVERED ITEM OR SERVICE: HCPCS | Performed by: STUDENT IN AN ORGANIZED HEALTH CARE EDUCATION/TRAINING PROGRAM

## 2022-10-18 PROCEDURE — 700102 HCHG RX REV CODE 250 W/ 637 OVERRIDE(OP): Performed by: STUDENT IN AN ORGANIZED HEALTH CARE EDUCATION/TRAINING PROGRAM

## 2022-10-18 PROCEDURE — 36415 COLL VENOUS BLD VENIPUNCTURE: CPT

## 2022-10-18 RX ORDER — ROSUVASTATIN CALCIUM 20 MG/1
20 TABLET, COATED ORAL
Qty: 30 TABLET | Refills: 2 | Status: SHIPPED | OUTPATIENT
Start: 2022-10-18 | End: 2023-01-17 | Stop reason: SDUPTHER

## 2022-10-18 RX ORDER — SPIRONOLACTONE 25 MG/1
25 TABLET ORAL DAILY
Qty: 30 TABLET | Refills: 3 | Status: SHIPPED | OUTPATIENT
Start: 2022-10-18 | End: 2023-01-17 | Stop reason: SDUPTHER

## 2022-10-18 RX ORDER — AMIODARONE HYDROCHLORIDE 400 MG/1
400 TABLET ORAL 2 TIMES DAILY
Qty: 56 TABLET | Refills: 1 | Status: SHIPPED
Start: 2022-10-18 | End: 2022-11-03

## 2022-10-18 RX ORDER — CLOPIDOGREL BISULFATE 75 MG/1
75 TABLET ORAL DAILY
Qty: 30 TABLET | Refills: 2 | Status: SHIPPED | OUTPATIENT
Start: 2022-10-19 | End: 2023-01-17 | Stop reason: SDUPTHER

## 2022-10-18 RX ORDER — ACETAMINOPHEN 500 MG
1000 TABLET ORAL EVERY 6 HOURS PRN
Qty: 30 TABLET | Refills: 0 | COMMUNITY
Start: 2022-10-18

## 2022-10-18 RX ORDER — SPIRONOLACTONE 25 MG/1
25 TABLET ORAL
Status: DISCONTINUED | OUTPATIENT
Start: 2022-10-18 | End: 2022-10-18

## 2022-10-18 RX ORDER — ASPIRIN 81 MG/1
81 TABLET ORAL DAILY
Qty: 30 TABLET | COMMUNITY
Start: 2022-10-19 | End: 2023-08-09

## 2022-10-18 RX ORDER — GABAPENTIN 100 MG/1
100 CAPSULE ORAL 3 TIMES DAILY
Qty: 90 CAPSULE | Refills: 1 | Status: SHIPPED
Start: 2022-10-18 | End: 2023-02-16

## 2022-10-18 RX ORDER — TRAMADOL HYDROCHLORIDE 50 MG/1
50 TABLET ORAL EVERY 8 HOURS PRN
Qty: 42 TABLET | Refills: 0 | Status: SHIPPED | OUTPATIENT
Start: 2022-10-18 | End: 2022-11-01

## 2022-10-18 RX ADMIN — CLOPIDOGREL BISULFATE 75 MG: 75 TABLET ORAL at 06:33

## 2022-10-18 RX ADMIN — OMEPRAZOLE 20 MG: 20 CAPSULE, DELAYED RELEASE ORAL at 06:33

## 2022-10-18 RX ADMIN — MAGNESIUM HYDROXIDE 30 ML: 400 SUSPENSION ORAL at 06:33

## 2022-10-18 RX ADMIN — CARVEDILOL 12.5 MG: 12.5 TABLET, FILM COATED ORAL at 06:33

## 2022-10-18 RX ADMIN — AMIODARONE HYDROCHLORIDE 400 MG: 200 TABLET ORAL at 06:33

## 2022-10-18 RX ADMIN — GABAPENTIN 100 MG: 100 CAPSULE ORAL at 06:33

## 2022-10-18 RX ADMIN — FUROSEMIDE 20 MG: 10 INJECTION, SOLUTION INTRAMUSCULAR; INTRAVENOUS at 06:34

## 2022-10-18 RX ADMIN — LEVOTHYROXINE SODIUM 100 MCG: 0.1 TABLET ORAL at 06:33

## 2022-10-18 RX ADMIN — LOSARTAN POTASSIUM 100 MG: 50 TABLET, FILM COATED ORAL at 06:33

## 2022-10-18 RX ADMIN — GABAPENTIN 100 MG: 100 CAPSULE ORAL at 12:02

## 2022-10-18 RX ADMIN — ASPIRIN 81 MG: 81 TABLET, COATED ORAL at 06:33

## 2022-10-18 ASSESSMENT — FIBROSIS 4 INDEX: FIB4 SCORE: 0.9

## 2022-10-18 NOTE — CARE PLAN
"  Problem: Post Op Day 5 CABG/Heart Valve Replacement  Goal: Optimal care of the Post Op CABG/Heart Valve replacement Post Op Day 5  Outcome: Progressing  Intervention: Daily weights in the morning  Note: 95.9 kg standup scale   Intervention: Shower daily and clean incisions twice daily with soap and water  Note: Patient showered and washed incision site   Intervention: Up in chair for all meals  Note: Patient up in chair for all meals   Intervention: Ambulate 4 times daily, increasing the distance each time  Note: Patient ambulated the halls x 3 increasing distance each time  Intervention: IS q 1 hour while awake and record best IS volume  Note: IS up to 2000 mL  Intervention: Complete \"Home O2 Assessment' in vitals flowsheets if unable to wean off O2  Flowsheets (Taken 10/17/2022 1300)  Room air sat at rest: 96  Room air sat with amb: 94  Is the patient mobile?: Yes  Note: Patient ambulated the halls on room air not requiring any oxygen   Intervention: Consider removal of bill, chest tube and pacer wires if not already done  Note: Removed prior to transfer   Intervention: Discharge Education  Note: Discussed discharge planning with patient   Intervention: Add special instructions for cardiac surgery discharge instructions to after visit summary and review with patient  Note: Will be added at discharge      The patient is Watcher - Medium risk of patient condition declining or worsening    Shift Goals: Monitor heart rate   Clinical Goals: Monitor heart rate and rhythm, ambulate, sit in chair for meals,Titrate oxygen safely  Patient Goals: Rest  Family Goals: na    Progress made toward(s) clinical / shift goals:  patient ambulated the halls x 3 increasing distance each time on room air    Patient is not progressing towards the following goals: tachycardia up to 130 non sustaining at initial stand       "

## 2022-10-18 NOTE — PROGRESS NOTES
Bedside report received from Ravindra JONES RN. Pt A&Ox4. Sinus rhythm on the monitor. On room air. POC discussed with pt. Pt verbalizes understanding. Call light and belongings within reach. Bed locked and in lowest position. Alarm and fall precautions in place.

## 2022-10-18 NOTE — CARE PLAN
Problem: Post Op Day 6 CABG/Heart Valve Replacement  Goal: Optimal care of the Post Op CABG/Heart Valve replacement Post Op Day 6  Note: Patient ambulated the halls x 2, increasing distance each time, IS up to 2000 mL, sitting up in chair for all meals, discharge education completed, patient will shower at home.      The patient is Stable - Low risk of patient condition declining or worsening    Shift Goals: Discharge education  Clinical Goals: Monitor heart rate and rhythm  Patient Goals: Discharge planning  Family Goals: na    Progress made toward(s) clinical / shift goals:  Discharge education completed     Patient is not progressing towards the following goals: Patient worried about being able to move alone at home, education provided on mobility

## 2022-10-18 NOTE — CARE PLAN
Problem: Knowledge Deficit - Standard  Goal: Patient and family/care givers will demonstrate understanding of plan of care, disease process/condition, diagnostic tests and medications  Outcome: Progressing     Problem: Pain - Standard  Goal: Alleviation of pain or a reduction in pain to the patient’s comfort goal  Outcome: Progressing     Problem: Hemodynamics  Goal: Patient's hemodynamics, fluid balance and neurologic status will be stable or improve  Outcome: Progressing     Problem: Respiratory  Goal: Patient will achieve/maintain optimum respiratory ventilation and gas exchange  Outcome: Progressing     Problem: Mobility  Goal: Patient's capacity to carry out activities will improve  Outcome: Progressing     Problem: Post Op Day 5 CABG/Heart Valve Replacement  Goal: Optimal care of the Post Op CABG/Heart Valve replacement Post Op Day 5  Outcome: Progressing   The patient is Stable - Low risk of patient condition declining or worsening    Shift Goals  Clinical Goals: monitor telemetry, ambulate  Patient Goals: Rest  Family Goals: na    Progress made toward(s) clinical / shift goals:  Progressing    Patient is not progressing towards the following goals:

## 2022-10-18 NOTE — DISCHARGE SUMMARY
DISCHARGE SUMMARY    ADMISSION DATE: 10/6/2022    DISCHARGE DATE: 10/18/2022    ADMITTING DIAGNOSES: multivessel CAD, NSTEMI, HTN, obesity, hypothyroid    DISCHARGE DIAGNOSES: multivessel CAD, NSTEMI, HTN, obesity, hypothyroid, atrial fibrillation.    PROCEDURES PERFORMED: Coronary artery bypass grafting x2  Left internal mammary artery to left anterior descending artery  Reversed saphenous vein graft to postero-lateral artery  Endo-vein procurement    HISTORY OF PRESENT ILLNESS:  The patient is a 56 y.o. female with a past medical history of hypertension, SVT, palpitations, hypothyroidism who presented to the ER on 10/6/22 with off and on substernal sharp chest pain over the last week. It is partially relieved with rest.  She has recently been stressed due to family tragedy.  Her repeat troponins in the ER were elevated with some ischemic changes on her ECG bu no ST elevation.  She was seen by cardiology and taken to the cath lab where she was found to have multivessel disease. She was also diagnosed with HTNsive emergency and has been treated for that.She is ambulatory and able to do ADL at baseline. She lives with her brother, two adult sons and their children (her grandkids).    HOSPITAL COURSE:   POD 1  HDS, SR, neuro intact, wounds CDI, abdomen soft, fluid balance up, wt up,  3L NC, 290 mL out of chest tubes overnight Plan:  Keep chest tubes and pacing wires, diurese, IS/ambulate.     POD 2  HDS, SR, neuro intact, wounds CDI, abdomen soft, fluid balance negative, wt down, diuresing well, 100 out of chest tubes overnight.  Plan:  Remove chest tubes, keep pacing wires, IS/ambulate.      POD 3 HDS, SR- Afib w/ RVR yesterday- started on amio- change to PO- d/c pacer wires, cont diuresis- d/c bill, amb/enc IS     POD 4 HDS, SR- one episode of non-sustained afib- on PO amio, diuresed well- cont, right sided burning rib pain- start Neurontin, Room air, ambulating, plan home in am if rhythm stable     POD 5 HDS.   Episode of SVT overnight requiring IV push of metoprolol.  1LNC.  Cr 0.73 Hgb 9.6 Mg normal.  K normal.  Plan: Wean oxygen. Increase coreg dose.      POD 6  HDS, SR over 24 hours, neuro intact, wounds CDI, abdomen S/NT, fluid balance negative, wt down,  on RA ambulating halls.  Plan:  discharge home today.        TELEMETRY:SR    RECENT LABS:     Lab Results   Component Value Date/Time    SODIUM 142 10/18/2022 03:10 AM    POTASSIUM 3.9 10/18/2022 03:10 AM    CHLORIDE 108 10/18/2022 03:10 AM    CO2 24 10/18/2022 03:10 AM    GLUCOSE 106 (H) 10/18/2022 03:10 AM    BUN 13 10/18/2022 03:10 AM    CREATININE 0.73 10/18/2022 03:10 AM    CREATININE 0.72 01/21/2011 10:15 AM    BUNCREATRAT 19 07/06/2018 07:52 AM    BUNCREATRAT 15 01/21/2011 10:15 AM    GLOMRATE >59 01/21/2011 10:15 AM      Lab Results   Component Value Date/Time    WBC 8.0 10/18/2022 03:10 AM    WBC 6.7 01/21/2011 10:15 AM    RBC 3.31 (L) 10/18/2022 03:10 AM    RBC 5.11 (H) 01/21/2011 10:15 AM    HEMOGLOBIN 10.1 (L) 10/18/2022 03:10 AM    HEMATOCRIT 30.1 (L) 10/18/2022 03:10 AM    MCV 90.9 10/18/2022 03:10 AM    MCV 90 01/21/2011 10:15 AM    MCH 30.5 10/18/2022 03:10 AM    MCH 30.1 01/21/2011 10:15 AM    MCHC 33.6 10/18/2022 03:10 AM    MPV 9.4 10/18/2022 03:10 AM    NEUTSPOLYS 65.10 10/11/2022 08:32 PM    LYMPHOCYTES 20.20 (L) 10/11/2022 08:32 PM    MONOCYTES 11.70 10/11/2022 08:32 PM    EOSINOPHILS 2.00 10/11/2022 08:32 PM    BASOPHILS 0.50 10/11/2022 08:32 PM      Lab Results   Component Value Date/Time    PROTHROMBTM 15.8 (H) 10/12/2022 12:41 PM    INR 1.28 (H) 10/12/2022 12:41 PM        ALLERGIES:     Ace inhibitors and Atorvastatin    EJECTION FRACTION:  40%    CARDIAC MEDICATIONS:    ASA - Yes    Plavix - Yes    Post-operative Beta Blockers - Yes    Ace/ARB- Yes    Statin - Yes    Aldactone- No; contraindicated because of Other on arb risk of hyperkalemia.    DISCHARGE MEDICATIONS:      Medication List        START taking these medications         "Instructions   acetaminophen 500 MG Tabs  Commonly known as: TYLENOL   Take 2 Tablets by mouth every 6 hours as needed for Moderate Pain.  Dose: 1,000 mg     amiodarone 400 MG tablet  Commonly known as: PACERONE   Doctor's comments: 400 mg po BID x 7 days then 400 mg po daily x 7 days then 200 mg po daily thereafter.  Take 1 Tablet by mouth 2 times a day.  Dose: 400 mg     aspirin 81 MG EC tablet  Start taking on: October 19, 2022   Take 1 Tablet by mouth every day.  Dose: 81 mg     clopidogrel 75 MG Tabs  Start taking on: October 19, 2022  Commonly known as: PLAVIX   Take 1 Tablet by mouth every day.  Dose: 75 mg     gabapentin 100 MG Caps  Commonly known as: NEURONTIN   Take 1 Capsule by mouth 3 times a day.  Dose: 100 mg     rosuvastatin 20 MG Tabs  Commonly known as: CRESTOR   Take 1 Tablet by mouth at bedtime.  Dose: 20 mg     traMADol 50 MG Tabs  Commonly known as: Ultram   Take 1 Tablet by mouth every 8 hours as needed (prn mild pain (NRS/WBF/FLACC Pain Scale 1-3, CPOT 1-2); use for mild pain if acetaminophen ineffective) for up to 14 days.  Dose: 50 mg            CONTINUE taking these medications        Instructions   Flonase 50 MCG/ACT nasal spray  Generic drug: fluticasone   Administer 1 Spray into each nostril 1 time a day as needed (Seasonal Allergies).  Dose: 1 Spray     levothyroxine 100 MCG Tabs  Commonly known as: SYNTHROID   Take 1 Tablet by mouth every morning on an empty stomach.  Dose: 100 mcg     losartan-hydrochlorothiazide 100-25 MG per tablet  Commonly known as: HYZAAR   Take 1 Tablet by mouth every day.  Dose: 1 Tablet     metoprolol  MG Tb24  Commonly known as: TOPROL XL   Take 1 Tablet by mouth every day.  Dose: 100 mg     MULTIVITAMIN & MINERAL PO   Take 1 Tablet by mouth every morning. \"MagBlue\"  Dose: 1 Tablet              NARCOTIC PAIN MEDICATIONS:   In prescribing controlled substances to this patient, I certify that I have obtained and reviewed their medical history. I have " also made a good jack effort to obtain applicable records from other providers who have treated the patient .    I have conducted a physical exam and documented it. I have reviewed the patient's prescription history as maintained by the [place] Prescription Monitoring Program.     I have assessed the patient’s risk for abuse, dependency, and addiction using the validated Opioid Risk Tool.    Given the above, I believe the benefits of controlled substance therapy outweigh the risks. The reasons for prescribing controlled substances include non-narcotic, oral analgesic alternatives have been inadequate for pain control. Accordingly, I have discussed the risk and benefits, treatment plan, and alternative therapies with the patient.     Pt understands this prescription is a controlled substance which is potentially habit-forming and its use is regulated by the [facility]. It must be submitted to the pharmacy within 5 days of the date written and can not be called in or faxed to the pharmacy. Refills are subject to terms of a medicine agreement. Any refill requires a new prescription that must be obtained from this office during regular office hours Monday through Thursday 7 am to 4 pm. We ask for 72 hours notice to get an appointment for a narcotic pain medication refill. This medicine can cause nausea, significant constipation, sedation, confusion.     DIET:   Cardiac diet    DISCHARGE INSTRUCTIONS DISCUSSED WITH THE PATIENT:      1. NO driving for 4 weeks after surgery. You may ride as a passenger.  2. NO lifting of any item over 10 lbs (e.g. gallon of milk) for 6 weeks after surgery.  3. DO walk as much as possible! Walk a minimum of once a day. Depending on your fatigue and comfort level, you may walk as much as you wish. There is no maximum.  4. Other physical activities (sex, housework, gardening, etc.) are OK after 4 weeks   5. Continue using incentive spirometer for 2 weeks, especially if going home on  oxygen.    Incision Care:  1. SHOWER ONLY - no baths. Clean incision daily with plain Ivory ® soap or any other dye or perfume free soap. Then pat incision dry with clean towel. Avoid creams or lotions on the incision(s).  a. If there is any increase in redness or swelling, or separation of the incision line, or thick drainage* from any of the incisions, call right away  * Clear, thin drainage is not abnormal especially from the leg incision and/or                         chest tube sites.  2. Continue to wear your CHRISTIANNE Stockings for 4 weeks. You may take off the stockings when in bed or when the legs are elevated.    Patient instructed to call [facility] cardiac surgery at [phone]  if any increased shortness of breath, uncontrolled pain, weight gain greater than 2 pounds in 1 day, SBP >140, HR <60 or redness swelling or drainage of incisions.      FOLLOW-UP:   Future Appointments   Date Time Provider Department Center   10/24/2022 10:20 AM Neena Dye P.A.-C. [facility] None   10/24/2022 11:00 AM CT [facility] None   11/3/2022  9:15 AM Saima Santizo P.A.-C. [facility] None   11/14/2022  1:15 PM CT RESOURCE PROVIDER [facility] None

## 2022-10-18 NOTE — PROGRESS NOTES
Patient discharged home with son. All personal belongings collected. IV access and Internal Jugular line removed. Monitor removed, monitor room notified. Prevena removed per protocol. Discharge instructions discussed. Medications reviewed; medications sent to [facility] per patient request. Follow up appointments scheduled. Patient escorted off unit via wheelchair without incident.

## 2022-10-21 ENCOUNTER — TELEPHONE (OUTPATIENT)
Dept: CARDIOTHORACIC SURGERY | Facility: MEDICAL CENTER | Age: 57
End: 2022-10-21
Payer: COMMERCIAL

## 2022-10-21 NOTE — TELEPHONE ENCOUNTER
"Hospital follow up call.    Her chief concern is sleeping and anxiety. She was told to try benadryl and or melatonin and she was educated on proper sternal precautions for mobilizing out of the bed.    she states that all incisions are healing well and approximated with no s/s of infection (redness, puss, fever, purulent drainage, or tenderness). She states one mediastinal site is \"slightly pink\". She is coming Monday for staple appointment and I will look at it then; no drainage noted.    she is using the IS; volume is improved.    she is walking everyday, distance is increased from the hospital.    she is obtaining daily weights. Current weight is 210 lbs which is less than the first home weight of 212 lbs. She has minimal LE edema; L > R due to EVH site.    she is taking all prescribed meds as directed.  she has no medication questions at this time.    she is taking vitals (HR and BP).    BP's: 166/100 (am prior to meds) 149/78  135/79 177/94 (am prior to meds)  HR's: 70's 80's with one 97 this morning    I called TERRENCE Albarran regarding HTN and will call her back with instructions if any.    she has had a bowel movement since discharge.    she is showering everyday or every other day and is wearing the compression/CHRISTIANNE hose.    she states that the post op pain is well controlled.  Narcotics are being utilized. Tylenol is being utilized.    she has no additional questions at this time. Follow up education was not needed on anything. Follow up appointments were reviewed and I ensured they have my number if issues or concerns arise.      Follow up call time was 10 minutes.        "

## 2022-10-21 NOTE — TELEPHONE ENCOUNTER
TERRENCE Albarran placed orders for amlodipine 10 mg daily. She was told to fill and start tonight if possible.  As a safety, she was given a hold parameter if her SBP was < 100 to not take it and to take her BP morning and night.    Call time 3 minutes

## 2022-10-24 ENCOUNTER — OFFICE VISIT (OUTPATIENT)
Dept: MEDICAL GROUP | Facility: LAB | Age: 57
End: 2022-10-24
Payer: COMMERCIAL

## 2022-10-24 ENCOUNTER — NON-PROVIDER VISIT (OUTPATIENT)
Dept: CARDIOTHORACIC SURGERY | Facility: MEDICAL CENTER | Age: 57
End: 2022-10-24
Payer: COMMERCIAL

## 2022-10-24 VITALS
BODY MASS INDEX: 31.34 KG/M2 | HEIGHT: 66 IN | DIASTOLIC BLOOD PRESSURE: 60 MMHG | SYSTOLIC BLOOD PRESSURE: 98 MMHG | WEIGHT: 195 LBS

## 2022-10-24 VITALS
TEMPERATURE: 97.7 F | OXYGEN SATURATION: 97 % | DIASTOLIC BLOOD PRESSURE: 60 MMHG | HEART RATE: 64 BPM | BODY MASS INDEX: 32.47 KG/M2 | HEIGHT: 66 IN | WEIGHT: 202 LBS | RESPIRATION RATE: 20 BRPM | SYSTOLIC BLOOD PRESSURE: 98 MMHG

## 2022-10-24 DIAGNOSIS — Z95.1 S/P CABG X 2: ICD-10-CM

## 2022-10-24 DIAGNOSIS — Z09 HOSPITAL DISCHARGE FOLLOW-UP: ICD-10-CM

## 2022-10-24 DIAGNOSIS — I47.10 SVT (SUPRAVENTRICULAR TACHYCARDIA) (HCC): ICD-10-CM

## 2022-10-24 DIAGNOSIS — I10 ESSENTIAL HYPERTENSION: ICD-10-CM

## 2022-10-24 PROCEDURE — 99214 OFFICE O/P EST MOD 30 MIN: CPT | Performed by: PHYSICIAN ASSISTANT

## 2022-10-24 ASSESSMENT — FIBROSIS 4 INDEX
FIB4 SCORE: 0.9
FIB4 SCORE: 0.9

## 2022-10-24 NOTE — NON-PROVIDER
Pt here today for a staple removal. 8 staples were located in the right leg and removed. Pt tolerated this well. Benzoin and steri-strips were applied to the affected area and precautions were given. Pt verbalized understanding. Pt stated that she has questions for Jossie, however, Jossie was not in office at the time. Jossie was informed that the pt had questions.   Felipa HURTADO

## 2022-10-25 ENCOUNTER — TELEPHONE (OUTPATIENT)
Dept: CARDIOTHORACIC SURGERY | Facility: MEDICAL CENTER | Age: 57
End: 2022-10-25
Payer: COMMERCIAL

## 2022-10-25 NOTE — TELEPHONE ENCOUNTER
Chief concern: low BP    SBP 88/41 (today afternoon)   90/47 (today morning)   152/55 (yesterday)    Saw PCP yesterday who noted the following:   Holding aldactone (hypotension)   Has not taken amlodipine as it has just been filled at [facility] so non contributory    Her biggest complaint is fatigue, does not c/o dizzy.    She states it coincides after taking gabapentin which was prescribed post op for nerve pain second to LIMA use.  I spoke with TERRENCE Reilly who wants her to take it at night. This was communicated to her.    She states her mediastinal CT site is improved from Monday when she was seen for staple removal.    Call time 13 minutes

## 2022-10-26 NOTE — PROGRESS NOTES
"CHIEF COMPLAINT: Post-op visit     PROCEDURE:   10/12/22 Dr. Smyth  Coronary artery bypass grafting x2  Left internal mammary artery to left anterior descending artery  Reversed saphenous vein graft to postero-lateral artery  Endo-vein procurement    HPI: Patient presents to the clinic for her one month follow up appointment. She reports feeling well. She is ambulating around her house and tries to walk outside when its not too cold outside. Patient describes nerve pain in her chest. Otherwise, her pain is managed with Tylenol. Patient completed course of antibiotics.     /82 (BP Location: Left arm, Patient Position: Sitting, BP Cuff Size: Adult)   Pulse 78   Temp 36.3 °C (97.3 °F) (Temporal)   Ht 1.676 m (5' 6\")   Wt 90.9 kg (200 lb 8 oz)   SpO2 97%     PHYSICAL EXAM:  Cardiac: S1S2  Neuro:  AAO x 3  Resp:  CTA  Wounds:  CDI  Sternum:  Stable    PLAN:   Continue plavix for 3 months or per your Cardiologist's recommendations.  We reviewed post operative sternal precautions, weight limits and driving precautions moving forward.  We reviewed SBE prophylaxis.  Discharge from clinic.     Overall, we are very pleased with the patient’s progress and we have instructed the patient to follow-up with us in the future should they have any concerns related to their surgery. Otherwise, we will see the patient on a PRN basis. The patient will continue to follow-up with their Cardiologist and PCP.  The patient has been informed that any further prescription refills should be done through their primary care physician and/or cardiologist.  They acknowledged understanding.  Thank you for allowing us to participate in the care of this very pleasant patient and please let us know if there is any way we may be of further assistance.    "

## 2022-10-27 ENCOUNTER — HOSPITAL ENCOUNTER (OUTPATIENT)
Dept: LAB | Facility: MEDICAL CENTER | Age: 57
End: 2022-10-27
Attending: PHYSICIAN ASSISTANT
Payer: COMMERCIAL

## 2022-10-27 DIAGNOSIS — I10 ESSENTIAL HYPERTENSION: ICD-10-CM

## 2022-10-27 DIAGNOSIS — I47.10 SVT (SUPRAVENTRICULAR TACHYCARDIA) (HCC): ICD-10-CM

## 2022-10-27 LAB
ANION GAP SERPL CALC-SCNC: 11 MMOL/L (ref 7–16)
BASOPHILS # BLD AUTO: 1.2 % (ref 0–1.8)
BASOPHILS # BLD: 0.11 K/UL (ref 0–0.12)
BUN SERPL-MCNC: 26 MG/DL (ref 8–22)
CALCIUM SERPL-MCNC: 10.5 MG/DL (ref 8.5–10.5)
CHLORIDE SERPL-SCNC: 102 MMOL/L (ref 96–112)
CO2 SERPL-SCNC: 22 MMOL/L (ref 20–33)
CREAT SERPL-MCNC: 1.17 MG/DL (ref 0.5–1.4)
EOSINOPHIL # BLD AUTO: 0.6 K/UL (ref 0–0.51)
EOSINOPHIL NFR BLD: 6.6 % (ref 0–6.9)
ERYTHROCYTE [DISTWIDTH] IN BLOOD BY AUTOMATED COUNT: 48.2 FL (ref 35.9–50)
GFR SERPLBLD CREATININE-BSD FMLA CKD-EPI: 55 ML/MIN/1.73 M 2
GLUCOSE SERPL-MCNC: 93 MG/DL (ref 65–99)
HCT VFR BLD AUTO: 42.6 % (ref 37–47)
HGB BLD-MCNC: 13.7 G/DL (ref 12–16)
IMM GRANULOCYTES # BLD AUTO: 0.04 K/UL (ref 0–0.11)
IMM GRANULOCYTES NFR BLD AUTO: 0.4 % (ref 0–0.9)
LYMPHOCYTES # BLD AUTO: 1.87 K/UL (ref 1–4.8)
LYMPHOCYTES NFR BLD: 20.5 % (ref 22–41)
MAGNESIUM SERPL-MCNC: 1.9 MG/DL (ref 1.5–2.5)
MCH RBC QN AUTO: 30 PG (ref 27–33)
MCHC RBC AUTO-ENTMCNC: 32.2 G/DL (ref 33.6–35)
MCV RBC AUTO: 93.4 FL (ref 81.4–97.8)
MONOCYTES # BLD AUTO: 0.61 K/UL (ref 0–0.85)
MONOCYTES NFR BLD AUTO: 6.7 % (ref 0–13.4)
NEUTROPHILS # BLD AUTO: 5.89 K/UL (ref 2–7.15)
NEUTROPHILS NFR BLD: 64.6 % (ref 44–72)
NRBC # BLD AUTO: 0 K/UL
NRBC BLD-RTO: 0 /100 WBC
PLATELET # BLD AUTO: 739 K/UL (ref 164–446)
PMV BLD AUTO: 9.2 FL (ref 9–12.9)
POTASSIUM SERPL-SCNC: 4.7 MMOL/L (ref 3.6–5.5)
RBC # BLD AUTO: 4.56 M/UL (ref 4.2–5.4)
SODIUM SERPL-SCNC: 135 MMOL/L (ref 135–145)
WBC # BLD AUTO: 9.1 K/UL (ref 4.8–10.8)

## 2022-10-27 PROCEDURE — 36415 COLL VENOUS BLD VENIPUNCTURE: CPT

## 2022-10-27 PROCEDURE — 85025 COMPLETE CBC W/AUTO DIFF WBC: CPT

## 2022-10-27 PROCEDURE — 83735 ASSAY OF MAGNESIUM: CPT

## 2022-10-27 PROCEDURE — 80048 BASIC METABOLIC PNL TOTAL CA: CPT

## 2022-10-31 ENCOUNTER — TELEPHONE (OUTPATIENT)
Dept: CARDIOTHORACIC SURGERY | Facility: MEDICAL CENTER | Age: 57
End: 2022-10-31
Payer: COMMERCIAL

## 2022-10-31 DIAGNOSIS — R89.9 ABNORMAL LABORATORY TEST RESULT: ICD-10-CM

## 2022-10-31 RX ORDER — DOXYCYCLINE 100 MG/1
100 CAPSULE ORAL 2 TIMES DAILY
Qty: 20 CAPSULE | Refills: 0 | Status: SHIPPED
Start: 2022-10-31 | End: 2023-02-16

## 2022-10-31 NOTE — TELEPHONE ENCOUNTER
All 3 chest tube sites are oozing yellow at this time.  No redness but slight odor.  Slight rash under one breast. Pictures send and evaluated by TERRENCE Mccall who will send antibiotics to [facility]. She is to call in 3 days if no improvement.

## 2022-11-02 ASSESSMENT — ENCOUNTER SYMPTOMS
HEARTBURN: 0
PALPITATIONS: 0
COUGH: 0
ABDOMINAL PAIN: 0
PND: 0
FEVER: 0
SHORTNESS OF BREATH: 0
ORTHOPNEA: 0
CLAUDICATION: 0
DIZZINESS: 0
CHILLS: 0
NAUSEA: 0

## 2022-11-02 NOTE — PROGRESS NOTES
Subjective:     Clary Bah is a 57 y.o. female who presents for Hospital Follow-up.  Chart reviewed. Discharge summary available for review: Yes  Date of discharge 10/18/2022.  48- hour post discharge RN call completed on 10/21/2022 and documented in the medical record by Jossie Wilson RN.    HPI: Recently hospitalized for Multivessel CAD, NSTEMI, hypertension, obesity, hypothyroid.  She underwent x2 CABG.    Since returning home, patient reports some mild fatigue and blood pressures in hypotensive range today in clinic.    Patient was prescribed amlodipine 10 mg daily upon discharge but did not fill the prescription and has not been taking it.  She was also prescribed Aldactone but there appears to be conflicting documentation regarding this medication.  Patient has been instructed to hold this medication due to the hypotension and contact cardiology for further instruction    Patient does have 1 surgical incision that appears slightly irritated but no purulent drainage is noted.  Patient has follow-up appointment with cardiothoracic surgery later today for eval.    The patient denies weakness; denies difficulty taking care of self at home.    Denies any subsequent chest pain, shortness of breath, palpitations, claudications, syncope, near syncope.  Patient's weight since discharge has been stable    Allergies:   Ace inhibitors and Atorvastatin    Social History:  Social History     Tobacco Use    Smoking status: Never    Smokeless tobacco: Never   Substance Use Topics    Alcohol use: Yes     Comment: occ        ROS:  Review of Systems   Constitutional:  Negative for chills and fever.   Respiratory:  Negative for cough and shortness of breath.    Cardiovascular:  Negative for chest pain, palpitations, orthopnea, claudication, leg swelling and PND.   Gastrointestinal:  Negative for abdominal pain, heartburn and nausea.   Genitourinary:  Negative for dysuria.   Neurological:  Negative for dizziness.         "Objective:     BP (!) 98/60   Pulse 64   Temp 36.5 °C (97.7 °F)   Resp 20   Ht 1.676 m (5' 6\")   Wt 91.6 kg (202 lb)   SpO2 97%      Physical Exam:  General: NAD   HEENT: PERRL; EOMI, nl conjunctiva & lids, hearing grossly nl, EACs and TMs nl, good dentition, OP clear   Neck: supple, no thyromegaly or nodules, no LAD   Lungs: CTA bilaterally, Nl I:E ratio   CV: RRR. No M/R/G, no edema   Abd: NABS, S/NT/ND, no masses or HSM   MSK: grossly normal   Neuro: Normal cognition, CNs grossly intact, nl strength, nl gait   Psych: normal judgment, insight, mood, affect   Skin: no rashes, slightly pink surgical incision in left anterior thoracic region with no erythema induration or purulent drainage noted        Assessment and Plan:   1. Essential hypertension  Chronic condition  Hypotensive today in clinic.  I have advised patient to hold her Aldactone and amlodipine and continue to monitor blood pressure.  She will contact the clinic if her blood pressure continues to be low.  Also instructed patient to contact cardiology for further instruction  - CBC WITH DIFFERENTIAL; Future  - Basic Metabolic Panel; Future  - MAGNESIUM; Future    2. SVT (supraventricular tachycardia) (HCC)  New problem, resolved  Continue follow-up with cardiology as scheduled  - CBC WITH DIFFERENTIAL; Future  - Basic Metabolic Panel; Future  - MAGNESIUM; Future    3. S/P CABG x 2  New problem  Continue follow-up with cardiology as scheduled  Continue aspirin 81 mg, Plavix 75 mg  Continue metoprolol 100 mg daily      Hospitalization and results reviewed with patient. High risk conditions requiring teaching or care coordination were identified and addressed.The patient demonstrate understanding of admission and underlying conditions. The patient understands discharge instructions and when to seek medical attention. Medications reviewed including instructions regarding high risk medications, dosing and side effects.    The patient is able to safely " "adhere to ADL/IADL, treatment and medication regimen, self-manage of high-risk conditions? Yes  The patient requires physical therapy/home health/DME referral? No   The patient requires referral to care coordination/behavioral health/social work?  No   Patient requires referral for pharmacy consult? No   Advance directive/POLST on file?  No   Required counseled on advance directive?  No         Medication Reconciliation  Medication list at end of encounter:   Current Outpatient Medications   Medication Sig Dispense Refill    doxycycline (MONODOX) 100 MG capsule Take 1 Capsule by mouth 2 times a day. 20 Capsule 0    amLODIPine (NORVASC) 10 MG Tab Take 1 Tablet by mouth every day for 90 days. Indications: High Blood Pressure Disorder 30 Tablet 2    acetaminophen (TYLENOL) 500 MG Tab Take 2 Tablets by mouth every 6 hours as needed for Moderate Pain. 30 Tablet 0    amiodarone (PACERONE) 400 MG tablet Take 1 Tablet by mouth 2 times a day. 56 Tablet 1    aspirin EC 81 MG EC tablet Take 1 Tablet by mouth every day. 30 Tablet     clopidogrel (PLAVIX) 75 MG Tab Take 1 Tablet by mouth every day. 30 Tablet 2    gabapentin (NEURONTIN) 100 MG Cap Take 1 Capsule by mouth 3 times a day. 90 Capsule 1    rosuvastatin (CRESTOR) 20 MG Tab Take 1 Tablet by mouth at bedtime. 30 Tablet 2    spironolactone (ALDACTONE) 25 MG Tab Take 1 Tablet by mouth every day. 30 Tablet 3    fluticasone (FLONASE) 50 MCG/ACT nasal spray Administer 1 Spray into each nostril 1 time a day as needed (Seasonal Allergies).      Multiple Vitamins-Minerals (MULTIVITAMIN & MINERAL PO) Take 1 Tablet by mouth every morning. \"MagBlue\"      metoprolol SR (TOPROL XL) 100 MG TABLET SR 24 HR Take 1 Tablet by mouth every day. 90 Tablet 3    losartan-hydrochlorothiazide (HYZAAR) 100-25 MG per tablet Take 1 Tablet by mouth every day. 90 Tablet 3    levothyroxine (SYNTHROID) 100 MCG Tab Take 1 Tablet by mouth every morning on an empty stomach. 90 Tablet 3     No current " facility-administered medications for this visit.       Primary care follow-up:  New health conditions identified during hospitalization? Yes  Labs/pathology/imaging requires future PCP follow-up?  Yes  Changes to medications during hospitalization or today? Yes    Recommended followup: Return in about 4 weeks (around 11/21/2022). with Neena Dye P.A.-C.   Future Appointments         Provider Department [place]    11/3/2022 9:15 AM Saima Santizo P.A.-C. [facility]     11/14/2022 1:15 PM CT RESOURCE PROVIDER [facility] - Cardiothoracic Surgery     11/16/2022 10:40 AM (Arrive by 10:25 AM) Neena Dye P.A.-C. [facility] - [facility]             Patient Instruction  Patient offered educational material on discharge diagnosis and management of symptoms/red flags. Patient instructed to keep follow-up appointments and to bring written questions and and actual medications to each office visit. Patient instructed to call PCP/specialist with any problems/questions/concerns. Patient verbalizes understanding and has no further questions at this time.    Face-to-face transitional care management services with high complexity medical decision making.

## 2022-11-02 NOTE — PROGRESS NOTES
Chief Complaint   Patient presents with    Supraventricular Tachycardia (SVT)     Hospital follow up double bypass     HTN (Uncontrolled)      Subjective:   Clary Bah is a 57 y.o. female who presents today for follow-up with her son.    Patient of Dr. La.  Current medical problems include hypertension, hyperlipidemia and CCS of 0 in 2020 presented with chest pressure, found to have NSTEMI and referred to cath lab which showed extensive disease in the LAD and ostial to mid RCA into posterior descending and posterior lateral branches.  She was referred to cardiothoracic surgery for consideration of bypass due to her elevated glucose readings and young age.    Underwent two-vessel coronary artery bypass (LIMA-LAD, reverse SVG to posterolateral artery) by Dr. Smyth 10/18/2022.  Postoperatively she had atrial fibrillation RVR  for about 30 min which was treated with IV amiodarone otherwise she had an uncomplicated stay was discharged on oral amiodarone taper, aspirin, Plavix, beta-blocker, ARB, statin.    Clary is doing well after her surgery. She has sharp burning pain that radiates superficially on her chest.  Currently using Tylenol to treat this as the gabapentin makes her too sleepy.  She also started a course of antibiotics this week due to a chest tube incision that is healing slowly and has some purulence.  She also feels some gas and bloating when she eats meals, currently eating slower and smaller meals which has been helping.  She has no hematochezia or melena.    Clary denies orthopnea, lower extremity swelling, dyspnea with exertion.  She has no recurrence of the atrial fibrillation that occurred in the hospital, she was very symptomatic with that at the time.     She is been monitoring her blood pressures at home, she had a few readings with a systolic in the 90s with corresponding fatigue and dizziness.  For the most part her blood pressures are systolic 110-120.    Her father had coronary artery  disease with several heart attacks starting in his 60s or 70s.  He ultimately  from complications of a stroke.  No other family history of coronary artery disease including 5 of her 6 siblings. Her brother  in his sleep at age 63 from possible heart attack.   Past Medical History:   Diagnosis Date    Anesthesia     slow to wake up    Heart palpitations     Hypertension     Hypothyroid     Vitamin D deficiency          Past Surgical History:   Procedure Laterality Date    MULTIPLE CORONARY ARTERY BYPASS ENDO VEIN HARVEST  10/12/2022    Procedure: CORONARY ARTERY BYPASS GRAFTING X2, ENDOSCOPIC VEIN HARVEST;  Surgeon: Coretta Smyth M.D.;  Location: SURGERY [facility];  Service: Cardiac    BEVERLY  10/12/2022    Procedure: ECHOCARDIOGRAM, TRANSESOPHAGEAL;  Surgeon: Coretta Smyth M.D.;  Location: SURGERY [facility];  Service: Cardiac    RECTOCELE REPAIR  3/15/2011    Performed by MARK RAMÍREZ at SURGERY SAME DAY [facility]    BIOPSY GENERAL  10/20/08    Performed by MARK RAMÍREZ at SURGERY SAME DAY [facility]    HYSTEROSCOPY WITH VIDEO OPERATIVE  10/20/08    Performed by MARK RAMÍREZ at SURGERY SAME DAY [facility]    GYN SURGERY          BRIANNE BY LAPAROSCOPY           Family History   Problem Relation Age of Onset    Arthritis Mother     Hypertension Mother     Heart Disease Father 67        heart attack    Diabetes Father     Hypertension Sister          Social History     Tobacco Use   Smoking Status Never   Smokeless Tobacco Never          Social History     Substance and Sexual Activity   Alcohol Use Yes    Comment: occ         Allergies   Allergen Reactions    Ace Inhibitors Cough    Atorvastatin Unspecified     Statin not indicted with coronary calcium score of 0.         Outpatient Encounter Medications as of 11/3/2022   Medication Sig Dispense Refill    amiodarone (CORDARONE) 200 MG Tab Take 200 mg by mouth every day.      doxycycline (MONODOX) 100 MG capsule Take  "1 Capsule by mouth 2 times a day. 20 Capsule 0    acetaminophen (TYLENOL) 500 MG Tab Take 2 Tablets by mouth every 6 hours as needed for Moderate Pain. 30 Tablet 0    aspirin EC 81 MG EC tablet Take 1 Tablet by mouth every day. 30 Tablet     clopidogrel (PLAVIX) 75 MG Tab Take 1 Tablet by mouth every day. 30 Tablet 2    gabapentin (NEURONTIN) 100 MG Cap Take 1 Capsule by mouth 3 times a day. (Patient taking differently: Take 100 mg by mouth at bedtime.) 90 Capsule 1    rosuvastatin (CRESTOR) 20 MG Tab Take 1 Tablet by mouth at bedtime. 30 Tablet 2    spironolactone (ALDACTONE) 25 MG Tab Take 1 Tablet by mouth every day. 30 Tablet 3    fluticasone (FLONASE) 50 MCG/ACT nasal spray Administer 1 Spray into each nostril 1 time a day as needed (Seasonal Allergies).      metoprolol SR (TOPROL XL) 100 MG TABLET SR 24 HR Take 1 Tablet by mouth every day. 90 Tablet 3    losartan-hydrochlorothiazide (HYZAAR) 100-25 MG per tablet Take 1 Tablet by mouth every day. 90 Tablet 3    levothyroxine (SYNTHROID) 100 MCG Tab Take 1 Tablet by mouth every morning on an empty stomach. 90 Tablet 3    [DISCONTINUED] amLODIPine (NORVASC) 10 MG Tab Take 1 Tablet by mouth every day for 90 days. Indications: High Blood Pressure Disorder (Patient not taking: Reported on 11/3/2022) 30 Tablet 2    [DISCONTINUED] amiodarone (PACERONE) 400 MG tablet Take 1 Tablet by mouth 2 times a day. (Patient not taking: Reported on 11/3/2022) 56 Tablet 1    [DISCONTINUED] Multiple Vitamins-Minerals (MULTIVITAMIN & MINERAL PO) Take 1 Tablet by mouth every morning. \"MagBlue\" (Patient not taking: Reported on 11/3/2022)       No facility-administered encounter medications on file as of 11/3/2022.         Review of Systems   Constitutional:  Negative for chills and fever.        No unexpected weight changes   HENT:  Negative for nosebleeds.    Respiratory:  Negative for cough and shortness of breath.    Cardiovascular:  Negative for chest pain, palpitations, orthopnea, " "leg swelling and PND.   Gastrointestinal:  Negative for blood in stool, melena and nausea.   Genitourinary:  Negative for hematuria.   Neurological:  Positive for tingling and sensory change. Negative for dizziness and loss of consciousness.        Objective:   /64 (BP Location: Left arm, Patient Position: Sitting)   Pulse 70   Resp 16   Ht 1.676 m (5' 6\")   Wt 89.8 kg (198 lb)   LMP 06/01/2020 (Approximate)   SpO2 98%   BMI 31.96 kg/m²        Physical Exam  Vitals and nursing note reviewed.   Constitutional:       General: She is not in acute distress.     Appearance: Normal appearance.   HENT:      Head: Normocephalic and atraumatic.   Eyes:      General: No scleral icterus.  Cardiovascular:      Rate and Rhythm: Normal rate and regular rhythm.      Pulses: Normal pulses.      Heart sounds: No murmur heard.  Pulmonary:      Effort: Pulmonary effort is normal. No respiratory distress.      Breath sounds: Normal breath sounds. No rales.   Abdominal:      General: There is no distension.      Palpations: Abdomen is soft.   Musculoskeletal:      Right lower leg: No edema.      Left lower leg: No edema.   Skin:     General: Skin is warm and dry.      Capillary Refill: Capillary refill takes less than 2 seconds.      Comments: Healing sternotomy wound.  3 chest tube incisions in the upper abdomen, left wound not well approximated with some purulence.  No surrounding erythema.  Right lower extremity incisions are dry, Steri-Strips in place.   Neurological:      General: No focal deficit present.      Mental Status: She is alert and oriented to person, place, and time.   Psychiatric:         Judgment: Judgment normal.       Op note 10/12/2022   PROCEDURE:Coronary artery bypass grafting x2  Left internal mammary artery to left anterior descending artery  Reversed saphenous vein graft to postero-lateral artery  Endo-vein procurement    [facility] 10/9/22  CORONARY ANGIOGRAPHY:  The left main coronary artery is a short " tapering vessel with distal 20% disease that bifurcates into the left anterior descending and left circumflex coronary arteries.  The left anterior descending coronary artery is a heavily diseased vessel with ostial to proximal 50% disease, a long segment of mid 80% disease after the takeoff of the first diagonal branch and involving the ostium of the second diagonal branch, a relatively healthy mid-distal segment, and then tapering distal luminal irregularities.  It supplies two small diffusely diseased proximal diagonal branches and a small-moderate third diagonal branch.  The left circumflex coronary artery is a large, nondominant vessel that supplies a large first obtuse marginal branch and a small second obtuse marginal branch and has luminal irregularities in the distribution.  The right coronary artery is a large, dominant vessel with ostial 50% disease, mid 30% disease, and a hazy distal bifurcation with ostial 60-70% disease in both a large posterior descending coronary and a large posterolateral branch.     IMPRESSION:  Extensively diseased left anterior descending coronary from the ostium to mid segment of the vessel with a relatively healthy mid-distal segment.  Angiographically moderate ostial and moderate-severe distal bifurcation right coronary artery disease.  Minimal luminal irregularities in the left circumflex coronary artery distribution.     RECOMMENDATIONS:  Return to floor with continued care per primary service.  TR band release per protocol.  Further evaluation of the patient's diabetes status as her glycosylated hemoglobin A1c was in the prediabetic range, however, consecutive fasting glucose measurements were in the diabetic range, although the patient is currently in a high stress situation.  Strong consideration for coronary artery bypass grafting if diabetes is confirmed given extensive left anterior descending coronary disease and distal bifurcation right coronary artery  disease.  Continue optimal medical management of hypertension and coronary artery disease.  Assessment:     1. S/P CABG x 2  EKG      2. NSTEMI (non-ST elevated myocardial infarction) (HCC)  EKG    Lipid Profile    Basic Metabolic Panel      3. Essential hypertension  EKG    Lipid Profile    Basic Metabolic Panel      4. Mixed hyperlipidemia  EKG    Lipid Profile    Basic Metabolic Panel      5. Ischemic dilated cardiomyopathy (HCC)             Medical Decision Making:  Today's Assessment / Plan:     NSTEMI on admission  Multivessel CAD  Hyperlipidemia  - S.P CABG x2 (LIMA-LAD, rSVG to PL branch)  -Continue aspirin 81 mg and P2 Y 12 inhibitor for 6-12mo given NSTEMI presentation   -Continue beta-blocker therapy  -Continue high intensity statin. 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 LVH    Post OP AF  - <24hrs, now in sinus. Finish Amiodarone course then can discontinue.  - call office or go to ER for AF with RVR    Prediabetes    BMP and lipid profile at end of Nov. If BUN/SCr high then stop the HCTZ  RTC in 3mo

## 2022-11-03 ENCOUNTER — OFFICE VISIT (OUTPATIENT)
Dept: CARDIOLOGY | Facility: MEDICAL CENTER | Age: 57
End: 2022-11-03
Payer: COMMERCIAL

## 2022-11-03 ENCOUNTER — TELEPHONE (OUTPATIENT)
Dept: CARDIOLOGY | Facility: MEDICAL CENTER | Age: 57
End: 2022-11-03

## 2022-11-03 VITALS
WEIGHT: 198 LBS | HEIGHT: 66 IN | BODY MASS INDEX: 31.82 KG/M2 | RESPIRATION RATE: 16 BRPM | DIASTOLIC BLOOD PRESSURE: 64 MMHG | OXYGEN SATURATION: 98 % | SYSTOLIC BLOOD PRESSURE: 122 MMHG | HEART RATE: 70 BPM

## 2022-11-03 DIAGNOSIS — I25.5 ISCHEMIC DILATED CARDIOMYOPATHY (HCC): ICD-10-CM

## 2022-11-03 DIAGNOSIS — E78.2 MIXED HYPERLIPIDEMIA: ICD-10-CM

## 2022-11-03 DIAGNOSIS — I42.0 ISCHEMIC DILATED CARDIOMYOPATHY (HCC): ICD-10-CM

## 2022-11-03 DIAGNOSIS — I21.4 NSTEMI (NON-ST ELEVATED MYOCARDIAL INFARCTION) (HCC): ICD-10-CM

## 2022-11-03 DIAGNOSIS — Z95.1 S/P CABG X 2: ICD-10-CM

## 2022-11-03 DIAGNOSIS — I10 ESSENTIAL HYPERTENSION: ICD-10-CM

## 2022-11-03 LAB — EKG IMPRESSION: NORMAL

## 2022-11-03 PROCEDURE — 99214 OFFICE O/P EST MOD 30 MIN: CPT | Performed by: PHYSICIAN ASSISTANT

## 2022-11-03 PROCEDURE — 93000 ELECTROCARDIOGRAM COMPLETE: CPT | Performed by: INTERNAL MEDICINE

## 2022-11-03 RX ORDER — AMIODARONE HYDROCHLORIDE 200 MG/1
200 TABLET ORAL DAILY
COMMUNITY
End: 2023-02-16

## 2022-11-03 ASSESSMENT — ENCOUNTER SYMPTOMS
ORTHOPNEA: 0
LOSS OF CONSCIOUSNESS: 0
COUGH: 0
SHORTNESS OF BREATH: 0
TINGLING: 1
DIZZINESS: 0
PND: 0
CHILLS: 0
SENSORY CHANGE: 1
FEVER: 0
NAUSEA: 0
BLOOD IN STOOL: 0
PALPITATIONS: 0

## 2022-11-03 ASSESSMENT — FIBROSIS 4 INDEX: FIB4 SCORE: 0.43

## 2022-11-03 NOTE — PATIENT INSTRUCTIONS
You can stop the Amiodarone once you finish your bottle.   Fasting labs due week of Thanksgiving.

## 2022-11-14 ENCOUNTER — OFFICE VISIT (OUTPATIENT)
Dept: CARDIOTHORACIC SURGERY | Facility: MEDICAL CENTER | Age: 57
End: 2022-11-14
Payer: COMMERCIAL

## 2022-11-14 VITALS
BODY MASS INDEX: 32.22 KG/M2 | DIASTOLIC BLOOD PRESSURE: 82 MMHG | SYSTOLIC BLOOD PRESSURE: 126 MMHG | HEIGHT: 66 IN | OXYGEN SATURATION: 97 % | WEIGHT: 200.5 LBS | HEART RATE: 78 BPM | TEMPERATURE: 97.3 F

## 2022-11-14 DIAGNOSIS — I25.118 CORONARY ARTERY DISEASE OF NATIVE HEART WITH STABLE ANGINA PECTORIS, UNSPECIFIED VESSEL OR LESION TYPE (HCC): ICD-10-CM

## 2022-11-14 PROCEDURE — 99024 POSTOP FOLLOW-UP VISIT: CPT | Performed by: NURSE PRACTITIONER

## 2022-11-14 ASSESSMENT — FIBROSIS 4 INDEX: FIB4 SCORE: 0.43

## 2022-11-16 ENCOUNTER — OFFICE VISIT (OUTPATIENT)
Dept: MEDICAL GROUP | Facility: LAB | Age: 57
End: 2022-11-16
Payer: COMMERCIAL

## 2022-11-16 VITALS
WEIGHT: 197 LBS | RESPIRATION RATE: 16 BRPM | HEART RATE: 70 BPM | DIASTOLIC BLOOD PRESSURE: 74 MMHG | OXYGEN SATURATION: 97 % | BODY MASS INDEX: 31.66 KG/M2 | HEIGHT: 66 IN | TEMPERATURE: 97 F | SYSTOLIC BLOOD PRESSURE: 108 MMHG

## 2022-11-16 DIAGNOSIS — I21.4 NSTEMI (NON-ST ELEVATED MYOCARDIAL INFARCTION) (HCC): ICD-10-CM

## 2022-11-16 DIAGNOSIS — I10 ESSENTIAL HYPERTENSION: ICD-10-CM

## 2022-11-16 DIAGNOSIS — Z76.89 SLEEP CONCERN: ICD-10-CM

## 2022-11-16 DIAGNOSIS — Z95.1 S/P CABG X 2: ICD-10-CM

## 2022-11-16 PROCEDURE — 99214 OFFICE O/P EST MOD 30 MIN: CPT | Performed by: PHYSICIAN ASSISTANT

## 2022-11-16 ASSESSMENT — FIBROSIS 4 INDEX: FIB4 SCORE: 0.43

## 2022-11-16 NOTE — PROGRESS NOTES
Subjective:     CC: 1 mo f/u    HPI:   Clary here today with    Released by cardiothoracic surgery, healing appropriately     Blood pressure is normotensive today.  Currently taking:  Hyzaar 100-25 mg 1 tablet daily  Metoprolol  mg daily  Spironolactone 25 mg daily    Denies fatigue, palpitations, chest pain, shortness of breath, syncope, near syncope, claudication    Some issues with sleep  -primarily issues with falling asleep  -trying to adhere to regular sleep schedule  Some disjointed sleep with watching TV  Some naps during the day        ROS:  Gen: no fevers/chills, no changes in weight  Eyes: no changes in vision  ENT: no sore throat, no hearing loss, no bloody nose  Pulm: no sob, no cough  CV: no chest pain, no palpitations  GI: no nausea/vomiting, no diarrhea    Current Outpatient Medications Ordered in Epic   Medication Sig Dispense Refill    amiodarone (CORDARONE) 200 MG Tab Take 1 Tablet by mouth every day.      doxycycline (MONODOX) 100 MG capsule Take 1 Capsule by mouth 2 times a day. 20 Capsule 0    acetaminophen (TYLENOL) 500 MG Tab Take 2 Tablets by mouth every 6 hours as needed for Moderate Pain. 30 Tablet 0    aspirin EC 81 MG EC tablet Take 1 Tablet by mouth every day. 30 Tablet     clopidogrel (PLAVIX) 75 MG Tab Take 1 Tablet by mouth every day. 30 Tablet 2    rosuvastatin (CRESTOR) 20 MG Tab Take 1 Tablet by mouth at bedtime. 30 Tablet 2    spironolactone (ALDACTONE) 25 MG Tab Take 1 Tablet by mouth every day. 30 Tablet 3    fluticasone (FLONASE) 50 MCG/ACT nasal spray Administer 1 Spray into each nostril 1 time a day as needed (Seasonal Allergies).      metoprolol SR (TOPROL XL) 100 MG TABLET SR 24 HR Take 1 Tablet by mouth every day. 90 Tablet 3    losartan-hydrochlorothiazide (HYZAAR) 100-25 MG per tablet Take 1 Tablet by mouth every day. 90 Tablet 3    levothyroxine (SYNTHROID) 100 MCG Tab Take 1 Tablet by mouth every morning on an empty stomach. 90 Tablet 3    gabapentin  "(NEURONTIN) 100 MG Cap Take 1 Capsule by mouth 3 times a day. (Patient not taking: Reported on 11/16/2022) 90 Capsule 1     No current Epic-ordered facility-administered medications on file.         Objective:     Exam:  /74   Pulse 70   Temp 36.1 °C (97 °F)   Resp 16   Ht 1.676 m (5' 6\")   Wt 89.4 kg (197 lb)   LMP 06/01/2020 (Approximate)   SpO2 97%   BMI 31.80 kg/m²  Body mass index is 31.8 kg/m².    Gen: Alert and oriented, No apparent distress.  Neck: Neck is supple without lymphadenopathy.  Chest wall: Chest tube incisions in the upper abdomen appear to be healing appropriately with some scabbing noted on the left most one.  No erythema, induration or purulent drainage noted  Lungs: Normal effort, CTA bilaterally, no wheezes, rhonchi, or rales  CV: Regular rate and rhythm. No murmurs, rubs, or gallops.  Ext: No clubbing, cyanosis, edema.      Assessment & Plan:     57 y.o. female with the following -     1. Essential hypertension  This is a chronic condition, controlled.  Blood pressure is at goal in the office today.  Continue the current regimen.  Hyzaar 100-25 mg 1 tablet daily  Metoprolol  mg daily  Spironolactone 25 mg daily    2. Sleep concern  Discussed sleep hygiene with patient including avoiding screen time before bed and keeping a regular bedtime and wake up time.  Also discussed trial of 1 to 3 mg melatonin 30 to 40 minutes before bed    3. NSTEMI (non-ST elevated myocardial infarction) (HCC)  Status post CABG X2  Continue with cardiology recommendations and keep follow-up as scheduled  Continue aspirin 81 mg, Plavix 75 mg  Continue metoprolol  mg daily  Continue Crestor 20 mg nightly      4.  S/P CABG x 2  Released by cardiothoracic surgery      I spent a total of 15 minutes with record review, exam, communication with the patient, communication with other providers, and documentation of this encounter.      Return in about 3 months (around 2/16/2023).    Please note that " this dictation was created using voice recognition software. I have made every reasonable attempt to correct obvious errors, but there may be errors of grammar and possibly content that I did not discover before finalizing the note.

## 2022-11-30 ENCOUNTER — HOSPITAL ENCOUNTER (OUTPATIENT)
Dept: LAB | Facility: MEDICAL CENTER | Age: 57
End: 2022-11-30
Attending: PHYSICIAN ASSISTANT
Payer: COMMERCIAL

## 2022-11-30 ENCOUNTER — OFFICE VISIT (OUTPATIENT)
Dept: MEDICAL GROUP | Facility: LAB | Age: 57
End: 2022-11-30
Payer: COMMERCIAL

## 2022-11-30 VITALS
TEMPERATURE: 97.9 F | SYSTOLIC BLOOD PRESSURE: 112 MMHG | BODY MASS INDEX: 31.5 KG/M2 | DIASTOLIC BLOOD PRESSURE: 60 MMHG | HEIGHT: 66 IN | WEIGHT: 196 LBS | RESPIRATION RATE: 16 BRPM | HEART RATE: 72 BPM | OXYGEN SATURATION: 97 %

## 2022-11-30 DIAGNOSIS — R89.9 ABNORMAL LABORATORY TEST RESULT: ICD-10-CM

## 2022-11-30 DIAGNOSIS — E78.2 MIXED HYPERLIPIDEMIA: ICD-10-CM

## 2022-11-30 DIAGNOSIS — I21.4 NSTEMI (NON-ST ELEVATED MYOCARDIAL INFARCTION) (HCC): ICD-10-CM

## 2022-11-30 DIAGNOSIS — I10 ESSENTIAL HYPERTENSION: ICD-10-CM

## 2022-11-30 DIAGNOSIS — J01.00 ACUTE NON-RECURRENT MAXILLARY SINUSITIS: ICD-10-CM

## 2022-11-30 LAB
ANION GAP SERPL CALC-SCNC: 8 MMOL/L (ref 7–16)
ANION GAP SERPL CALC-SCNC: 9 MMOL/L (ref 7–16)
BASOPHILS # BLD AUTO: 1.1 % (ref 0–1.8)
BASOPHILS # BLD: 0.06 K/UL (ref 0–0.12)
BUN SERPL-MCNC: 18 MG/DL (ref 8–22)
BUN SERPL-MCNC: 18 MG/DL (ref 8–22)
CALCIUM SERPL-MCNC: 10.2 MG/DL (ref 8.5–10.5)
CALCIUM SERPL-MCNC: 10.2 MG/DL (ref 8.5–10.5)
CHLORIDE SERPL-SCNC: 106 MMOL/L (ref 96–112)
CHLORIDE SERPL-SCNC: 106 MMOL/L (ref 96–112)
CHOLEST SERPL-MCNC: 110 MG/DL (ref 100–199)
CO2 SERPL-SCNC: 27 MMOL/L (ref 20–33)
CO2 SERPL-SCNC: 28 MMOL/L (ref 20–33)
CREAT SERPL-MCNC: 1.08 MG/DL (ref 0.5–1.4)
CREAT SERPL-MCNC: 1.09 MG/DL (ref 0.5–1.4)
EOSINOPHIL # BLD AUTO: 0.32 K/UL (ref 0–0.51)
EOSINOPHIL NFR BLD: 6 % (ref 0–6.9)
ERYTHROCYTE [DISTWIDTH] IN BLOOD BY AUTOMATED COUNT: 43.8 FL (ref 35.9–50)
FASTING STATUS PATIENT QL REPORTED: NORMAL
GFR SERPLBLD CREATININE-BSD FMLA CKD-EPI: 60 ML/MIN/1.73 M 2
GFR SERPLBLD CREATININE-BSD FMLA CKD-EPI: 60 ML/MIN/1.73 M 2
GLUCOSE SERPL-MCNC: 90 MG/DL (ref 65–99)
GLUCOSE SERPL-MCNC: 92 MG/DL (ref 65–99)
HCT VFR BLD AUTO: 40.7 % (ref 37–47)
HDLC SERPL-MCNC: 35 MG/DL
HGB BLD-MCNC: 13.7 G/DL (ref 12–16)
IMM GRANULOCYTES # BLD AUTO: 0.01 K/UL (ref 0–0.11)
IMM GRANULOCYTES NFR BLD AUTO: 0.2 % (ref 0–0.9)
LDLC SERPL CALC-MCNC: 58 MG/DL
LYMPHOCYTES # BLD AUTO: 1.29 K/UL (ref 1–4.8)
LYMPHOCYTES NFR BLD: 24.3 % (ref 22–41)
MCH RBC QN AUTO: 30.2 PG (ref 27–33)
MCHC RBC AUTO-ENTMCNC: 33.7 G/DL (ref 33.6–35)
MCV RBC AUTO: 89.6 FL (ref 81.4–97.8)
MONOCYTES # BLD AUTO: 0.43 K/UL (ref 0–0.85)
MONOCYTES NFR BLD AUTO: 8.1 % (ref 0–13.4)
NEUTROPHILS # BLD AUTO: 3.2 K/UL (ref 2–7.15)
NEUTROPHILS NFR BLD: 60.3 % (ref 44–72)
NRBC # BLD AUTO: 0 K/UL
NRBC BLD-RTO: 0 /100 WBC
PLATELET # BLD AUTO: 344 K/UL (ref 164–446)
PMV BLD AUTO: 10.2 FL (ref 9–12.9)
POTASSIUM SERPL-SCNC: 4.3 MMOL/L (ref 3.6–5.5)
POTASSIUM SERPL-SCNC: 4.4 MMOL/L (ref 3.6–5.5)
RBC # BLD AUTO: 4.54 M/UL (ref 4.2–5.4)
SODIUM SERPL-SCNC: 142 MMOL/L (ref 135–145)
SODIUM SERPL-SCNC: 142 MMOL/L (ref 135–145)
TRIGL SERPL-MCNC: 87 MG/DL (ref 0–149)
WBC # BLD AUTO: 5.3 K/UL (ref 4.8–10.8)

## 2022-11-30 PROCEDURE — 85025 COMPLETE CBC W/AUTO DIFF WBC: CPT

## 2022-11-30 PROCEDURE — 80061 LIPID PANEL: CPT

## 2022-11-30 PROCEDURE — 99213 OFFICE O/P EST LOW 20 MIN: CPT | Performed by: PHYSICIAN ASSISTANT

## 2022-11-30 PROCEDURE — 80048 BASIC METABOLIC PNL TOTAL CA: CPT

## 2022-11-30 PROCEDURE — 80048 BASIC METABOLIC PNL TOTAL CA: CPT | Mod: 91

## 2022-11-30 PROCEDURE — 36415 COLL VENOUS BLD VENIPUNCTURE: CPT

## 2022-11-30 RX ORDER — AMOXICILLIN AND CLAVULANATE POTASSIUM 875; 125 MG/1; MG/1
1 TABLET, FILM COATED ORAL 2 TIMES DAILY
Qty: 10 TABLET | Refills: 0 | Status: SHIPPED | OUTPATIENT
Start: 2022-11-30 | End: 2022-12-05

## 2022-11-30 ASSESSMENT — FIBROSIS 4 INDEX: FIB4 SCORE: 0.92

## 2022-11-30 NOTE — PROGRESS NOTES
Chief Complaint   Patient presents with    Cough        HPI: Patient is a 57 y.o. female complaining of 7 days of illness including: productive of clear sputum cough, nasal congestion, rhinorrhea, sore throat.   Mucus is: thick.  Similarly ill exposures: yes. Son and son's girlfriend  Treatments tried: alla-seltzer plus  She  reports that she has never smoked. She has never used smokeless tobacco..    Covid-19 test negative     ROS:  No fever, cough, nausea, changes in bowel movements or skin rash.      I reviewed the patient's medications, allergies and medical history:  Current Outpatient Medications   Medication Sig Dispense Refill    amoxicillin-clavulanate (AUGMENTIN) 875-125 MG Tab Take 1 Tablet by mouth 2 times a day for 5 days. 10 Tablet 0    amiodarone (CORDARONE) 200 MG Tab Take 1 Tablet by mouth every day.      doxycycline (MONODOX) 100 MG capsule Take 1 Capsule by mouth 2 times a day. 20 Capsule 0    acetaminophen (TYLENOL) 500 MG Tab Take 2 Tablets by mouth every 6 hours as needed for Moderate Pain. 30 Tablet 0    aspirin EC 81 MG EC tablet Take 1 Tablet by mouth every day. 30 Tablet     clopidogrel (PLAVIX) 75 MG Tab Take 1 Tablet by mouth every day. 30 Tablet 2    gabapentin (NEURONTIN) 100 MG Cap Take 1 Capsule by mouth 3 times a day. (Patient not taking: Reported on 11/16/2022) 90 Capsule 1    rosuvastatin (CRESTOR) 20 MG Tab Take 1 Tablet by mouth at bedtime. 30 Tablet 2    spironolactone (ALDACTONE) 25 MG Tab Take 1 Tablet by mouth every day. 30 Tablet 3    fluticasone (FLONASE) 50 MCG/ACT nasal spray Administer 1 Spray into each nostril 1 time a day as needed (Seasonal Allergies).      metoprolol SR (TOPROL XL) 100 MG TABLET SR 24 HR Take 1 Tablet by mouth every day. 90 Tablet 3    losartan-hydrochlorothiazide (HYZAAR) 100-25 MG per tablet Take 1 Tablet by mouth every day. 90 Tablet 3    levothyroxine (SYNTHROID) 100 MCG Tab Take 1 Tablet by mouth every morning on an empty stomach. 90 Tablet 3  "    No current facility-administered medications for this visit.     Ace inhibitors and Atorvastatin  Past Medical History:   Diagnosis Date    Anesthesia     slow to wake up    Heart palpitations     Hypertension     Hypothyroid     Vitamin D deficiency         EXAM:  /60   Pulse 72   Temp 36.6 °C (97.9 °F)   Resp 16   Ht 1.676 m (5' 6\")   Wt 88.9 kg (196 lb)   SpO2 97%   General: Alert, no conversational dyspnea or audible wheeze, non-toxic appearance.  Eyes: PERRL, conjunctiva slightly injected, no eye discharge.  Ears: Normal pinnae,TM's air/fluid interface bilaterally.  Nares: Patent with thick mucus.  Sinuses: tender over maxillary / frontal sinuses.  Throat: Erythematous injection without exudate.   Neck: Supple, with no adenopathy.  Lungs: Clear to auscultation bilaterally, no wheeze, crackles or rhonchi.   Heart: Regular rate without murmur.  Skin: Warm and dry without rash.     ASSESSMENT:   1. Acute non-recurrent maxillary sinusitis          PLAN:  1. As symptoms have been worsening over the last week, will treat with antibiotics.  2. Twice daily use of nasal saline rinse or Neti-Pot.  3. OTC anti-pyretics and decongestants as needed.  4. Follow-up in office or urgent care for worsening symptoms, difficulty breathing, lack of expected recovery, or should new symptoms or problems arise.    "

## 2022-12-06 ENCOUNTER — TELEPHONE (OUTPATIENT)
Dept: CARDIOLOGY | Facility: MEDICAL CENTER | Age: 57
End: 2022-12-06
Payer: COMMERCIAL

## 2022-12-06 NOTE — TELEPHONE ENCOUNTER
KM     Caller: Clary Bah    Topic/issue: Patient called in today asking if our office can release her to go back to work, or if the [facility] has to do this for her. She was given a release to go back to work on 12/12/22 however her job is stating the forms are incorrect and she needs a new release. Please advise.     Callback Number: [phone] (home)     Thank you,   Pauline ADAMES

## 2022-12-07 NOTE — TELEPHONE ENCOUNTER
KM    Caller: Clary Bah    Topic/issue: RETURNING RN CALL    Callback Number: [phone] (home)

## 2022-12-07 NOTE — TELEPHONE ENCOUNTER
Called pt [phone]   No answer, left VM to call [phone]  Regarding work letter? Need more information for KM such as where she works, what type of work, restrictions?

## 2022-12-09 ENCOUNTER — TELEPHONE (OUTPATIENT)
Dept: CARDIOLOGY | Facility: MEDICAL CENTER | Age: 57
End: 2022-12-09
Payer: COMMERCIAL

## 2022-12-09 NOTE — TELEPHONE ENCOUNTER
KM    Caller: - Nancie    Office Name, phone number, fax number[facility]   [facility]    Fax clearance to [phone]    Procedure Name:-  Cleaning     Procedure Scheduled Date: In office at this time0    Thank you,   Cindy CORREA

## 2022-12-13 NOTE — TELEPHONE ENCOUNTER
Letter generated for dental clearance per JA recommendations. Letter faxed to:  Dr. Jarquin - [facility]: [phone].   Scanned into [employer] with confirmation.

## 2022-12-14 NOTE — TELEPHONE ENCOUNTER
Called pt [phone]   No answer, left VM to call [phone]  Regarding work letter? Need more information for KM such as where she works, what type of work, restrictions?

## 2022-12-14 NOTE — TELEPHONE ENCOUNTER
KM     Caller: Clary Bah    Topic/issue: Patient is calling back to inform us that she is back to work and no longer needs the clearance from us, as it was taken care of by another department/provider. Please advise.     Callback Number: [phone] (home)     Thank you,   Pauline ADAMES

## 2023-01-17 ENCOUNTER — TELEPHONE (OUTPATIENT)
Dept: CARDIOLOGY | Facility: MEDICAL CENTER | Age: 58
End: 2023-01-17
Payer: COMMERCIAL

## 2023-01-17 DIAGNOSIS — I25.5 ISCHEMIC DILATED CARDIOMYOPATHY (HCC): ICD-10-CM

## 2023-01-17 DIAGNOSIS — E78.2 MIXED HYPERLIPIDEMIA: ICD-10-CM

## 2023-01-17 DIAGNOSIS — I50.9 HEART FAILURE, UNSPECIFIED HF CHRONICITY, UNSPECIFIED HEART FAILURE TYPE (HCC): ICD-10-CM

## 2023-01-17 DIAGNOSIS — I42.0 ISCHEMIC DILATED CARDIOMYOPATHY (HCC): ICD-10-CM

## 2023-01-17 DIAGNOSIS — I21.4 NSTEMI (NON-ST ELEVATED MYOCARDIAL INFARCTION) (HCC): ICD-10-CM

## 2023-01-17 DIAGNOSIS — Z95.1 S/P CABG X 2: ICD-10-CM

## 2023-01-17 RX ORDER — CLOPIDOGREL BISULFATE 75 MG/1
75 TABLET ORAL DAILY
Qty: 90 TABLET | Refills: 3 | Status: SHIPPED | OUTPATIENT
Start: 2023-01-17 | End: 2024-01-02

## 2023-01-17 RX ORDER — SPIRONOLACTONE 25 MG/1
25 TABLET ORAL DAILY
Qty: 90 TABLET | Refills: 3 | Status: SHIPPED | OUTPATIENT
Start: 2023-01-17 | End: 2024-02-13

## 2023-01-17 RX ORDER — ROSUVASTATIN CALCIUM 20 MG/1
20 TABLET, COATED ORAL
Qty: 90 TABLET | Refills: 3 | Status: SHIPPED | OUTPATIENT
Start: 2023-01-17 | End: 2024-01-02

## 2023-01-17 NOTE — TELEPHONE ENCOUNTER
Is the patient due for a refill? Yes    Was the patient seen the past year? Yes    Date of last office visit: 11/3/22    Does the patient have an upcoming appointment?  Yes   If yes, When? 2/13/23    Provider to refill:KM    Does the patients insurance require a 100 day supply?  No

## 2023-01-17 NOTE — TELEPHONE ENCOUNTER
KM     Caller: Clary Bah    Medication Name and Dosage:    rosuvastatin (CRESTOR) 20 MG Tab [[id number]      clopidogrel (PLAVIX) 75 MG Tab [[id number]    spironolactone (ALDACTONE) 25 MG Tab [[id number]    Medication amount left: 0    Preferred Pharmacy: [facility] on file     Other questions (Topic): Patient states that [facility] reached out to our office for refills and was informed that only the patient can do so. Please advise.     Callback Number (Will only call for issues): [phone] (home)     Thank you,   Pauline ADAMES

## 2023-01-17 NOTE — TELEPHONE ENCOUNTER
Refilled prescriptions for spironolactone, plavix, and rosuvastatin for 1 year supply. Recent lab work done 10/2022 (magnesium) and 11/2022 (CMP). LOV 11/03/2022 [facility]

## 2023-02-16 ENCOUNTER — OFFICE VISIT (OUTPATIENT)
Dept: CARDIOLOGY | Facility: MEDICAL CENTER | Age: 58
End: 2023-02-16
Payer: COMMERCIAL

## 2023-02-16 VITALS
BODY MASS INDEX: 30.37 KG/M2 | HEIGHT: 66 IN | SYSTOLIC BLOOD PRESSURE: 120 MMHG | OXYGEN SATURATION: 97 % | WEIGHT: 189 LBS | RESPIRATION RATE: 14 BRPM | DIASTOLIC BLOOD PRESSURE: 70 MMHG | HEART RATE: 56 BPM

## 2023-02-16 DIAGNOSIS — I42.0 ISCHEMIC DILATED CARDIOMYOPATHY (HCC): ICD-10-CM

## 2023-02-16 DIAGNOSIS — Z95.1 S/P CABG X 2: ICD-10-CM

## 2023-02-16 DIAGNOSIS — I21.4 NSTEMI (NON-ST ELEVATED MYOCARDIAL INFARCTION) (HCC): ICD-10-CM

## 2023-02-16 DIAGNOSIS — I10 ESSENTIAL HYPERTENSION: ICD-10-CM

## 2023-02-16 DIAGNOSIS — E78.2 MIXED HYPERLIPIDEMIA: ICD-10-CM

## 2023-02-16 DIAGNOSIS — I25.5 ISCHEMIC DILATED CARDIOMYOPATHY (HCC): ICD-10-CM

## 2023-02-16 PROCEDURE — 99214 OFFICE O/P EST MOD 30 MIN: CPT | Performed by: PHYSICIAN ASSISTANT

## 2023-02-16 RX ORDER — LOSARTAN POTASSIUM AND HYDROCHLOROTHIAZIDE 25; 100 MG/1; MG/1
1 TABLET ORAL DAILY
Qty: 90 TABLET | Refills: 3 | Status: SHIPPED | OUTPATIENT
Start: 2023-02-16 | End: 2024-03-22

## 2023-02-16 RX ORDER — METOPROLOL SUCCINATE 100 MG/1
100 TABLET, EXTENDED RELEASE ORAL DAILY
Qty: 90 TABLET | Refills: 3 | Status: SHIPPED | OUTPATIENT
Start: 2023-02-16

## 2023-02-16 ASSESSMENT — ENCOUNTER SYMPTOMS
PND: 0
BLOOD IN STOOL: 0
COUGH: 0
SENSORY CHANGE: 1
DIZZINESS: 1
ORTHOPNEA: 0
NAUSEA: 0
PALPITATIONS: 0
DEPRESSION: 0
SHORTNESS OF BREATH: 0
TINGLING: 1

## 2023-02-16 ASSESSMENT — FIBROSIS 4 INDEX: FIB4 SCORE: 0.92

## 2023-02-16 NOTE — PROGRESS NOTES
No chief complaint on file.     Subjective:   Clary Bah is a 57 y.o. female who presents today for follow-up with her son.    Patient of Dr. La.  Current medical problems include hypertension, hyperlipidemia and CCS of 0 in . In 2022 she had NSTEMI found to have multivessel disease on coronary angiogram. She was referred for bypass surgery due to her elevated glucose readings and young age.    Underwent two-vessel coronary artery bypass (LIMA-LAD, reverse SVG to posterolateral artery) by Dr. Smyth 10/18/2022.  Postoperatively she had atrial fibrillation RVR  for about 30 min which was treated with IV amiodarone otherwise she had an uncomplicated stay was discharged on oral amiodarone taper, aspirin, Plavix, beta-blocker, ARB, statin.    Clary has been doing well since her last visit.  She is walking but is hoping to do more swimming and Pilates.  She is unable to participate in ICR due to high co-pays.  She is not experiencing any exertional chest pressure or shortness of breath.  She has occasional twinges in her chest when she stretches but otherwise no pain in her sternotomy area.  She continues to have very sensitive skin in her right popliteal area where her vein was removed.  She is not experiencing any swelling in that site or in the distal extremity.    Her blood pressure has been well controlled on her current medications.  She occasionally feels lightheaded approximately 1 time per week but otherwise not experiencing adverse effects.  She is never seen her blood pressure this good.  Readings generally 110-120 systolic.      She has had no recurrence of her atrial fibrillation, she was highly symptomatic with it when she was in the hospital postop.  She completed the amiodarone course.    Her father had coronary artery disease with several heart attacks starting in his 60s or 70s.  He ultimately  from complications of a stroke.  No other family history of coronary artery disease  including 5 of her 6 siblings. Her brother  in his sleep at age 63 from possible heart attack.   Past Medical History:   Diagnosis Date    Anesthesia     slow to wake up    Heart palpitations     Hypertension     Hypothyroid     Vitamin D deficiency          Past Surgical History:   Procedure Laterality Date    MULTIPLE CORONARY ARTERY BYPASS ENDO VEIN HARVEST  10/12/2022    Procedure: CORONARY ARTERY BYPASS GRAFTING X2, ENDOSCOPIC VEIN HARVEST;  Surgeon: Coretta Smyth M.D.;  Location: SURGERY [facility];  Service: Cardiac    BEVERLY  10/12/2022    Procedure: ECHOCARDIOGRAM, TRANSESOPHAGEAL;  Surgeon: Coretta Smyth M.D.;  Location: SURGERY [facility];  Service: Cardiac    RECTOCELE REPAIR  3/15/2011    Performed by MARK RAMÍREZ at SURGERY SAME DAY [facility] ORS    BIOPSY GENERAL  10/20/08    Performed by MARK RAMÍREZ at SURGERY SAME DAY [facility] ORS    HYSTEROSCOPY WITH VIDEO OPERATIVE  10/20/08    Performed by MARK RAMÍREZ at SURGERY SAME DAY [facility] ORS    GYN SURGERY          BRIANNE BY LAPAROSCOPY           Family History   Problem Relation Age of Onset    Arthritis Mother     Hypertension Mother     Heart Disease Father 67        heart attack    Diabetes Father     Hypertension Sister          Social History     Tobacco Use   Smoking Status Never   Smokeless Tobacco Never          Social History     Substance and Sexual Activity   Alcohol Use Yes    Comment: occ         Allergies   Allergen Reactions    Ace Inhibitors Cough    Atorvastatin Unspecified     Tolerating crestor         Outpatient Encounter Medications as of 2023   Medication Sig Dispense Refill    metoprolol SR (TOPROL XL) 100 MG TABLET SR 24 HR Take 1 Tablet by mouth every day. 90 Tablet 3    losartan-hydrochlorothiazide (HYZAAR) 100-25 MG per tablet Take 1 Tablet by mouth every day. 90 Tablet 3    rosuvastatin (CRESTOR) 20 MG Tab Take 1 Tablet by mouth at bedtime. 90 Tablet 3    clopidogrel (PLAVIX) 75 MG  "Tab Take 1 Tablet by mouth every day. 90 Tablet 3    spironolactone (ALDACTONE) 25 MG Tab Take 1 Tablet by mouth every day. 90 Tablet 3    acetaminophen (TYLENOL) 500 MG Tab Take 2 Tablets by mouth every 6 hours as needed for Moderate Pain. 30 Tablet 0    aspirin EC 81 MG EC tablet Take 1 Tablet by mouth every day. 30 Tablet     fluticasone (FLONASE) 50 MCG/ACT nasal spray Administer 1 Spray into each nostril 1 time a day as needed (Seasonal Allergies).      levothyroxine (SYNTHROID) 100 MCG Tab Take 1 Tablet by mouth every morning on an empty stomach. 90 Tablet 3    [DISCONTINUED] amiodarone (CORDARONE) 200 MG Tab Take 1 Tablet by mouth every day.      [DISCONTINUED] doxycycline (MONODOX) 100 MG capsule Take 1 Capsule by mouth 2 times a day. 20 Capsule 0    [DISCONTINUED] gabapentin (NEURONTIN) 100 MG Cap Take 1 Capsule by mouth 3 times a day. (Patient not taking: Reported on 11/16/2022) 90 Capsule 1    [DISCONTINUED] metoprolol SR (TOPROL XL) 100 MG TABLET SR 24 HR Take 1 Tablet by mouth every day. 90 Tablet 3    [DISCONTINUED] losartan-hydrochlorothiazide (HYZAAR) 100-25 MG per tablet Take 1 Tablet by mouth every day. 90 Tablet 3     No facility-administered encounter medications on file as of 2/16/2023.         Review of Systems   Constitutional:         No unexpected weight changes   HENT:  Negative for nosebleeds.    Respiratory:  Negative for cough and shortness of breath.    Cardiovascular:  Negative for chest pain, palpitations, orthopnea, leg swelling and PND.   Gastrointestinal:  Negative for blood in stool, melena and nausea.   Genitourinary:  Negative for hematuria.   Neurological:  Positive for dizziness, tingling and sensory change (right thigh/knee).   Psychiatric/Behavioral:  Negative for depression.         Objective:   /70 (BP Location: Left arm, Patient Position: Sitting, BP Cuff Size: Adult)   Pulse (!) 56   Resp 14   Ht 1.676 m (5' 6\")   Wt 85.7 kg (189 lb)   LMP 06/01/2020 " (Approximate)   SpO2 97%   BMI 30.51 kg/m²        Physical Exam  Vitals reviewed.   Constitutional:       General: She is not in acute distress.     Appearance: Normal appearance.   HENT:      Head: Normocephalic and atraumatic.   Cardiovascular:      Rate and Rhythm: Normal rate and regular rhythm.      Pulses: Normal pulses.      Heart sounds: No murmur heard.  Pulmonary:      Effort: Pulmonary effort is normal. No respiratory distress.      Breath sounds: Normal breath sounds. No rales.   Abdominal:      General: There is no distension.      Palpations: Abdomen is soft.   Musculoskeletal:      Right lower leg: No edema.      Left lower leg: No edema.   Skin:     General: Skin is warm and dry.      Capillary Refill: Capillary refill takes less than 2 seconds.      Comments: Well-healed sternotomy incisions.  Well-healed incisions in right leg.  No swelling or nodule or keloid scar, tender to light touch.   Neurological:      General: No focal deficit present.      Mental Status: She is alert and oriented to person, place, and time.   Psychiatric:         Judgment: Judgment normal.       Op note 10/12/2022   PROCEDURE:Coronary artery bypass grafting x2  Left internal mammary artery to left anterior descending artery  Reversed saphenous vein graft to postero-lateral artery  Endo-vein procurement    [facility] 10/9/22  CORONARY ANGIOGRAPHY:  The left main coronary artery is a short tapering vessel with distal 20% disease that bifurcates into the left anterior descending and left circumflex coronary arteries.  The left anterior descending coronary artery is a heavily diseased vessel with ostial to proximal 50% disease, a long segment of mid 80% disease after the takeoff of the first diagonal branch and involving the ostium of the second diagonal branch, a relatively healthy mid-distal segment, and then tapering distal luminal irregularities.  It supplies two small diffusely diseased proximal diagonal branches and a  small-moderate third diagonal branch.  The left circumflex coronary artery is a large, nondominant vessel that supplies a large first obtuse marginal branch and a small second obtuse marginal branch and has luminal irregularities in the distribution.  The right coronary artery is a large, dominant vessel with ostial 50% disease, mid 30% disease, and a hazy distal bifurcation with ostial 60-70% disease in both a large posterior descending coronary and a large posterolateral branch.     IMPRESSION:  Extensively diseased left anterior descending coronary from the ostium to mid segment of the vessel with a relatively healthy mid-distal segment.  Angiographically moderate ostial and moderate-severe distal bifurcation right coronary artery disease.  Minimal luminal irregularities in the left circumflex coronary artery distribution.     RECOMMENDATIONS:  Return to floor with continued care per primary service.  TR band release per protocol.  Further evaluation of the patient's diabetes status as her glycosylated hemoglobin A1c was in the prediabetic range, however, consecutive fasting glucose measurements were in the diabetic range, although the patient is currently in a high stress situation.  Strong consideration for coronary artery bypass grafting if diabetes is confirmed given extensive left anterior descending coronary disease and distal bifurcation right coronary artery disease.  Continue optimal medical management of hypertension and coronary artery disease.  Assessment:     1. Essential hypertension  Basic Metabolic Panel    MAGNESIUM    metoprolol SR (TOPROL XL) 100 MG TABLET SR 24 HR    losartan-hydrochlorothiazide (HYZAAR) 100-25 MG per tablet      2. Ischemic dilated cardiomyopathy (HCC)  EC-ECHOCARDIOGRAM LTD W/O CONT      3. NSTEMI (non-ST elevated myocardial infarction) ([facility])  EC-ECHOCARDIOGRAM LTD W/O CONT      4. S/P CABG x 2  EC-ECHOCARDIOGRAM LTD W/O CONT      5. Mixed hyperlipidemia             Medical  Decision Making:  Today's Assessment / Plan:     NSTEMI on admission  Multivessel CAD  Hyperlipidemia  - S.P CABG x2 (LIMA-LAD, rSVG to PL branch)  -Continue aspirin 81 mg and P2 Y 12 inhibitor for 6-12mo given NSTEMI presentation   -Continue beta-blocker therapy  -Continue high intensity statin.  LDL under control  -Unable to participate in ICR due to high co-pays but encouraged activity on her own    Ischemic Cardiomyopathy, HFrEF NYHA II  - no HF symptoms on exam or by report  - continue BB, ARB and spironolactone  -Repeat limited echo prior to next appointment, if LVEF is less than 35% we will discuss ICD    Symptomatic hypotension, resolved  -She is on 2 diuretics (spironolactone and hydrochlorothiazide), will monitor her BMP in a few months.  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 LVH secondary to hypertensive disease    Post OP AF  - <24hrs, now in sinus. Finished Amiodarone course then can discontinue.  - call office or go to ER for AF with RVR    Prediabetes    BMP and echo  RTC in 6mo with new cardiologist.

## 2023-03-10 ENCOUNTER — OFFICE VISIT (OUTPATIENT)
Dept: MEDICAL GROUP | Facility: LAB | Age: 58
End: 2023-03-10
Payer: COMMERCIAL

## 2023-03-10 VITALS
BODY MASS INDEX: 30.37 KG/M2 | HEIGHT: 66 IN | RESPIRATION RATE: 12 BRPM | WEIGHT: 189 LBS | HEART RATE: 75 BPM | OXYGEN SATURATION: 98 % | SYSTOLIC BLOOD PRESSURE: 120 MMHG | TEMPERATURE: 97.3 F | DIASTOLIC BLOOD PRESSURE: 72 MMHG

## 2023-03-10 DIAGNOSIS — I10 ESSENTIAL HYPERTENSION: ICD-10-CM

## 2023-03-10 DIAGNOSIS — Z13.21 ENCOUNTER FOR VITAMIN DEFICIENCY SCREENING: ICD-10-CM

## 2023-03-10 DIAGNOSIS — Z95.1 S/P CABG X 2: ICD-10-CM

## 2023-03-10 DIAGNOSIS — Z13.1 DIABETES MELLITUS SCREENING: ICD-10-CM

## 2023-03-10 DIAGNOSIS — E06.3 HYPOTHYROIDISM, ACQUIRED, AUTOIMMUNE: ICD-10-CM

## 2023-03-10 PROCEDURE — 99214 OFFICE O/P EST MOD 30 MIN: CPT | Performed by: PHYSICIAN ASSISTANT

## 2023-03-10 ASSESSMENT — PATIENT HEALTH QUESTIONNAIRE - PHQ9: CLINICAL INTERPRETATION OF PHQ2 SCORE: 0

## 2023-03-10 ASSESSMENT — FIBROSIS 4 INDEX: FIB4 SCORE: 0.92

## 2023-03-10 NOTE — PROGRESS NOTES
Subjective:     CC: 3-month follow-up    HPI:   Clary here today with     Blood pressure  Normotensive today in clinic.  Denies hypertensive symptoms  Hyzaar 100-25 mg 1 tablet daily  Metoprolol  mg daily  Spironolactone 25 mg daily    Sleep continues to be somewhat disjointed.  Patient mainly attributes this to getting up to urinate at night secondary to the diuretics.  She has tried to mitigate her fluid intake in the evenings with some improvement    History of NSTEMI, status post CABG x2  Patient had appointment with cardiology 02/16/2023.  No changes in medications. Symptoms stable.  Plan for 6-month follow-up with updated BMP and echocardiogram    Due for updated thyroid labs, no longer following with endocrinology for management of hypothyroidism     ROS:  Gen: no fevers/chills, no changes in weight  Eyes: no changes in vision  ENT: no sore throat, no hearing loss, no bloody nose  Pulm: no sob, no cough  CV: no chest pain, no palpitations  GI: no nausea/vomiting, no diarrhea  : no dysuria  MSk: no myalgias  Skin: no rash  Neuro: no headaches, no numbness/tingling  Heme/Lymph: no easy bruising    Current Outpatient Medications Ordered in Epic   Medication Sig Dispense Refill    metoprolol SR (TOPROL XL) 100 MG TABLET SR 24 HR Take 1 Tablet by mouth every day. 90 Tablet 3    losartan-hydrochlorothiazide (HYZAAR) 100-25 MG per tablet Take 1 Tablet by mouth every day. 90 Tablet 3    rosuvastatin (CRESTOR) 20 MG Tab Take 1 Tablet by mouth at bedtime. 90 Tablet 3    clopidogrel (PLAVIX) 75 MG Tab Take 1 Tablet by mouth every day. 90 Tablet 3    spironolactone (ALDACTONE) 25 MG Tab Take 1 Tablet by mouth every day. 90 Tablet 3    acetaminophen (TYLENOL) 500 MG Tab Take 2 Tablets by mouth every 6 hours as needed for Moderate Pain. 30 Tablet 0    aspirin EC 81 MG EC tablet Take 1 Tablet by mouth every day. 30 Tablet     fluticasone (FLONASE) 50 MCG/ACT nasal spray Administer 1 Spray into each nostril 1 time a  "day as needed (Seasonal Allergies).      levothyroxine (SYNTHROID) 100 MCG Tab Take 1 Tablet by mouth every morning on an empty stomach. 90 Tablet 3     No current Epic-ordered facility-administered medications on file.         Objective:     Exam:  /72 (BP Location: Left arm, Patient Position: Sitting, BP Cuff Size: Adult)   Pulse 75   Temp 36.3 °C (97.3 °F) (Temporal)   Resp 12   Ht 1.676 m (5' 6\")   Wt 85.7 kg (189 lb)   LMP 06/01/2020 (Approximate)   SpO2 98%   BMI 30.51 kg/m²  Body mass index is 30.51 kg/m².    Gen: Alert and oriented, No apparent distress.  Neck: Neck is supple without lymphadenopathy.  Lungs: Normal effort, CTA bilaterally, no wheezes, rhonchi, or rales  CV: Regular rate and rhythm. No murmurs, rubs, or gallops.  Ext: No clubbing, cyanosis, edema.      Assessment & Plan:     57 y.o. female with the following -     1. Hypothyroidism, acquired, autoimmune  Chronic condition, stable   Continue current medications  Due for updated labs  - ANTITHYROGLOBULIN AB; Future  - THYROID PEROXIDASE  (TPO) AB; Future  - TSH WITH REFLEX TO FT4; Future    2. Diabetes mellitus screening  - HEMOGLOBIN A1C; Future    3. Encounter for vitamin deficiency screening  - VITAMIN D,25 HYDROXY (DEFICIENCY); Future    4. Essential hypertension  This is a chronic condition, controlled.  Blood pressure is at goal under 140/90 in the office today.  We will continue the current regimen.    5. S/P CABG x 2  Continue f/u with cardiology  Continue current medications        I spent a total of 20 minutes with record review, exam, communication with the patient, communication with other providers, and documentation of this encounter.      Return in about 3 months (around 6/10/2023).    Please note that this dictation was created using voice recognition software. I have made every reasonable attempt to correct obvious errors, but there may be errors of grammar and possibly content that I did not discover before " finalizing the note.

## 2023-03-13 ENCOUNTER — HOSPITAL ENCOUNTER (OUTPATIENT)
Dept: LAB | Facility: MEDICAL CENTER | Age: 58
End: 2023-03-13
Attending: PHYSICIAN ASSISTANT
Payer: COMMERCIAL

## 2023-03-13 DIAGNOSIS — Z13.21 ENCOUNTER FOR VITAMIN DEFICIENCY SCREENING: ICD-10-CM

## 2023-03-13 DIAGNOSIS — I10 ESSENTIAL HYPERTENSION: ICD-10-CM

## 2023-03-13 DIAGNOSIS — Z13.1 DIABETES MELLITUS SCREENING: ICD-10-CM

## 2023-03-13 DIAGNOSIS — E03.9 HYPOTHYROIDISM, UNSPECIFIED TYPE: ICD-10-CM

## 2023-03-13 LAB
ANION GAP SERPL CALC-SCNC: 10 MMOL/L (ref 7–16)
BUN SERPL-MCNC: 20 MG/DL (ref 8–22)
CALCIUM SERPL-MCNC: 9.9 MG/DL (ref 8.5–10.5)
CHLORIDE SERPL-SCNC: 105 MMOL/L (ref 96–112)
CO2 SERPL-SCNC: 23 MMOL/L (ref 20–33)
CREAT SERPL-MCNC: 0.96 MG/DL (ref 0.5–1.4)
EST. AVERAGE GLUCOSE BLD GHB EST-MCNC: 114 MG/DL
GFR SERPLBLD CREATININE-BSD FMLA CKD-EPI: 69 ML/MIN/1.73 M 2
GLUCOSE SERPL-MCNC: 101 MG/DL (ref 65–99)
HBA1C MFR BLD: 5.6 % (ref 4–5.6)
MAGNESIUM SERPL-MCNC: 1.8 MG/DL (ref 1.5–2.5)
POTASSIUM SERPL-SCNC: 4.3 MMOL/L (ref 3.6–5.5)
SODIUM SERPL-SCNC: 138 MMOL/L (ref 135–145)

## 2023-03-13 PROCEDURE — 36415 COLL VENOUS BLD VENIPUNCTURE: CPT

## 2023-03-13 PROCEDURE — 86800 THYROGLOBULIN ANTIBODY: CPT

## 2023-03-13 PROCEDURE — 84439 ASSAY OF FREE THYROXINE: CPT

## 2023-03-13 PROCEDURE — 82306 VITAMIN D 25 HYDROXY: CPT

## 2023-03-13 PROCEDURE — 84443 ASSAY THYROID STIM HORMONE: CPT

## 2023-03-13 PROCEDURE — 86376 MICROSOMAL ANTIBODY EACH: CPT

## 2023-03-13 PROCEDURE — 83036 HEMOGLOBIN GLYCOSYLATED A1C: CPT

## 2023-03-13 PROCEDURE — 83735 ASSAY OF MAGNESIUM: CPT

## 2023-03-13 PROCEDURE — 80048 BASIC METABOLIC PNL TOTAL CA: CPT

## 2023-03-14 LAB
25(OH)D3 SERPL-MCNC: 24 NG/ML (ref 30–100)
T4 FREE SERPL-MCNC: 1.81 NG/DL (ref 0.93–1.7)
THYROPEROXIDASE AB SERPL-ACNC: 31 IU/ML (ref 0–9)
TSH SERPL DL<=0.005 MIU/L-ACNC: 0.06 UIU/ML (ref 0.38–5.33)

## 2023-03-15 LAB — THYROGLOB AB SERPL-ACNC: 6 IU/ML (ref 0–4)

## 2023-05-28 DIAGNOSIS — E03.9 HYPOTHYROIDISM, UNSPECIFIED TYPE: ICD-10-CM

## 2023-05-30 RX ORDER — LEVOTHYROXINE SODIUM 0.1 MG/1
TABLET ORAL
Qty: 90 TABLET | Refills: 0 | Status: SHIPPED | OUTPATIENT
Start: 2023-05-30 | End: 2023-08-28

## 2023-05-30 NOTE — TELEPHONE ENCOUNTER
Called pt. To advise.   Richardson Mandel,    Here are the 2 mouthguard devices that I have heard the most positive response for.    [employer]  [email or web address]    [employer]  [email or web address]    Arnol Alex MD    Sleep Medicine  [place], [place]  Main Office: [phone]  [facility] [address], [address]  Schedule visits: [phone]  Main Office: [phone]  Fax: [phone]

## 2023-06-09 ENCOUNTER — HOSPITAL ENCOUNTER (OUTPATIENT)
Dept: CARDIOLOGY | Facility: MEDICAL CENTER | Age: 58
End: 2023-06-09
Attending: PHYSICIAN ASSISTANT
Payer: COMMERCIAL

## 2023-06-09 DIAGNOSIS — I25.5 ISCHEMIC DILATED CARDIOMYOPATHY (HCC): ICD-10-CM

## 2023-06-09 DIAGNOSIS — Z95.1 S/P CABG X 2: ICD-10-CM

## 2023-06-09 DIAGNOSIS — I21.4 NSTEMI (NON-ST ELEVATED MYOCARDIAL INFARCTION) (HCC): ICD-10-CM

## 2023-06-09 DIAGNOSIS — I42.0 ISCHEMIC DILATED CARDIOMYOPATHY (HCC): ICD-10-CM

## 2023-06-09 LAB
LV EJECT FRACT MOD 2C 99903: 72.21
LV EJECT FRACT MOD 4C 99902: 84.04
LV EJECT FRACT MOD BP 99901: 79.82

## 2023-06-09 PROCEDURE — 93325 DOPPLER ECHO COLOR FLOW MAPG: CPT

## 2023-06-09 PROCEDURE — 93308 TTE F-UP OR LMTD: CPT | Mod: 26 | Performed by: INTERNAL MEDICINE

## 2023-06-09 PROCEDURE — 93321 DOPPLER ECHO F-UP/LMTD STD: CPT | Mod: 26 | Performed by: INTERNAL MEDICINE

## 2023-06-09 PROCEDURE — 93325 DOPPLER ECHO COLOR FLOW MAPG: CPT | Mod: 26 | Performed by: INTERNAL MEDICINE

## 2023-06-12 ENCOUNTER — OFFICE VISIT (OUTPATIENT)
Dept: MEDICAL GROUP | Facility: LAB | Age: 58
End: 2023-06-12
Payer: COMMERCIAL

## 2023-06-12 VITALS
HEART RATE: 64 BPM | WEIGHT: 190 LBS | TEMPERATURE: 97.3 F | SYSTOLIC BLOOD PRESSURE: 132 MMHG | BODY MASS INDEX: 30.53 KG/M2 | OXYGEN SATURATION: 96 % | RESPIRATION RATE: 14 BRPM | DIASTOLIC BLOOD PRESSURE: 70 MMHG | HEIGHT: 66 IN

## 2023-06-12 DIAGNOSIS — E06.3 HYPOTHYROIDISM, ACQUIRED, AUTOIMMUNE: ICD-10-CM

## 2023-06-12 DIAGNOSIS — Z12.31 ENCOUNTER FOR SCREENING MAMMOGRAM FOR MALIGNANT NEOPLASM OF BREAST: ICD-10-CM

## 2023-06-12 DIAGNOSIS — I10 ESSENTIAL HYPERTENSION: ICD-10-CM

## 2023-06-12 DIAGNOSIS — R73.03 PREDIABETES: ICD-10-CM

## 2023-06-12 DIAGNOSIS — I25.10 CORONARY ARTERY DISEASE INVOLVING NATIVE HEART WITHOUT ANGINA PECTORIS, UNSPECIFIED VESSEL OR LESION TYPE: ICD-10-CM

## 2023-06-12 PROCEDURE — 3075F SYST BP GE 130 - 139MM HG: CPT | Performed by: PHYSICIAN ASSISTANT

## 2023-06-12 PROCEDURE — 3078F DIAST BP <80 MM HG: CPT | Performed by: PHYSICIAN ASSISTANT

## 2023-06-12 PROCEDURE — 99214 OFFICE O/P EST MOD 30 MIN: CPT | Performed by: PHYSICIAN ASSISTANT

## 2023-06-12 ASSESSMENT — FIBROSIS 4 INDEX: FIB4 SCORE: 0.92

## 2023-06-12 NOTE — RESULT ENCOUNTER NOTE
Ceasar,   Your heart pump function has recovered since the surgery. Please make sure you are staying hydrated since you are on 2 different diuretics. If you are still feeling too lightheaded then we'll need to reduce the spironolactone to 1/2 pill.     Let me know if you have questions.   Saima

## 2023-06-12 NOTE — PROGRESS NOTES
Subjective:     CC: 3-month follow-up    HPI:   Armen here today with     Hypertension  Well-controlled today in clinic  Compliant with medication   Denies medication side effects    Hypothyroidism  Most recent TSH suggested overtreatment. Pt has been stable on her current dose of levothyroxine for several years. Pt does report feeling slightly jittery lately.  Denies aberrant dosing with this medication    Coronary artery disease  Appt with cardiology 08/09/2023  Echo completed 05/2023 - LV function has recovered  No recent medication changes.  Denies any recent episodes of chest pain, shortness of breath, palpitations, syncope, presyncope, claudication    Patient has not been taking vitamin D supplementation, she was slightly low on most recent labs    ROS:  ROS negative except as indicated above    Current Outpatient Medications Ordered in Epic   Medication Sig Dispense Refill    levothyroxine (SYNTHROID) 100 MCG Tab TAKE ONE TABLET BY MOUTH IN THE MORNING ON AN EMPTY STOMACH 90 Tablet 0    metoprolol SR (TOPROL XL) 100 MG TABLET SR 24 HR Take 1 Tablet by mouth every day. 90 Tablet 3    losartan-hydrochlorothiazide (HYZAAR) 100-25 MG per tablet Take 1 Tablet by mouth every day. 90 Tablet 3    rosuvastatin (CRESTOR) 20 MG Tab Take 1 Tablet by mouth at bedtime. 90 Tablet 3    clopidogrel (PLAVIX) 75 MG Tab Take 1 Tablet by mouth every day. 90 Tablet 3    spironolactone (ALDACTONE) 25 MG Tab Take 1 Tablet by mouth every day. 90 Tablet 3    acetaminophen (TYLENOL) 500 MG Tab Take 2 Tablets by mouth every 6 hours as needed for Moderate Pain. 30 Tablet 0    aspirin EC 81 MG EC tablet Take 1 Tablet by mouth every day. 30 Tablet     fluticasone (FLONASE) 50 MCG/ACT nasal spray Administer 1 Spray into each nostril 1 time a day as needed (Seasonal Allergies).       No current Epic-ordered facility-administered medications on file.       Objective:     Exam:  /70 (BP Location: Left arm, Patient Position: Sitting, BP  "Cuff Size: Adult)   Pulse 64   Temp 36.3 °C (97.3 °F)   Resp 14   Ht 1.676 m (5' 6\")   Wt 86.2 kg (190 lb)   LMP 06/01/2020 (Approximate)   SpO2 96%   BMI 30.67 kg/m²  Body mass index is 30.67 kg/m².    Gen: Alert and oriented, No apparent distress.  Neck: Neck is supple without lymphadenopathy.  Lungs: Normal effort, CTA bilaterally, no wheezes, rhonchi, or rales  CV: Regular rate and rhythm. No murmurs, rubs, or gallops.  Ext: No clubbing, cyanosis, edema.      Assessment & Plan:     57 y.o. female with the following -     1. Hypothyroidism, acquired, autoimmune  Chronic condition  Plan to recheck thyroid labs, if TSH is still outside of range plan to titrate Synthroid dose  - TSH; Future  - FREE THYROXINE; Future  - TRIIDOTHYRONINE; Future    2. Encounter for screening mammogram for malignant neoplasm of breast  - MA-SCREENING MAMMO BILAT W/TOMOSYNTHESIS W/CAD; Future    3. Coronary artery disease involving native heart without angina pectoris, unspecified vessel or lesion type  Chronic condition  Reviewed updated echo with patient  Continue current medications  Continue follow-up with cardiology as scheduled    4. Prediabetes  Improved on most recent labs, continue to monitor  - HEMOGLOBIN A1C; Future    5. Essential hypertension  Chronic condition, well-controlled  Continue current medication      I spent a total of 16 minutes with record review, exam, communication with the patient, communication with other providers, and documentation of this encounter.      No follow-ups on file.    Please note that this dictation was created using voice recognition software. I have made every reasonable attempt to correct obvious errors, but there may be errors of grammar and possibly content that I did not discover before finalizing the note.        "

## 2023-06-28 ENCOUNTER — HOSPITAL ENCOUNTER (OUTPATIENT)
Dept: RADIOLOGY | Facility: MEDICAL CENTER | Age: 58
End: 2023-06-28
Attending: PHYSICIAN ASSISTANT
Payer: COMMERCIAL

## 2023-06-28 DIAGNOSIS — Z12.31 ENCOUNTER FOR SCREENING MAMMOGRAM FOR MALIGNANT NEOPLASM OF BREAST: ICD-10-CM

## 2023-06-28 PROCEDURE — 77063 BREAST TOMOSYNTHESIS BI: CPT

## 2023-07-24 ENCOUNTER — HOSPITAL ENCOUNTER (OUTPATIENT)
Dept: LAB | Facility: MEDICAL CENTER | Age: 58
End: 2023-07-24
Attending: PHYSICIAN ASSISTANT
Payer: COMMERCIAL

## 2023-07-24 DIAGNOSIS — E06.3 HYPOTHYROIDISM, ACQUIRED, AUTOIMMUNE: ICD-10-CM

## 2023-07-24 DIAGNOSIS — R73.03 PREDIABETES: ICD-10-CM

## 2023-07-24 DIAGNOSIS — Z13.1 DIABETES MELLITUS SCREENING: ICD-10-CM

## 2023-07-24 LAB
EST. AVERAGE GLUCOSE BLD GHB EST-MCNC: 123 MG/DL
HBA1C MFR BLD: 5.9 % (ref 4–5.6)

## 2023-07-24 PROCEDURE — 84443 ASSAY THYROID STIM HORMONE: CPT

## 2023-07-24 PROCEDURE — 83036 HEMOGLOBIN GLYCOSYLATED A1C: CPT

## 2023-07-24 PROCEDURE — 84480 ASSAY TRIIODOTHYRONINE (T3): CPT

## 2023-07-24 PROCEDURE — 36415 COLL VENOUS BLD VENIPUNCTURE: CPT

## 2023-07-24 PROCEDURE — 84439 ASSAY OF FREE THYROXINE: CPT

## 2023-07-25 LAB
T3 SERPL-MCNC: 78.4 NG/DL (ref 60–181)
T4 FREE SERPL-MCNC: 1.55 NG/DL (ref 0.93–1.7)
TSH SERPL DL<=0.005 MIU/L-ACNC: 0.45 UIU/ML (ref 0.38–5.33)

## 2023-08-09 ENCOUNTER — OFFICE VISIT (OUTPATIENT)
Dept: CARDIOLOGY | Facility: MEDICAL CENTER | Age: 58
End: 2023-08-09
Attending: INTERNAL MEDICINE
Payer: COMMERCIAL

## 2023-08-09 VITALS
WEIGHT: 188 LBS | BODY MASS INDEX: 30.22 KG/M2 | HEIGHT: 66 IN | RESPIRATION RATE: 14 BRPM | OXYGEN SATURATION: 96 % | SYSTOLIC BLOOD PRESSURE: 136 MMHG | DIASTOLIC BLOOD PRESSURE: 72 MMHG | HEART RATE: 64 BPM

## 2023-08-09 DIAGNOSIS — I25.10 CORONARY ARTERY DISEASE INVOLVING NATIVE CORONARY ARTERY OF NATIVE HEART WITHOUT ANGINA PECTORIS: ICD-10-CM

## 2023-08-09 DIAGNOSIS — I97.89 POSTOPERATIVE ATRIAL FIBRILLATION (HCC): ICD-10-CM

## 2023-08-09 DIAGNOSIS — I48.91 POSTOPERATIVE ATRIAL FIBRILLATION (HCC): ICD-10-CM

## 2023-08-09 DIAGNOSIS — I10 ESSENTIAL HYPERTENSION: ICD-10-CM

## 2023-08-09 DIAGNOSIS — E78.2 MIXED HYPERLIPIDEMIA: ICD-10-CM

## 2023-08-09 DIAGNOSIS — I47.10 SVT (SUPRAVENTRICULAR TACHYCARDIA) (HCC): ICD-10-CM

## 2023-08-09 DIAGNOSIS — Z95.1 S/P CABG X 2: ICD-10-CM

## 2023-08-09 PROCEDURE — 3078F DIAST BP <80 MM HG: CPT | Performed by: INTERNAL MEDICINE

## 2023-08-09 PROCEDURE — 99214 OFFICE O/P EST MOD 30 MIN: CPT | Performed by: INTERNAL MEDICINE

## 2023-08-09 PROCEDURE — 99212 OFFICE O/P EST SF 10 MIN: CPT | Performed by: INTERNAL MEDICINE

## 2023-08-09 PROCEDURE — 3075F SYST BP GE 130 - 139MM HG: CPT | Performed by: INTERNAL MEDICINE

## 2023-08-09 ASSESSMENT — FIBROSIS 4 INDEX: FIB4 SCORE: 0.92

## 2023-08-09 NOTE — PROGRESS NOTES
Cardiology Follow-up Consultation Note    Date of note:    8/9/2023  Primary Care Provider: Neena Dye P.A.-C.    Name:             Armen Bah   YOB: 1965  MRN:               [id number]    Chief Complaint   Patient presents with    Supraventricular Tachycardia (SVT)    MI (Non ST Segment Elevation MI)    Hypertension       HISTORY OF PRESENT ILLNESS  Ms. Armen Bah is a 57 y.o. female who returns to see us for follow-up of CAD s/p CABG 10/2022 (LIMA-LAD, rSVG-PL artery), HTN and dyslipidemia    Pertinent history:  In October 2022 she had NSTEMI found to have multivessel disease on coronary angiogram. She was referred for bypass surgery due to her elevated glucose readings and young age.  Underwent two-vessel coronary artery bypass (LIMA-LAD, reverse SVG to posterolateral artery) by Dr. Smyth 10/18/2022.    Postoperatively she had atrial fibrillation RVR  for about 30 min which was treated with IV amiodarone.  No known recurrence since.    Last clinic visit: 2/16/2023 with ELIZABETH Boyd PP of Dr. Dunn. Is new to me.    Interim History:  Since her last visit, has been doing well from a cardiac perspective.  Denies any concerning symptoms of chest pain, pressure or shortness of breath.      Past Medical History:   Diagnosis Date    Anesthesia     slow to wake up    Heart palpitations     Hypertension     Hypothyroid     Vitamin D deficiency          Past Surgical History:   Procedure Laterality Date    MULTIPLE CORONARY ARTERY BYPASS ENDO VEIN HARVEST  10/12/2022    Procedure: CORONARY ARTERY BYPASS GRAFTING X2, ENDOSCOPIC VEIN HARVEST;  Surgeon: Coretta Smyth M.D.;  Location: SURGERY [facility];  Service: Cardiac    BEVERLY  10/12/2022    Procedure: ECHOCARDIOGRAM, TRANSESOPHAGEAL;  Surgeon: Coretta Smyth M.D.;  Location: SURGERY [facility];  Service: Cardiac    RECTOCELE REPAIR  3/15/2011    Performed by MARK RAMÍREZ at SURGERY SAME DAY [facility]  ORS    BIOPSY GENERAL  10/20/08    Performed by MARK RAMÍREZ at SURGERY SAME DAY [facility] ORS    HYSTEROSCOPY WITH VIDEO OPERATIVE  10/20/08    Performed by MARK RAMÍREZ at SURGERY SAME DAY [facility]    GYN SURGERY          BRIANNE BY LAPAROSCOPY           Current Outpatient Medications   Medication Sig Dispense Refill    levothyroxine (SYNTHROID) 100 MCG Tab TAKE ONE TABLET BY MOUTH IN THE MORNING ON AN EMPTY STOMACH 90 Tablet 0    metoprolol SR (TOPROL XL) 100 MG TABLET SR 24 HR Take 1 Tablet by mouth every day. 90 Tablet 3    losartan-hydrochlorothiazide (HYZAAR) 100-25 MG per tablet Take 1 Tablet by mouth every day. 90 Tablet 3    rosuvastatin (CRESTOR) 20 MG Tab Take 1 Tablet by mouth at bedtime. 90 Tablet 3    clopidogrel (PLAVIX) 75 MG Tab Take 1 Tablet by mouth every day. 90 Tablet 3    spironolactone (ALDACTONE) 25 MG Tab Take 1 Tablet by mouth every day. 90 Tablet 3    acetaminophen (TYLENOL) 500 MG Tab Take 2 Tablets by mouth every 6 hours as needed for Moderate Pain. 30 Tablet 0    fluticasone (FLONASE) 50 MCG/ACT nasal spray Administer 1 Spray into each nostril 1 time a day as needed (Seasonal Allergies).       No current facility-administered medications for this visit.         Allergies   Allergen Reactions    Ace Inhibitors Cough    Atorvastatin Unspecified     Tolerating crestor         Family History   Problem Relation Age of Onset    Arthritis Mother     Hypertension Mother     Heart Disease Father 67        heart attack    Diabetes Father     Hypertension Sister          Social History     Socioeconomic History    Marital status:      Spouse name: Not on file    Number of children: Not on file    Years of education: Not on file    Highest education level: Not on file   Occupational History    Not on file   Tobacco Use    Smoking status: Never    Smokeless tobacco: Never   Vaping Use    Vaping Use: Never used   Substance and Sexual Activity    Alcohol use: Yes      "Comment: occ    Drug use: No    Sexual activity: Yes     Partners: Male     Comment: vasectomy   Other Topics Concern    Not on file   Social History Narrative    Not on file     Social Determinants of Health     Financial Resource Strain: Not on file   Food Insecurity: Not on file   Transportation Needs: Not on file   Physical Activity: Not on file   Stress: Not on file   Social Connections: Not on file   Intimate Partner Violence: Not on file   Housing Stability: Not on file         Physical Exam:  Ambulatory Vitals  /72 (BP Location: Left arm, Patient Position: Sitting, BP Cuff Size: Adult)   Pulse 64   Resp 14   Ht 1.676 m (5' 6\")   Wt 85.3 kg (188 lb)   SpO2 96%    Oxygen Therapy:     BP Readings from Last 4 Encounters:   08/09/23 136/72   06/12/23 132/70   03/10/23 120/72   02/16/23 120/70       Weight/BMI: Body mass index is 30.34 kg/m².  Wt Readings from Last 4 Encounters:   08/09/23 85.3 kg (188 lb)   06/12/23 86.2 kg (190 lb)   03/10/23 85.7 kg (189 lb)   02/16/23 85.7 kg (189 lb)       GEN: Well developed, well nourished and in no acute distress.  HEART: no significant JVD, regular rate and rhythm, normal S1 and S2, no murmurs, no third heart sounds, normal cardiac palpation  LUNG: clear to auscultation bilaterally, no wheezing, no crackles, normal respiratory effort on room air  ABDOMEN: soft, non-tender, non-distended, normal bowel sounds throughout  EXTREMITIES: no peripheral edema noted  VASCULAR: no significantly elevated jugular venous pressure, no carotid bruits noted, radial pulses 2+ and equal      Lab Data Review:  Lab Results   Component Value Date/Time    CHOLSTRLTOT 110 11/30/2022 09:16 AM    LDL 58 11/30/2022 09:16 AM    HDL 35 (A) 11/30/2022 09:16 AM    TRIGLYCERIDE 87 11/30/2022 09:16 AM       Lab Results   Component Value Date/Time    SODIUM 138 03/13/2023 08:58 AM    POTASSIUM 4.3 03/13/2023 08:58 AM    CHLORIDE 105 03/13/2023 08:58 AM    CO2 23 03/13/2023 08:58 AM    GLUCOSE " 101 (H) 03/13/2023 08:58 AM    BUN 20 03/13/2023 08:58 AM    CREATININE 0.96 03/13/2023 08:58 AM    CREATININE 0.72 01/21/2011 10:15 AM    BUNCREATRAT 19 07/06/2018 07:52 AM    BUNCREATRAT 15 01/21/2011 10:15 AM    GLOMRATE >59 01/21/2011 10:15 AM     Lab Results   Component Value Date/Time    ALKPHOSPHAT 99 10/11/2022 08:32 PM    ASTSGOT 39 10/11/2022 08:32 PM    ALTSGPT 48 10/11/2022 08:32 PM    TBILIRUBIN 0.2 10/11/2022 08:32 PM      Lab Results   Component Value Date/Time    WBC 5.3 11/30/2022 09:16 AM    WBC 6.7 01/21/2011 10:15 AM     Lab Results   Component Value Date/Time    HBA1C 5.9 (H) 07/24/2023 10:22 AM    HBA1C 5.6 03/13/2023 08:56 AM       Cardiac Imaging and Procedures Review:    EKG dated 11/3/2022: My personal interpretation is sinus rhythm, old inferior infarct    Echo dated 6/9/2023, personally interpreted:   Hyperdynamic LV function with no WMA    Echo dated 10/11/2022, personally interpreted:   LVEF 60%, hypokinetic septum    [facility] (10/9/2022):   IMPRESSION:  Extensively diseased left anterior descending coronary from the ostium to mid segment of the vessel with a relatively healthy mid-distal segment.  Angiographically moderate ostial and moderate-severe distal bifurcation right coronary artery disease.  Minimal luminal irregularities in the left circumflex coronary artery distribution.  CORONARY ANGIOGRAPHY:  The left main coronary artery is a short tapering vessel with distal 20% disease that bifurcates into the left anterior descending and left circumflex coronary arteries.  The left anterior descending coronary artery is a heavily diseased vessel with ostial to proximal 50% disease, a long segment of mid 80% disease after the takeoff of the first diagonal branch and involving the ostium of the second diagonal branch, a relatively healthy mid-distal segment, and then tapering distal luminal irregularities.  It supplies two small diffusely diseased proximal diagonal branches and a  small-moderate third diagonal branch.  The left circumflex coronary artery is a large, nondominant vessel that supplies a large first obtuse marginal branch and a small second obtuse marginal branch and has luminal irregularities in the distribution.  The right coronary artery is a large, dominant vessel with ostial 50% disease, mid 30% disease, and a hazy distal bifurcation with ostial 60-70% disease in both a large posterior descending coronary and a large posterolateral branch.      Radiology test Review:  BL Carotid US 10/10/2022:   Mild bilateral internal carotid artery stenosis (<50%).     CCS (1/24/2020):   Coronary calcification:  LMA - 0.0  LCX - 0.0  LAD - 0.0  RCA - 0.0  PDA - 0.0  Total Calcium Score: 0.0      Assessment & Plan     1. Essential hypertension        2. Mixed hyperlipidemia        3. SVT (supraventricular tachycardia) (HCC)        4. S/P CABG x 2        5. Coronary artery disease involving native coronary artery of native heart without angina pectoris        6. Postoperative atrial fibrillation (HCC)              Doing well from a cardiac perspective without anginal symptoms.  Still on DAPT therapy with aspirin and Plavix.  Can discontinue aspirin, continue Plavix 75 mg daily ideally indefinitely.    For hypertension, blood pressure slightly above goal.  We discussed low-salt diet.  Continue Hyzaar, spironolactone and metoprolol XL at current doses.  If average blood pressure continues to remain above 120/70 after lifestyle and dietary modifications, will start additional antihypertensive medications.    Lipid panel reviewed with LDL within goal.  Continue rosuvastatin 20 mg daily.    I have independently interpreted test results and discussed results and management with the patient.    All of patient's excellent questions were answered to the best of my knowledge and to her satisfaction.  It was a pleasure seeing Ms. Armen Bah in my clinic today.  RTC 3 months.  Patient is aware to call  the cardiology clinic with any questions or concerns.      Micheal Liu MD  [facility]  [facility], [facility].  [address], [place] [address]  [place] [address]  Phone: [phone]  Fax: [phone]    Please note that this dictation was created using voice recognition software. I have made every reasonable attempt to correct obvious errors, but it is possible there are errors of grammar and possibly content that I did not discover before finalizing the note.

## 2023-08-11 PROBLEM — I25.10 CORONARY ARTERY DISEASE INVOLVING NATIVE CORONARY ARTERY OF NATIVE HEART WITHOUT ANGINA PECTORIS: Status: ACTIVE | Noted: 2023-08-11

## 2023-08-11 PROBLEM — I48.91 POSTOPERATIVE ATRIAL FIBRILLATION (HCC): Status: ACTIVE | Noted: 2023-08-11

## 2023-08-11 PROBLEM — I97.89 POSTOPERATIVE ATRIAL FIBRILLATION (HCC): Status: ACTIVE | Noted: 2023-08-11

## 2023-08-21 ENCOUNTER — PATIENT MESSAGE (OUTPATIENT)
Dept: CARDIOLOGY | Facility: MEDICAL CENTER | Age: 58
End: 2023-08-21
Payer: COMMERCIAL

## 2023-08-22 VITALS — DIASTOLIC BLOOD PRESSURE: 67 MMHG | SYSTOLIC BLOOD PRESSURE: 117 MMHG

## 2023-08-22 NOTE — PATIENT COMMUNICATION
-Patient BP goal 120/70 per DA OV note 8/9/23  -Blood pressures are both above and below goal, but  average of all nine BP readings is 117/67.     DA: Please advise

## 2023-08-22 NOTE — TELEPHONE ENCOUNTER
Please let the patient know that these blood pressure readings are within goal.  No changes to medications.    Would you be able to update the flowsheet with /67?  Thanks.

## 2023-08-23 NOTE — PATIENT COMMUNICATION
Micheal Liu M.D.  You 2 hours ago (2:50 PM)     Please let the patient know that these blood pressure readings are within goal.  No changes to medications.     Would you be able to update the flowsheet with /67?  Thanks

## 2023-08-26 DIAGNOSIS — E03.9 HYPOTHYROIDISM, UNSPECIFIED TYPE: ICD-10-CM

## 2023-08-28 RX ORDER — LEVOTHYROXINE SODIUM 0.1 MG/1
TABLET ORAL
Qty: 90 TABLET | Refills: 3 | Status: SHIPPED | OUTPATIENT
Start: 2023-08-28

## 2023-11-15 ENCOUNTER — OFFICE VISIT (OUTPATIENT)
Dept: CARDIOLOGY | Facility: MEDICAL CENTER | Age: 58
End: 2023-11-15
Attending: INTERNAL MEDICINE
Payer: COMMERCIAL

## 2023-11-15 VITALS
DIASTOLIC BLOOD PRESSURE: 80 MMHG | RESPIRATION RATE: 14 BRPM | OXYGEN SATURATION: 95 % | HEIGHT: 66 IN | SYSTOLIC BLOOD PRESSURE: 148 MMHG | BODY MASS INDEX: 30.7 KG/M2 | WEIGHT: 191 LBS | HEART RATE: 65 BPM

## 2023-11-15 DIAGNOSIS — Z95.1 S/P CABG X 2: ICD-10-CM

## 2023-11-15 DIAGNOSIS — I48.91 POSTOPERATIVE ATRIAL FIBRILLATION (HCC): ICD-10-CM

## 2023-11-15 DIAGNOSIS — I21.4 NSTEMI (NON-ST ELEVATED MYOCARDIAL INFARCTION) (HCC): ICD-10-CM

## 2023-11-15 DIAGNOSIS — I47.10 SVT (SUPRAVENTRICULAR TACHYCARDIA) (HCC): ICD-10-CM

## 2023-11-15 DIAGNOSIS — I97.89 POSTOPERATIVE ATRIAL FIBRILLATION (HCC): ICD-10-CM

## 2023-11-15 DIAGNOSIS — E78.2 MIXED HYPERLIPIDEMIA: ICD-10-CM

## 2023-11-15 DIAGNOSIS — I10 ESSENTIAL HYPERTENSION: ICD-10-CM

## 2023-11-15 DIAGNOSIS — I25.10 CORONARY ARTERY DISEASE INVOLVING NATIVE CORONARY ARTERY OF NATIVE HEART WITHOUT ANGINA PECTORIS: ICD-10-CM

## 2023-11-15 PROCEDURE — 99212 OFFICE O/P EST SF 10 MIN: CPT | Performed by: INTERNAL MEDICINE

## 2023-11-15 PROCEDURE — 3077F SYST BP >= 140 MM HG: CPT | Performed by: INTERNAL MEDICINE

## 2023-11-15 PROCEDURE — 99214 OFFICE O/P EST MOD 30 MIN: CPT | Performed by: INTERNAL MEDICINE

## 2023-11-15 PROCEDURE — 3079F DIAST BP 80-89 MM HG: CPT | Performed by: INTERNAL MEDICINE

## 2023-11-15 ASSESSMENT — ENCOUNTER SYMPTOMS
DECREASED APPETITE: 0
DEPRESSION: 0
FEVER: 0
COUGH: 0
SHORTNESS OF BREATH: 0
IRREGULAR HEARTBEAT: 0
ALTERED MENTAL STATUS: 0
BACK PAIN: 0
PALPITATIONS: 0
HEARTBURN: 0
ABDOMINAL PAIN: 0
WEIGHT GAIN: 0
FLANK PAIN: 0
WEIGHT LOSS: 0
BLURRED VISION: 0
VOMITING: 0
ORTHOPNEA: 0
DIZZINESS: 0
PND: 0
CLAUDICATION: 0
CONSTIPATION: 0
NEAR-SYNCOPE: 0
DYSPNEA ON EXERTION: 0
SYNCOPE: 0
NAUSEA: 0
DIARRHEA: 0

## 2023-11-15 ASSESSMENT — FIBROSIS 4 INDEX: FIB4 SCORE: 0.93

## 2023-11-15 NOTE — PROGRESS NOTES
Cardiology Note    Chief Complaint   Patient presents with    Hypertension    Hyperlipidemia    Coronary Artery Disease     F/v dx: Coronary artery disease involving native coronary artery of native heart without angina pectoris         History of Present Illness: Armen Bah is a 58 y.o. female PMH CAD / CABG 10/2022 (LIMA-LAD, rSVG-PL artery), HTN, HLD presents for follow up.     No cardiac complaints. Doing well. Compliant with medications and denies adverse effects.     Review of Systems   Constitutional: Negative for decreased appetite, fever, malaise/fatigue, weight gain and weight loss.   HENT:  Negative for congestion and nosebleeds.    Eyes:  Negative for blurred vision.   Cardiovascular:  Negative for chest pain, claudication, dyspnea on exertion, irregular heartbeat, leg swelling, near-syncope, orthopnea, palpitations, paroxysmal nocturnal dyspnea and syncope.   Respiratory:  Negative for cough and shortness of breath.    Endocrine: Negative for cold intolerance and heat intolerance.   Skin:  Negative for rash.   Musculoskeletal:  Negative for back pain.   Gastrointestinal:  Negative for abdominal pain, constipation, diarrhea, heartburn, melena, nausea and vomiting.   Genitourinary:  Negative for dysuria, flank pain and hematuria.   Neurological:  Negative for dizziness.   Psychiatric/Behavioral:  Negative for altered mental status and depression.          Past Medical History:   Diagnosis Date    Anesthesia     slow to wake up    Heart palpitations     Hypertension     Hypothyroid     Vitamin D deficiency          Past Surgical History:   Procedure Laterality Date    MULTIPLE CORONARY ARTERY BYPASS ENDO VEIN HARVEST  10/12/2022    Procedure: CORONARY ARTERY BYPASS GRAFTING X2, ENDOSCOPIC VEIN HARVEST;  Surgeon: Coretta Smyth M.D.;  Location: SURGERY [facility];  Service: Cardiac    BEVERYL  10/12/2022    Procedure: ECHOCARDIOGRAM, TRANSESOPHAGEAL;  Surgeon: Coretta Smyth M.D.;  Location: SURGERY  [facility];  Service: Cardiac    RECTOCELE REPAIR  3/15/2011    Performed by MARK RAMÍREZ at SURGERY SAME DAY [facility]    BIOPSY GENERAL  10/20/08    Performed by MARK RAMÍREZ at SURGERY SAME DAY [facility]    HYSTEROSCOPY WITH VIDEO OPERATIVE  10/20/08    Performed by MARK RAMÍREZ at SURGERY SAME DAY [facility]    GYN SURGERY          BRIANNE BY LAPAROSCOPY           Current Outpatient Medications   Medication Sig Dispense Refill    levothyroxine (SYNTHROID) 100 MCG Tab TAKE ONE TABLET BY MOUTH IN THE MORNING ON AN EMPTY STOMACH 90 Tablet 3    metoprolol SR (TOPROL XL) 100 MG TABLET SR 24 HR Take 1 Tablet by mouth every day. 90 Tablet 3    losartan-hydrochlorothiazide (HYZAAR) 100-25 MG per tablet Take 1 Tablet by mouth every day. 90 Tablet 3    rosuvastatin (CRESTOR) 20 MG Tab Take 1 Tablet by mouth at bedtime. 90 Tablet 3    clopidogrel (PLAVIX) 75 MG Tab Take 1 Tablet by mouth every day. 90 Tablet 3    spironolactone (ALDACTONE) 25 MG Tab Take 1 Tablet by mouth every day. 90 Tablet 3    acetaminophen (TYLENOL) 500 MG Tab Take 2 Tablets by mouth every 6 hours as needed for Moderate Pain. 30 Tablet 0     No current facility-administered medications for this visit.         Allergies   Allergen Reactions    Ace Inhibitors Cough    Atorvastatin Unspecified     Tolerating crestor         Family History   Problem Relation Age of Onset    Arthritis Mother     Hypertension Mother     Heart Disease Father 67        heart attack    Diabetes Father     Hypertension Sister          Social History     Socioeconomic History    Marital status:      Spouse name: Not on file    Number of children: Not on file    Years of education: Not on file    Highest education level: Not on file   Occupational History    Not on file   Tobacco Use    Smoking status: Never    Smokeless tobacco: Never   Vaping Use    Vaping Use: Never used   Substance and Sexual Activity    Alcohol use: Not Currently  "    Comment: occ    Drug use: No    Sexual activity: Yes     Partners: Male     Comment: vasectomy   Other Topics Concern    Not on file   Social History Narrative    Not on file     Social Determinants of Health     Financial Resource Strain: Not on file   Food Insecurity: Not on file   Transportation Needs: Not on file   Physical Activity: Not on file   Stress: Not on file   Social Connections: Not on file   Intimate Partner Violence: Not on file   Housing Stability: Not on file         Physical Exam:  Ambulatory Vitals  BP (!) 148/80 (BP Location: Left arm, Patient Position: Sitting, BP Cuff Size: Adult)   Pulse 65   Resp 14   Ht 1.676 m (5' 6\")   Wt 86.6 kg (191 lb)   SpO2 95%    BP Readings from Last 4 Encounters:   11/15/23 (!) 148/80   08/22/23 117/67   08/09/23 136/72   06/12/23 132/70     Weight/BMI:   Vitals:    11/15/23 0800   BP: (!) 148/80   Weight: 86.6 kg (191 lb)   Height: 1.676 m (5' 6\")    Body mass index is 30.83 kg/m².  Wt Readings from Last 4 Encounters:   11/15/23 86.6 kg (191 lb)   08/09/23 85.3 kg (188 lb)   06/12/23 86.2 kg (190 lb)   03/10/23 85.7 kg (189 lb)       Physical Exam  Constitutional:       General: She is not in acute distress.  HENT:      Head: Normocephalic and atraumatic.   Eyes:      Conjunctiva/sclera: Conjunctivae normal.      Pupils: Pupils are equal, round, and reactive to light.   Neck:      Vascular: No JVD.   Cardiovascular:      Rate and Rhythm: Normal rate and regular rhythm.      Heart sounds: Normal heart sounds. No murmur heard.     No friction rub. No gallop.   Pulmonary:      Effort: Pulmonary effort is normal. No respiratory distress.      Breath sounds: Normal breath sounds. No wheezing or rales.   Chest:      Chest wall: No tenderness.   Abdominal:      General: Bowel sounds are normal. There is no distension.      Palpations: Abdomen is soft.   Musculoskeletal:      Cervical back: Normal range of motion and neck supple.   Skin:     General: Skin is " "warm and dry.   Neurological:      Mental Status: She is alert and oriented to person, place, and time.   Psychiatric:         Mood and Affect: Affect normal.         Judgment: Judgment normal.         Lab Data Review:  Lab Results   Component Value Date/Time    CHOLSTRLTOT 110 11/30/2022 09:16 AM    LDL 58 11/30/2022 09:16 AM    HDL 35 (A) 11/30/2022 09:16 AM    TRIGLYCERIDE 87 11/30/2022 09:16 AM       Lab Results   Component Value Date/Time    SODIUM 138 03/13/2023 08:58 AM    POTASSIUM 4.3 03/13/2023 08:58 AM    CHLORIDE 105 03/13/2023 08:58 AM    CO2 23 03/13/2023 08:58 AM    GLUCOSE 101 (H) 03/13/2023 08:58 AM    BUN 20 03/13/2023 08:58 AM    CREATININE 0.96 03/13/2023 08:58 AM    CREATININE 0.72 01/21/2011 10:15 AM    BUNCREATRAT 19 07/06/2018 07:52 AM    BUNCREATRAT 15 01/21/2011 10:15 AM    GLOMRATE >59 01/21/2011 10:15 AM     CrCl cannot be calculated (Patient's most recent lab result is older than the maximum 7 days allowed.).  Lab Results   Component Value Date/Time    ALKPHOSPHAT 99 10/11/2022 08:32 PM    ASTSGOT 39 10/11/2022 08:32 PM    ALTSGPT 48 10/11/2022 08:32 PM    TBILIRUBIN 0.2 10/11/2022 08:32 PM      Lab Results   Component Value Date/Time    WBC 5.3 11/30/2022 09:16 AM    WBC 6.7 01/21/2011 10:15 AM     Lab Results   Component Value Date/Time    HBA1C 5.9 (H) 07/24/2023 10:22 AM     No components found for: \"TROP\"      Cardiac Imaging and Procedures Review:      Echo dated 6/9/2023, personally interpreted:   Hyperdynamic LV function with no WMA     Echo dated 10/11/2022, personally interpreted:   LVEF 60%, hypokinetic septum     [facility] (10/9/2022):   IMPRESSION:  Extensively diseased left anterior descending coronary from the ostium to mid segment of the vessel with a relatively healthy mid-distal segment.  Angiographically moderate ostial and moderate-severe distal bifurcation right coronary artery disease.  Minimal luminal irregularities in the left circumflex coronary artery " distribution.  CORONARY ANGIOGRAPHY:  The left main coronary artery is a short tapering vessel with distal 20% disease that bifurcates into the left anterior descending and left circumflex coronary arteries.  The left anterior descending coronary artery is a heavily diseased vessel with ostial to proximal 50% disease, a long segment of mid 80% disease after the takeoff of the first diagonal branch and involving the ostium of the second diagonal branch, a relatively healthy mid-distal segment, and then tapering distal luminal irregularities.  It supplies two small diffusely diseased proximal diagonal branches and a small-moderate third diagonal branch.  The left circumflex coronary artery is a large, nondominant vessel that supplies a large first obtuse marginal branch and a small second obtuse marginal branch and has luminal irregularities in the distribution.  The right coronary artery is a large, dominant vessel with ostial 50% disease, mid 30% disease, and a hazy distal bifurcation with ostial 60-70% disease in both a large posterior descending coronary and a large posterolateral branch.        Radiology test Review:  BL Carotid US 10/10/2022:   Mild bilateral internal carotid artery stenosis (<50%).     TTE 6/2023  CONCLUSIONS  Compared to the prior study on 10/12/22 - there is no longer apical   akinesis and reduced ejection fraction.  Hyperdynamic left ventricular systolic function.  The left ventricular ejection fraction is visually estimated to be   greater than 75%.  Abnormal septal motion consistent with postoperative status.  No LVOT obstruction.    Medical Decision Making:  Problem List Items Addressed This Visit       Essential hypertension    Mixed hyperlipidemia    Relevant Orders    Lipid Profile    SVT (supraventricular tachycardia)    S/P CABG x 2    NSTEMI (non-ST elevated myocardial infarction) (HCC)    Postoperative atrial fibrillation (HCC)    Coronary artery disease involving native coronary  artery of native heart without angina pectoris       CAD / CABG - asymptomatic. Continue BB, ARB. Continue statin.    HLD - threshold goal LDL <70. Continue statin. Annual lipids.     HTN - goal <130/80. Continue regimen. Better controlled at home. Return for in office BP check with home cuff to check accuracy.     No recurrence AF can use [employer] for rhythm checks at home if palpitations.     It was my pleasure to meet with Ms. Bah.

## 2023-11-22 ENCOUNTER — TELEPHONE (OUTPATIENT)
Dept: CARDIOLOGY | Facility: MEDICAL CENTER | Age: 58
End: 2023-11-22

## 2023-11-22 ENCOUNTER — NON-PROVIDER VISIT (OUTPATIENT)
Dept: CARDIOLOGY | Facility: MEDICAL CENTER | Age: 58
End: 2023-11-22
Attending: INTERNAL MEDICINE
Payer: COMMERCIAL

## 2023-11-22 VITALS
RESPIRATION RATE: 16 BRPM | BODY MASS INDEX: 31.05 KG/M2 | WEIGHT: 193.2 LBS | DIASTOLIC BLOOD PRESSURE: 61 MMHG | SYSTOLIC BLOOD PRESSURE: 126 MMHG | HEIGHT: 66 IN | OXYGEN SATURATION: 97 % | HEART RATE: 64 BPM

## 2023-11-22 ASSESSMENT — FIBROSIS 4 INDEX
FIB4 SCORE: 0.93

## 2023-11-22 NOTE — TELEPHONE ENCOUNTER
To VR: Patient in office for BP check and BP mostly within goal range. Readings were 112/62 and 120/72 with us taking it and 133/70 and 126/61 with home machine. Please advise if you have any additional recommendations for the patient.

## 2023-11-22 NOTE — PROGRESS NOTES
Patient was here today for blood pressure check per VR. BP readings located in vital sign section including patient's home BP cuff readings. Informed patient we will forward readings to nurse and they will receive a call with recommendations.

## 2023-11-27 NOTE — TELEPHONE ENCOUNTER
Guanako Menchaca M.D.  You11 hours ago (12:34 AM)     Sounds better. Continue medications. Thank you!     Phone number called: [phone]     Call outcome: Spoke to patient and updated her on VR's response. All questions/concerns answered at this time, patient appreciative of information given.

## 2023-12-08 ENCOUNTER — HOSPITAL ENCOUNTER (OUTPATIENT)
Dept: LAB | Facility: MEDICAL CENTER | Age: 58
End: 2023-12-08
Attending: INTERNAL MEDICINE
Payer: COMMERCIAL

## 2023-12-08 DIAGNOSIS — E78.2 MIXED HYPERLIPIDEMIA: ICD-10-CM

## 2023-12-08 LAB
CHOLEST SERPL-MCNC: 102 MG/DL (ref 100–199)
FASTING STATUS PATIENT QL REPORTED: NORMAL
HDLC SERPL-MCNC: 42 MG/DL
LDLC SERPL CALC-MCNC: 40 MG/DL
TRIGL SERPL-MCNC: 101 MG/DL (ref 0–149)

## 2023-12-08 PROCEDURE — 80061 LIPID PANEL: CPT

## 2023-12-08 PROCEDURE — 36415 COLL VENOUS BLD VENIPUNCTURE: CPT

## 2023-12-30 DIAGNOSIS — Z95.1 S/P CABG X 2: ICD-10-CM

## 2023-12-30 DIAGNOSIS — E78.2 MIXED HYPERLIPIDEMIA: ICD-10-CM

## 2023-12-30 DIAGNOSIS — I21.4 NSTEMI (NON-ST ELEVATED MYOCARDIAL INFARCTION) (HCC): ICD-10-CM

## 2024-01-02 RX ORDER — CLOPIDOGREL BISULFATE 75 MG/1
75 TABLET ORAL DAILY
Qty: 90 TABLET | Refills: 3 | Status: SHIPPED | OUTPATIENT
Start: 2024-01-02

## 2024-01-02 RX ORDER — ROSUVASTATIN CALCIUM 20 MG/1
20 TABLET, COATED ORAL
Qty: 90 TABLET | Refills: 3 | Status: SHIPPED | OUTPATIENT
Start: 2024-01-02

## 2024-01-02 NOTE — TELEPHONE ENCOUNTER
Is the patient due for a refill? Yes    Was the patient seen the past year? Yes    Date of last office visit: 11/15/23    Does the patient have an upcoming appointment?  Yes   If yes, When? 11/19/24    Provider to refill:VR    Does the patients insurance require a 100 day supply?  No

## 2024-02-09 ENCOUNTER — TELEPHONE (OUTPATIENT)
Dept: CARDIOLOGY | Facility: MEDICAL CENTER | Age: 59
End: 2024-02-09
Payer: COMMERCIAL

## 2024-02-09 DIAGNOSIS — I42.0 ISCHEMIC DILATED CARDIOMYOPATHY (HCC): ICD-10-CM

## 2024-02-09 DIAGNOSIS — I25.5 ISCHEMIC DILATED CARDIOMYOPATHY (HCC): ICD-10-CM

## 2024-02-09 DIAGNOSIS — I50.9 HEART FAILURE, UNSPECIFIED HF CHRONICITY, UNSPECIFIED HEART FAILURE TYPE (HCC): ICD-10-CM

## 2024-02-13 RX ORDER — SPIRONOLACTONE 25 MG/1
25 TABLET ORAL DAILY
Qty: 90 TABLET | Refills: 0 | Status: SHIPPED | OUTPATIENT
Start: 2024-02-13 | End: 2024-02-17 | Stop reason: SDUPTHER

## 2024-02-15 NOTE — TELEPHONE ENCOUNTER
VR    Caller: Armen Bah     Topic/issue: Pt is out of her Spironolactone and is asking for a refill to be sent to [facility]. She will go in tomorrow morning to have her labs done.     Callback Number: [phone] (home)       Thank you    Yohana WEBER

## 2024-02-16 ENCOUNTER — HOSPITAL ENCOUNTER (OUTPATIENT)
Dept: LAB | Facility: MEDICAL CENTER | Age: 59
End: 2024-02-16
Attending: PHYSICIAN ASSISTANT
Payer: COMMERCIAL

## 2024-02-16 DIAGNOSIS — I50.9 HEART FAILURE, UNSPECIFIED HF CHRONICITY, UNSPECIFIED HEART FAILURE TYPE (HCC): ICD-10-CM

## 2024-02-16 DIAGNOSIS — I42.0 ISCHEMIC DILATED CARDIOMYOPATHY (HCC): ICD-10-CM

## 2024-02-16 DIAGNOSIS — I25.5 ISCHEMIC DILATED CARDIOMYOPATHY (HCC): ICD-10-CM

## 2024-02-16 LAB
ANION GAP SERPL CALC-SCNC: 11 MMOL/L (ref 7–16)
BUN SERPL-MCNC: 17 MG/DL (ref 8–22)
CALCIUM SERPL-MCNC: 10.2 MG/DL (ref 8.5–10.5)
CHLORIDE SERPL-SCNC: 104 MMOL/L (ref 96–112)
CO2 SERPL-SCNC: 25 MMOL/L (ref 20–33)
CREAT SERPL-MCNC: 0.8 MG/DL (ref 0.5–1.4)
GFR SERPLBLD CREATININE-BSD FMLA CKD-EPI: 86 ML/MIN/1.73 M 2
GLUCOSE SERPL-MCNC: 94 MG/DL (ref 65–99)
MAGNESIUM SERPL-MCNC: 2 MG/DL (ref 1.5–2.5)
POTASSIUM SERPL-SCNC: 4.5 MMOL/L (ref 3.6–5.5)
SODIUM SERPL-SCNC: 140 MMOL/L (ref 135–145)

## 2024-02-16 PROCEDURE — 83735 ASSAY OF MAGNESIUM: CPT

## 2024-02-16 PROCEDURE — 36415 COLL VENOUS BLD VENIPUNCTURE: CPT

## 2024-02-16 PROCEDURE — 80048 BASIC METABOLIC PNL TOTAL CA: CPT

## 2024-02-17 DIAGNOSIS — I50.9 HEART FAILURE, UNSPECIFIED HF CHRONICITY, UNSPECIFIED HEART FAILURE TYPE (HCC): ICD-10-CM

## 2024-02-17 DIAGNOSIS — I42.0 ISCHEMIC DILATED CARDIOMYOPATHY (HCC): ICD-10-CM

## 2024-02-17 DIAGNOSIS — I25.5 ISCHEMIC DILATED CARDIOMYOPATHY (HCC): ICD-10-CM

## 2024-02-17 RX ORDER — SPIRONOLACTONE 25 MG/1
25 TABLET ORAL DAILY
Qty: 90 TABLET | Refills: 3 | Status: SHIPPED | OUTPATIENT
Start: 2024-02-17

## 2024-03-22 DIAGNOSIS — I10 ESSENTIAL HYPERTENSION: ICD-10-CM

## 2024-03-22 RX ORDER — LOSARTAN POTASSIUM AND HYDROCHLOROTHIAZIDE 25; 100 MG/1; MG/1
1 TABLET ORAL DAILY
Qty: 90 TABLET | Refills: 2 | Status: SHIPPED | OUTPATIENT
Start: 2024-03-22

## 2024-04-28 DIAGNOSIS — I10 ESSENTIAL HYPERTENSION: ICD-10-CM

## 2024-04-29 RX ORDER — METOPROLOL SUCCINATE 100 MG/1
100 TABLET, EXTENDED RELEASE ORAL DAILY
Qty: 90 TABLET | Refills: 3 | Status: SHIPPED | OUTPATIENT
Start: 2024-04-29

## 2024-04-29 NOTE — TELEPHONE ENCOUNTER
Is the patient due for a refill? Yes    Was the patient seen the past year? Yes    Date of last office visit: 11/15/2023    Does the patient have an upcoming appointment?  Yes   If yes, When? 11/19/2024    Provider to refill:VR    Does the patients insurance require a 100 day supply?  No

## 2024-06-20 NOTE — PROGRESS NOTES
Chief Complaint   Patient presents with    Other     F/V Dx: Hypertension          Subjective:   Armen Bah is a 58 y.o. female who presents today for follow-up.     Patient of Dr. Menchaca.  Current medical problems include hypertension, hyperlipidemia and CCS of 0 in . In 2022 she had NSTEMI found to have multivessel disease on coronary angiogram. She was referred for bypass surgery due to her elevated glucose readings and young age.     Underwent two-vessel coronary artery bypass (LIMA-LAD, reverse SVG to posterolateral artery) by Dr. Smyth 10/18/2022.  Postoperatively she had atrial fibrillation RVR  for about 30 min which was treated with IV amiodarone.    Last visit with Dr. Menchaca in 2023.     Since her visit she experienced the sudden death of her brother, also a cardiac patient. She found him in the bedroom and has been having recurrent visions of him and sometimes is scared to go to bed at night for fear she won't wake up. She admits to poor sleep. She is always tired during the day, she could always take a nap.     She is experiencing increased shortness of breath with exertion such as climbing the stairs to her bedroom in their apartment. No chest pressure.     Blood pressures have been inexplicably high in the last few weeks despite taking all her meds as directed. Sometimes she feels her heart pounding but she will check her pulse and her pulse is slow and regular, usually her blood pressure is high at this point.      Her father had coronary artery disease with several heart attacks starting in his 60s or 70s.  He ultimately  from complications of a stroke.  No other family history of coronary artery disease including 5 of her 6 siblings. Her brother  in his sleep at age 63 from possible heart attack.            Past Medical History:   Diagnosis Date    Anesthesia     slow to wake up    Heart palpitations     Hypertension     Hypothyroid     Vitamin D deficiency   "        Family History   Problem Relation Age of Onset    Arthritis Mother     Hypertension Mother     Heart Disease Father 67        heart attack    Diabetes Father     Hypertension Sister          Social History     Tobacco Use    Smoking status: Never    Smokeless tobacco: Never   Vaping Use    Vaping status: Never Used   Substance Use Topics    Alcohol use: Not Currently     Comment: occ    Drug use: No         Allergies   Allergen Reactions    Ace Inhibitors Cough    Atorvastatin Unspecified     Tolerating crestor         Current Outpatient Medications   Medication Sig    amLODIPine (NORVASC) 2.5 MG Tab Take 1 Tablet by mouth every day.    metoprolol SR (TOPROL XL) 100 MG TABLET SR 24 HR TAKE ONE TABLET BY MOUTH ONE TIME DAILY    losartan-hydrochlorothiazide (HYZAAR) 100-25 MG per tablet TAKE ONE TABLET BY MOUTH ONE TIME DAILY    spironolactone (ALDACTONE) 25 MG Tab Take 1 Tablet by mouth every day.    clopidogrel (PLAVIX) 75 MG Tab TAKE ONE TABLET BY MOUTH ONE TIME DAILY    rosuvastatin (CRESTOR) 20 MG Tab TAKE ONE TABLET BY MOUTH AT BEDTIME    levothyroxine (SYNTHROID) 100 MCG Tab TAKE ONE TABLET BY MOUTH IN THE MORNING ON AN EMPTY STOMACH    acetaminophen (TYLENOL) 500 MG Tab Take 2 Tablets by mouth every 6 hours as needed for Moderate Pain.         Review of Systems   Constitutional:  Positive for malaise/fatigue.   Respiratory:  Positive for shortness of breath. Negative for cough.    Cardiovascular:  Positive for palpitations. Negative for chest pain and leg swelling.   Gastrointestinal:  Negative for blood in stool and melena.   Neurological:  Negative for dizziness.   Psychiatric/Behavioral:  Positive for depression. The patient is nervous/anxious.           Objective:   BP (!) 140/72 (BP Location: Left arm, Patient Position: Sitting, BP Cuff Size: Adult)   Pulse 65   Resp 15   Ht 1.676 m (5' 6\")   Wt 84.8 kg (187 lb)   SpO2 95%  Body mass index is 30.18 kg/m².         Physical Exam  Vitals " reviewed.   Constitutional:       Comments: tearful   HENT:      Head: Normocephalic and atraumatic.   Cardiovascular:      Rate and Rhythm: Normal rate and regular rhythm.      Heart sounds: No murmur heard.  Pulmonary:      Effort: Pulmonary effort is normal. No respiratory distress.      Breath sounds: No rales.   Musculoskeletal:      Right lower leg: No edema.      Left lower leg: No edema.   Skin:     General: Skin is warm and dry.   Neurological:      General: No focal deficit present.      Mental Status: She is alert.      Gait: Gait normal.   Psychiatric:         Judgment: Judgment normal.            Assessment:     1. NSTEMI (non-ST elevated myocardial infarction) ([facility], [facility])  Lipid Profile      2. Hypothyroidism, unspecified type  TSH WITH REFLEX TO FT4    CBC WITHOUT DIFFERENTIAL      3. S/P CABG x 2  Lipid Profile      4. Dyspnea on exertion  TSH WITH REFLEX TO FT4    EC-ECHOCARDIOGRAM COMPLETE W/O CONT      5. Anxiety        6. Ischemic dilated cardiomyopathy (HCC)  EC-ECHOCARDIOGRAM COMPLETE W/O CONT    Comp Metabolic Panel    CBC WITHOUT DIFFERENTIAL      7. Daytime somnolence  OVERNIGHT PULSE OXIMETRY    Referral to Pulmonary and Sleep Medicine      8. Primary hypertension  amLODIPine (NORVASC) 2.5 MG Tab           Medical Decision Making:  Today's Assessment / Plan:   Anxiety/Depression related to traumatic event, new  - discovered her  brother, is experiencing PTSD like symptoms. She has contact for a therapist which she will use. Not experiencing HI/SI  - likely sleep disturbance related to above. Also check OPO, sleep med consult for narcolepsy symptoms    NSTEMI   Multivessel CAD  Hyperlipidemia  Family history of CAD and sudden death  - S.P CABG x2 (LIMA-LAD, rSVG to PL branch)   -Continue Plavix  -Continue beta-blocker therapy  -Continue high intensity statin.  LDL under control, repeat this year       Ischemic Cardiomyopathy, recovered EF  -She is experiencing new dyspnea with  exertion.  Will repeat a echocardiogram and consider stress testing if abnormal.  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 LVH secondary to hypertensive disease     Hypertension, poorly controlled  - add amlodipine 2.5mg     Hypothyroid  - check thyroid labs given above symptoms    Echo, lab testing then follow up.     Future Appointments   Date Time Provider Department Center   7/8/2024  4:00 PM [facility]   7/15/2024  3:45 PM DEE Boyd None   11/19/2024  7:40 AM LUCY Zayas None     No follow-ups on file.  Sooner if problems.

## 2024-06-21 ENCOUNTER — OFFICE VISIT (OUTPATIENT)
Dept: CARDIOLOGY | Facility: MEDICAL CENTER | Age: 59
End: 2024-06-21
Attending: PHYSICIAN ASSISTANT
Payer: COMMERCIAL

## 2024-06-21 VITALS
WEIGHT: 187 LBS | BODY MASS INDEX: 30.05 KG/M2 | DIASTOLIC BLOOD PRESSURE: 72 MMHG | RESPIRATION RATE: 15 BRPM | OXYGEN SATURATION: 95 % | SYSTOLIC BLOOD PRESSURE: 122 MMHG | HEIGHT: 66 IN | HEART RATE: 65 BPM

## 2024-06-21 DIAGNOSIS — Z95.1 S/P CABG X 2: ICD-10-CM

## 2024-06-21 DIAGNOSIS — E03.9 HYPOTHYROIDISM, UNSPECIFIED TYPE: ICD-10-CM

## 2024-06-21 DIAGNOSIS — I42.0 ISCHEMIC DILATED CARDIOMYOPATHY (HCC): ICD-10-CM

## 2024-06-21 DIAGNOSIS — I21.4 NSTEMI (NON-ST ELEVATED MYOCARDIAL INFARCTION) (HCC): ICD-10-CM

## 2024-06-21 DIAGNOSIS — R06.09 DYSPNEA ON EXERTION: ICD-10-CM

## 2024-06-21 DIAGNOSIS — I25.5 ISCHEMIC DILATED CARDIOMYOPATHY (HCC): ICD-10-CM

## 2024-06-21 DIAGNOSIS — R40.0 DAYTIME SOMNOLENCE: ICD-10-CM

## 2024-06-21 DIAGNOSIS — F41.9 ANXIETY: ICD-10-CM

## 2024-06-21 DIAGNOSIS — I10 PRIMARY HYPERTENSION: ICD-10-CM

## 2024-06-21 PROCEDURE — 99212 OFFICE O/P EST SF 10 MIN: CPT | Performed by: PHYSICIAN ASSISTANT

## 2024-06-21 PROCEDURE — 3077F SYST BP >= 140 MM HG: CPT | Performed by: PHYSICIAN ASSISTANT

## 2024-06-21 PROCEDURE — 99214 OFFICE O/P EST MOD 30 MIN: CPT | Performed by: PHYSICIAN ASSISTANT

## 2024-06-21 PROCEDURE — 3078F DIAST BP <80 MM HG: CPT | Performed by: PHYSICIAN ASSISTANT

## 2024-06-21 RX ORDER — AMLODIPINE BESYLATE 2.5 MG/1
2.5 TABLET ORAL DAILY
Qty: 90 TABLET | Refills: 3 | Status: SHIPPED | OUTPATIENT
Start: 2024-06-21

## 2024-06-21 ASSESSMENT — ENCOUNTER SYMPTOMS
COUGH: 0
PALPITATIONS: 1
BLOOD IN STOOL: 0
NERVOUS/ANXIOUS: 1
DIZZINESS: 0
DEPRESSION: 1
SHORTNESS OF BREATH: 1

## 2024-06-21 ASSESSMENT — FIBROSIS 4 INDEX: FIB4 SCORE: 0.93

## 2024-07-03 ENCOUNTER — HOSPITAL ENCOUNTER (OUTPATIENT)
Dept: LAB | Facility: MEDICAL CENTER | Age: 59
End: 2024-07-03
Attending: PHYSICIAN ASSISTANT
Payer: COMMERCIAL

## 2024-07-03 DIAGNOSIS — I42.0 ISCHEMIC DILATED CARDIOMYOPATHY (HCC): ICD-10-CM

## 2024-07-03 DIAGNOSIS — I25.5 ISCHEMIC DILATED CARDIOMYOPATHY (HCC): ICD-10-CM

## 2024-07-03 DIAGNOSIS — R06.09 DYSPNEA ON EXERTION: ICD-10-CM

## 2024-07-03 DIAGNOSIS — I21.4 NSTEMI (NON-ST ELEVATED MYOCARDIAL INFARCTION) (HCC): ICD-10-CM

## 2024-07-03 DIAGNOSIS — E03.9 HYPOTHYROIDISM, UNSPECIFIED TYPE: ICD-10-CM

## 2024-07-03 DIAGNOSIS — Z95.1 S/P CABG X 2: ICD-10-CM

## 2024-07-03 LAB
ALBUMIN SERPL BCP-MCNC: 4.3 G/DL (ref 3.2–4.9)
ALBUMIN/GLOB SERPL: 1.7 G/DL
ALP SERPL-CCNC: 108 U/L (ref 30–99)
ALT SERPL-CCNC: 31 U/L (ref 2–50)
ANION GAP SERPL CALC-SCNC: 9 MMOL/L (ref 7–16)
AST SERPL-CCNC: 16 U/L (ref 12–45)
BILIRUB SERPL-MCNC: 0.4 MG/DL (ref 0.1–1.5)
BUN SERPL-MCNC: 14 MG/DL (ref 8–22)
CALCIUM ALBUM COR SERPL-MCNC: 9.9 MG/DL (ref 8.5–10.5)
CALCIUM SERPL-MCNC: 10.1 MG/DL (ref 8.5–10.5)
CHLORIDE SERPL-SCNC: 106 MMOL/L (ref 96–112)
CHOLEST SERPL-MCNC: 112 MG/DL (ref 100–199)
CO2 SERPL-SCNC: 25 MMOL/L (ref 20–33)
CREAT SERPL-MCNC: 0.82 MG/DL (ref 0.5–1.4)
ERYTHROCYTE [DISTWIDTH] IN BLOOD BY AUTOMATED COUNT: 43.7 FL (ref 35.9–50)
FASTING STATUS PATIENT QL REPORTED: NORMAL
GFR SERPLBLD CREATININE-BSD FMLA CKD-EPI: 83 ML/MIN/1.73 M 2
GLOBULIN SER CALC-MCNC: 2.6 G/DL (ref 1.9–3.5)
GLUCOSE SERPL-MCNC: 102 MG/DL (ref 65–99)
HCT VFR BLD AUTO: 41.8 % (ref 37–47)
HDLC SERPL-MCNC: 41 MG/DL
HGB BLD-MCNC: 14.4 G/DL (ref 12–16)
LDLC SERPL CALC-MCNC: 53 MG/DL
MCH RBC QN AUTO: 31 PG (ref 27–33)
MCHC RBC AUTO-ENTMCNC: 34.4 G/DL (ref 32.2–35.5)
MCV RBC AUTO: 89.9 FL (ref 81.4–97.8)
PLATELET # BLD AUTO: 283 K/UL (ref 164–446)
PMV BLD AUTO: 10.5 FL (ref 9–12.9)
POTASSIUM SERPL-SCNC: 3.9 MMOL/L (ref 3.6–5.5)
PROT SERPL-MCNC: 6.9 G/DL (ref 6–8.2)
RBC # BLD AUTO: 4.65 M/UL (ref 4.2–5.4)
SODIUM SERPL-SCNC: 140 MMOL/L (ref 135–145)
TRIGL SERPL-MCNC: 92 MG/DL (ref 0–149)
TSH SERPL DL<=0.005 MIU/L-ACNC: 0.93 UIU/ML (ref 0.38–5.33)
WBC # BLD AUTO: 6.8 K/UL (ref 4.8–10.8)

## 2024-07-03 PROCEDURE — 80053 COMPREHEN METABOLIC PANEL: CPT

## 2024-07-03 PROCEDURE — 84443 ASSAY THYROID STIM HORMONE: CPT

## 2024-07-03 PROCEDURE — 80061 LIPID PANEL: CPT

## 2024-07-03 PROCEDURE — 36415 COLL VENOUS BLD VENIPUNCTURE: CPT

## 2024-07-03 PROCEDURE — 85027 COMPLETE CBC AUTOMATED: CPT

## 2024-07-08 ENCOUNTER — ANCILLARY PROCEDURE (OUTPATIENT)
Dept: CARDIOLOGY | Facility: MEDICAL CENTER | Age: 59
End: 2024-07-08
Attending: PHYSICIAN ASSISTANT
Payer: COMMERCIAL

## 2024-07-08 DIAGNOSIS — I42.0 ISCHEMIC DILATED CARDIOMYOPATHY (HCC): ICD-10-CM

## 2024-07-08 DIAGNOSIS — R06.09 DYSPNEA ON EXERTION: ICD-10-CM

## 2024-07-08 DIAGNOSIS — I25.5 ISCHEMIC DILATED CARDIOMYOPATHY (HCC): ICD-10-CM

## 2024-07-08 PROCEDURE — 93306 TTE W/DOPPLER COMPLETE: CPT

## 2024-07-09 LAB
LV EJECT FRACT MOD 2C 99903: 74.35
LV EJECT FRACT MOD 4C 99902: 79.42
LV EJECT FRACT MOD BP 99901: 76.97

## 2024-07-09 PROCEDURE — 93306 TTE W/DOPPLER COMPLETE: CPT | Mod: 26 | Performed by: STUDENT IN AN ORGANIZED HEALTH CARE EDUCATION/TRAINING PROGRAM

## 2024-07-15 ENCOUNTER — OFFICE VISIT (OUTPATIENT)
Dept: CARDIOLOGY | Facility: MEDICAL CENTER | Age: 59
End: 2024-07-15
Attending: PHYSICIAN ASSISTANT
Payer: COMMERCIAL

## 2024-07-15 VITALS
OXYGEN SATURATION: 97 % | RESPIRATION RATE: 16 BRPM | HEART RATE: 70 BPM | BODY MASS INDEX: 30.01 KG/M2 | SYSTOLIC BLOOD PRESSURE: 122 MMHG | WEIGHT: 186.7 LBS | HEIGHT: 66 IN | DIASTOLIC BLOOD PRESSURE: 62 MMHG

## 2024-07-15 DIAGNOSIS — Z95.1 S/P CABG X 2: ICD-10-CM

## 2024-07-15 DIAGNOSIS — I42.0 ISCHEMIC DILATED CARDIOMYOPATHY (HCC): ICD-10-CM

## 2024-07-15 DIAGNOSIS — E78.2 MIXED HYPERLIPIDEMIA: ICD-10-CM

## 2024-07-15 DIAGNOSIS — F41.9 ANXIETY: ICD-10-CM

## 2024-07-15 DIAGNOSIS — I10 PRIMARY HYPERTENSION: ICD-10-CM

## 2024-07-15 DIAGNOSIS — I10 ESSENTIAL HYPERTENSION: ICD-10-CM

## 2024-07-15 DIAGNOSIS — R40.0 DAYTIME SOMNOLENCE: ICD-10-CM

## 2024-07-15 DIAGNOSIS — I25.5 ISCHEMIC DILATED CARDIOMYOPATHY (HCC): ICD-10-CM

## 2024-07-15 DIAGNOSIS — I25.10 CORONARY ARTERY DISEASE INVOLVING NATIVE CORONARY ARTERY OF NATIVE HEART WITHOUT ANGINA PECTORIS: ICD-10-CM

## 2024-07-15 PROCEDURE — 99213 OFFICE O/P EST LOW 20 MIN: CPT | Performed by: PHYSICIAN ASSISTANT

## 2024-07-15 PROCEDURE — 3078F DIAST BP <80 MM HG: CPT | Performed by: PHYSICIAN ASSISTANT

## 2024-07-15 PROCEDURE — 3074F SYST BP LT 130 MM HG: CPT | Performed by: PHYSICIAN ASSISTANT

## 2024-07-15 PROCEDURE — 99212 OFFICE O/P EST SF 10 MIN: CPT | Performed by: PHYSICIAN ASSISTANT

## 2024-07-15 ASSESSMENT — ENCOUNTER SYMPTOMS
NERVOUS/ANXIOUS: 1
SHORTNESS OF BREATH: 1
PND: 0
ORTHOPNEA: 0
PALPITATIONS: 0
DIZZINESS: 0

## 2024-07-15 ASSESSMENT — FIBROSIS 4 INDEX: FIB4 SCORE: 0.58

## 2024-08-25 DIAGNOSIS — E03.9 HYPOTHYROIDISM, UNSPECIFIED TYPE: ICD-10-CM

## 2024-08-26 RX ORDER — LEVOTHYROXINE SODIUM 100 UG/1
100 TABLET ORAL
Qty: 90 TABLET | Refills: 0 | Status: SHIPPED | OUTPATIENT
Start: 2024-08-26

## 2024-08-26 NOTE — TELEPHONE ENCOUNTER
Received request via: Pharmacy    Was the patient seen in the last year in this department? No. Last seen 06/12/2023    Does the patient have an active prescription (recently filled or refills available) for medication(s) requested? No    Pharmacy Name: [facility]    Does the patient have shelter Plus and need 100-day supply? (This applies to ALL medications) Patient does not have SCP

## 2024-11-19 ENCOUNTER — APPOINTMENT (OUTPATIENT)
Dept: CARDIOLOGY | Facility: MEDICAL CENTER | Age: 59
End: 2024-11-19
Attending: INTERNAL MEDICINE
Payer: COMMERCIAL

## 2024-11-21 DIAGNOSIS — E03.9 HYPOTHYROIDISM, UNSPECIFIED TYPE: ICD-10-CM

## 2024-11-21 NOTE — TELEPHONE ENCOUNTER
Received request via: Pharmacy    Was the patient seen in the last year in this department? No    Does the patient have an active prescription (recently filled or refills available) for medication(s) requested? No    Pharmacy Name:   [id number] - [place] - [address] [facility]  [address] [place] [address]  [place] [address]  Phone: [phone] Fax: [phone]       Does the patient have nursing home Plus and need 100-day supply? (This applies to ALL medications) Patient does not have SCP

## 2024-11-22 RX ORDER — LEVOTHYROXINE SODIUM 100 UG/1
100 TABLET ORAL
Qty: 90 TABLET | Refills: 0 | Status: SHIPPED | OUTPATIENT
Start: 2024-11-22

## 2024-12-03 ASSESSMENT — ENCOUNTER SYMPTOMS: SLEEP DISTURBANCE: 0

## 2024-12-04 ENCOUNTER — APPOINTMENT (OUTPATIENT)
Dept: SLEEP MEDICINE | Facility: MEDICAL CENTER | Age: 59
End: 2024-12-04
Attending: PHYSICIAN ASSISTANT
Payer: COMMERCIAL

## 2024-12-11 ENCOUNTER — HOSPITAL ENCOUNTER (OUTPATIENT)
Dept: RADIOLOGY | Facility: MEDICAL CENTER | Age: 59
End: 2024-12-11
Attending: REGISTERED NURSE
Payer: COMMERCIAL

## 2024-12-11 DIAGNOSIS — Z12.31 VISIT FOR SCREENING MAMMOGRAM: ICD-10-CM

## 2024-12-11 PROCEDURE — 77067 SCR MAMMO BI INCL CAD: CPT

## 2024-12-13 DIAGNOSIS — I10 ESSENTIAL HYPERTENSION: ICD-10-CM

## 2024-12-13 RX ORDER — LOSARTAN POTASSIUM AND HYDROCHLOROTHIAZIDE 25; 100 MG/1; MG/1
1 TABLET ORAL DAILY
Qty: 90 TABLET | Refills: 2 | Status: SHIPPED | OUTPATIENT
Start: 2024-12-13

## 2024-12-28 DIAGNOSIS — I21.4 NSTEMI (NON-ST ELEVATED MYOCARDIAL INFARCTION) (HCC): ICD-10-CM

## 2024-12-28 DIAGNOSIS — E78.2 MIXED HYPERLIPIDEMIA: ICD-10-CM

## 2024-12-28 DIAGNOSIS — Z95.1 S/P CABG X 2: ICD-10-CM

## 2024-12-31 RX ORDER — CLOPIDOGREL BISULFATE 75 MG/1
75 TABLET ORAL DAILY
Qty: 90 TABLET | Refills: 2 | Status: SHIPPED | OUTPATIENT
Start: 2024-12-31

## 2024-12-31 RX ORDER — ROSUVASTATIN CALCIUM 20 MG/1
20 TABLET, COATED ORAL
Qty: 90 TABLET | Refills: 2 | Status: SHIPPED | OUTPATIENT
Start: 2024-12-31

## 2024-12-31 NOTE — TELEPHONE ENCOUNTER
Is the patient due for a refill? Yes    Was the patient seen the past year? Yes    Date of last office visit: 7/15/24    Does the patient have an upcoming appointment?  No   If yes, When?     Provider to refill:KM    Does the patient have [facility] Plus and need 100-day supply? (This applies to ALL medications) Patient does not have SCP

## 2025-02-13 ENCOUNTER — TELEPHONE (OUTPATIENT)
Dept: CARDIOLOGY | Facility: MEDICAL CENTER | Age: 60
End: 2025-02-13
Payer: COMMERCIAL

## 2025-02-13 DIAGNOSIS — I42.0 ISCHEMIC DILATED CARDIOMYOPATHY (HCC): ICD-10-CM

## 2025-02-13 DIAGNOSIS — I50.9 HEART FAILURE, UNSPECIFIED HF CHRONICITY, UNSPECIFIED HEART FAILURE TYPE (HCC): ICD-10-CM

## 2025-02-13 DIAGNOSIS — I25.5 ISCHEMIC DILATED CARDIOMYOPATHY (HCC): ICD-10-CM

## 2025-02-13 RX ORDER — SPIRONOLACTONE 25 MG/1
25 TABLET ORAL DAILY
Qty: 90 TABLET | Refills: 1 | Status: SHIPPED | OUTPATIENT
Start: 2025-02-13

## 2025-02-13 NOTE — TELEPHONE ENCOUNTER
KM    Received request via: Patient    Was the patient seen in the last year in this department? Yes    Does the patient have an active prescription (recently filled or refills available) for medication(s) requested? No    Pharmacy Name:     [facility] - [place] - [address] [facility] [[id number]]     Does the patient have halfway Plus and need 100-day supply? (This applies to ALL medications) Patient does not have SCP

## 2025-02-16 DIAGNOSIS — E03.9 HYPOTHYROIDISM, UNSPECIFIED TYPE: ICD-10-CM

## 2025-02-18 RX ORDER — LEVOTHYROXINE SODIUM 100 UG/1
100 TABLET ORAL
Qty: 90 TABLET | Refills: 0 | Status: SHIPPED | OUTPATIENT
Start: 2025-02-18

## 2025-02-18 NOTE — TELEPHONE ENCOUNTER
Received request via: Pharmacy    Was the patient seen in the last year in this department? No. Last seen 06/12/2023    Does the patient have an active prescription (recently filled or refills available) for medication(s) requested? No    Pharmacy Name: [facility]    Does the patient have long-term Plus and need 100-day supply? (This applies to ALL medications) Patient does not have SCP

## 2025-04-16 DIAGNOSIS — I10 ESSENTIAL HYPERTENSION: ICD-10-CM

## 2025-04-16 RX ORDER — METOPROLOL SUCCINATE 100 MG/1
100 TABLET, EXTENDED RELEASE ORAL DAILY
Qty: 90 TABLET | Refills: 0 | Status: SHIPPED | OUTPATIENT
Start: 2025-04-16

## 2025-04-16 NOTE — TELEPHONE ENCOUNTER
Is the patient due for a refill? Yes    Was the patient seen the last 15 months? Yes    Date of last office visit: 7/15/2024    Does the patient have an upcoming appointment?  No      Provider to refill:KM    Does the patient have [facility] Plus and need 100-day supply? (This applies to ALL medications) Patient does not have SCP

## 2025-04-17 RX ORDER — METOPROLOL SUCCINATE 100 MG/1
100 TABLET, EXTENDED RELEASE ORAL DAILY
Qty: 90 TABLET | Refills: 0 | OUTPATIENT
Start: 2025-04-17

## 2025-05-27 ENCOUNTER — OFFICE VISIT (OUTPATIENT)
Dept: MEDICAL GROUP | Facility: LAB | Age: 60
End: 2025-05-27
Payer: COMMERCIAL

## 2025-05-27 VITALS
HEART RATE: 96 BPM | HEIGHT: 66 IN | TEMPERATURE: 97.4 F | DIASTOLIC BLOOD PRESSURE: 66 MMHG | SYSTOLIC BLOOD PRESSURE: 124 MMHG | WEIGHT: 204.81 LBS | BODY MASS INDEX: 32.92 KG/M2 | OXYGEN SATURATION: 66 % | RESPIRATION RATE: 18 BRPM

## 2025-05-27 DIAGNOSIS — Z13.21 ENCOUNTER FOR VITAMIN DEFICIENCY SCREENING: ICD-10-CM

## 2025-05-27 DIAGNOSIS — Z11.59 NEED FOR HEPATITIS C SCREENING TEST: ICD-10-CM

## 2025-05-27 DIAGNOSIS — I10 ESSENTIAL HYPERTENSION: ICD-10-CM

## 2025-05-27 DIAGNOSIS — Z23 NEED FOR VACCINATION: ICD-10-CM

## 2025-05-27 DIAGNOSIS — R53.83 OTHER FATIGUE: ICD-10-CM

## 2025-05-27 DIAGNOSIS — Z13.220 LIPID SCREENING: ICD-10-CM

## 2025-05-27 DIAGNOSIS — E03.9 HYPOTHYROIDISM, UNSPECIFIED TYPE: Primary | ICD-10-CM

## 2025-05-27 DIAGNOSIS — R73.03 PREDIABETES: ICD-10-CM

## 2025-05-27 PROCEDURE — 3074F SYST BP LT 130 MM HG: CPT | Performed by: PHYSICIAN ASSISTANT

## 2025-05-27 PROCEDURE — 3078F DIAST BP <80 MM HG: CPT | Performed by: PHYSICIAN ASSISTANT

## 2025-05-27 PROCEDURE — 90715 TDAP VACCINE 7 YRS/> IM: CPT | Performed by: PHYSICIAN ASSISTANT

## 2025-05-27 PROCEDURE — 90677 PCV20 VACCINE IM: CPT | Performed by: PHYSICIAN ASSISTANT

## 2025-05-27 PROCEDURE — 90471 IMMUNIZATION ADMIN: CPT | Performed by: PHYSICIAN ASSISTANT

## 2025-05-27 PROCEDURE — 99214 OFFICE O/P EST MOD 30 MIN: CPT | Mod: 25 | Performed by: PHYSICIAN ASSISTANT

## 2025-05-27 PROCEDURE — 90472 IMMUNIZATION ADMIN EACH ADD: CPT | Performed by: PHYSICIAN ASSISTANT

## 2025-05-27 RX ORDER — LEVOTHYROXINE SODIUM 100 UG/1
100 TABLET ORAL
Qty: 90 TABLET | Refills: 3 | Status: SHIPPED | OUTPATIENT
Start: 2025-05-27

## 2025-05-27 ASSESSMENT — PATIENT HEALTH QUESTIONNAIRE - PHQ9: CLINICAL INTERPRETATION OF PHQ2 SCORE: 0

## 2025-05-27 ASSESSMENT — FIBROSIS 4 INDEX: FIB4 SCORE: 0.59

## 2025-05-27 NOTE — PROGRESS NOTES
Subjective:     CC: follow up, med refill    HPI:   Armen here today with   Verbal consent was acquired by the patient to use [employer] ambient listening note generation during this visit Yes     History of Present Illness  The patient presents for medication refills and reports persistent fatigue.    Fatigue  - They have been experiencing consistent fatigue  - They have not followed up with a sleep specialist due to financial constraints, although they believe the consultation was meant to evaluate them for sleep apnea.  - They find it difficult to stay awake during sedentary activities  - Living alone, they are uncertain about snoring, but their son has noted light snoring.  - They experience daytime fatigue and have been informed of episodes of gasping or cessation of breathing during sleep.    STOPBANG - Sleep Apnea Screening      Flowsheet Row Most Recent Value   S - Have you been told that you SNORE? Yes   T - Are you often TIRED during the day? Yes   O - Do you know if you stop breathing or has anyone witnessed you stop breathing while you were asleep? (OBSTRUCTION) Yes   P - Do you have high blood PRESSURE or on medication to control high blood pressure? Yes   B - Is your Body Mass Index greater than 35? (BMI) No   A - Are you 50 years old or older? (AGE) Yes   N - Are you a male with a NECK circumference greater than 17 inches, or a female with a neck circumference greater than 16 inches? Yes   G - Are you male? (GENDER) No   STOPBANG Total Score 6   HARPER Risk High Risk         Cardiovascular History  - They are currently on antihypertensive medication. BP at goal  - There is a history of atrial fibrillation with rapid ventricular response, with three separate episodes occurring during a hospital stay approximately 3 to 4 years ago, but no subsequent episodes since discharge.    Medication Refills  - They are seeking refills for their medications, most of which are prescribed by their cardiologist, except  "for their thyroid medication.    Screening History  - They had a mammogram in 12/2024 and a Pap smear in 10/2024.    FAMILY HISTORY  Their sister passed away from cancer at the age of 71, which had metastasized to their lungs and stomach.         ROS:  All ROS negative except for pertinent positives listed above.     Current Medications and Prescriptions Ordered in Epic[1]    Objective:     Exam:  /66 (BP Location: Left arm, Patient Position: Sitting, BP Cuff Size: Adult)   Pulse 96   Temp 36.3 °C (97.4 °F) (Temporal)   Resp 18   Ht 1.676 m (5' 6\")   Wt 92.9 kg (204 lb 12.9 oz)   LMP 06/01/2020 (Approximate)   SpO2 (!) 66%   BMI 33.06 kg/m²  Body mass index is 33.06 kg/m².    Gen: Alert and oriented, No apparent distress.  Neck: Neck is supple without lymphadenopathy.  Lungs: Normal effort, CTA bilaterally, no wheezes, rhonchi, or rales  CV: Regular rate and rhythm. No murmurs, rubs, or gallops.  Ext: No clubbing, cyanosis, edema.      Assessment & Plan:     59 y.o. female with the following -     1. Other fatigue  Persistent tiredness reported.  Discussed untreated sleep apnea as a possible component.  Patient would like to proceed with lab draw at this time before proceeding with sleep study.  Diagnostic plan: Ordered comprehensive lab workup to assess red and white blood cell counts, kidney and liver function, electrolytes, cholesterol, thyroid levels, blood glucose, iron, vitamin D, hepatitis C, B12, and folic acid.  Treatment plan: Provided counseling on fasting for at least 8 hours prior to the test.  Clinical decision making: If lab results are normal, consider home sleep study as the next diagnostic step.  High risk of sleep apnea given STOP-BANG score    2. Hypothyroidism, unspecified type (Primary)  Chronic condition, stable  Due for recheck of thyroid function.  Continue current dose of levothyroxine for now  - TSH WITH REFLEX TO FT4; Future  - levothyroxine (SYNTHROID) 100 MCG Tab; Take 1 " Tablet by mouth every morning on an empty stomach. Needs appt for further refills  Dispense: 90 Tablet; Refill: 3    3. Prediabetes  Chronic condition, stable  Due for recheck  - CBC WITH DIFFERENTIAL; Future  - Comp Metabolic Panel; Future  - HEMOGLOBIN A1C; Future    4. Essential hypertension  Chronic condition, well-controlled  Blood pressure at goal with spironolactone 25 mg, metoprolol 100 mg, Hyzaar 100-25 mg, amlodipine 2.5 mg.  Continue current medications    5. Encounter for vitamin deficiency screening  - VIT B12,  FOLIC ACID  - IRON/TOTAL IRON BIND; Future  - VITAMIN D,25 HYDROXY (DEFICIENCY); Future    6. Lipid screening  - CBC WITH DIFFERENTIAL; Future  - Comp Metabolic Panel; Future  - Lipid Profile; Future    7. Need for vaccination  - Pneumococcal Conjugate Vaccine 20-Valent (6 wks+)  - Tdap =>6yo IM    8. Need for hepatitis C screening test  - HEP C VIRUS ANTIBODY; Future        I spent a total of 26 minutes with record review, exam, communication with the patient, communication with other providers, and documentation of this encounter.      No follow-ups on file.    Please note that this dictation was created using voice recognition software. I have made every reasonable attempt to correct obvious errors, but there may be errors of grammar and possibly content that I did not discover before finalizing the note.             [1]   Current Outpatient Medications Ordered in Epic   Medication Sig Dispense Refill    metoprolol SR (TOPROL XL) 100 MG TABLET SR 24 HR TAKE ONE TABLET BY MOUTH ONE TIME DAILY 90 Tablet 0    levothyroxine (SYNTHROID) 100 MCG Tab Take 1 Tablet by mouth every morning on an empty stomach. Needs appt for further refills 90 Tablet 0    spironolactone (ALDACTONE) 25 MG Tab Take 1 Tablet by mouth every day. 90 Tablet 1    rosuvastatin (CRESTOR) 20 MG Tab TAKE ONE TABLET BY MOUTH AT BEDTIME 90 Tablet 2    clopidogrel (PLAVIX) 75 MG Tab TAKE ONE TABLET BY MOUTH ONE TIME DAILY 90 Tablet  2    losartan-hydrochlorothiazide (HYZAAR) 100-25 MG per tablet TAKE ONE TABLET BY MOUTH ONE TIME DAILY 90 Tablet 2    amLODIPine (NORVASC) 2.5 MG Tab Take 1 Tablet by mouth every day. 90 Tablet 3    acetaminophen (TYLENOL) 500 MG Tab Take 2 Tablets by mouth every 6 hours as needed for Moderate Pain. 30 Tablet 0     No current Epic-ordered facility-administered medications on file.

## 2025-05-30 ENCOUNTER — HOSPITAL ENCOUNTER (OUTPATIENT)
Dept: LAB | Facility: MEDICAL CENTER | Age: 60
End: 2025-05-30
Attending: PHYSICIAN ASSISTANT
Payer: COMMERCIAL

## 2025-05-30 DIAGNOSIS — R73.03 PREDIABETES: ICD-10-CM

## 2025-05-30 DIAGNOSIS — E03.9 HYPOTHYROIDISM, UNSPECIFIED TYPE: ICD-10-CM

## 2025-05-30 DIAGNOSIS — Z13.220 LIPID SCREENING: ICD-10-CM

## 2025-05-30 DIAGNOSIS — Z13.21 ENCOUNTER FOR VITAMIN DEFICIENCY SCREENING: ICD-10-CM

## 2025-05-30 DIAGNOSIS — Z11.59 NEED FOR HEPATITIS C SCREENING TEST: ICD-10-CM

## 2025-05-30 LAB
25(OH)D3 SERPL-MCNC: 26 NG/ML (ref 30–100)
ALBUMIN SERPL BCP-MCNC: 4.3 G/DL (ref 3.2–4.9)
ALBUMIN/GLOB SERPL: 1.5 G/DL
ALP SERPL-CCNC: 100 U/L (ref 30–99)
ALT SERPL-CCNC: 32 U/L (ref 2–50)
ANION GAP SERPL CALC-SCNC: 11 MMOL/L (ref 7–16)
AST SERPL-CCNC: 22 U/L (ref 12–45)
BASOPHILS # BLD AUTO: 0.4 % (ref 0–1.8)
BASOPHILS # BLD: 0.03 K/UL (ref 0–0.12)
BILIRUB SERPL-MCNC: 0.3 MG/DL (ref 0.1–1.5)
BUN SERPL-MCNC: 18 MG/DL (ref 8–22)
CALCIUM ALBUM COR SERPL-MCNC: 9.9 MG/DL (ref 8.5–10.5)
CALCIUM SERPL-MCNC: 10.1 MG/DL (ref 8.5–10.5)
CHLORIDE SERPL-SCNC: 106 MMOL/L (ref 96–112)
CHOLEST SERPL-MCNC: 130 MG/DL (ref 100–199)
CO2 SERPL-SCNC: 24 MMOL/L (ref 20–33)
CREAT SERPL-MCNC: 0.94 MG/DL (ref 0.5–1.4)
EOSINOPHIL # BLD AUTO: 0.52 K/UL (ref 0–0.51)
EOSINOPHIL NFR BLD: 7.7 % (ref 0–6.9)
ERYTHROCYTE [DISTWIDTH] IN BLOOD BY AUTOMATED COUNT: 44.2 FL (ref 35.9–50)
EST. AVERAGE GLUCOSE BLD GHB EST-MCNC: 120 MG/DL
FASTING STATUS PATIENT QL REPORTED: NORMAL
FOLATE SERPL-MCNC: 19.7 NG/ML
GFR SERPLBLD CREATININE-BSD FMLA CKD-EPI: 70 ML/MIN/1.73 M 2
GLOBULIN SER CALC-MCNC: 2.9 G/DL (ref 1.9–3.5)
GLUCOSE SERPL-MCNC: 101 MG/DL (ref 65–99)
HBA1C MFR BLD: 5.8 % (ref 4–5.6)
HCT VFR BLD AUTO: 45.2 % (ref 37–47)
HCV AB SER QL: NORMAL
HDLC SERPL-MCNC: 37 MG/DL
HGB BLD-MCNC: 14.9 G/DL (ref 12–16)
IMM GRANULOCYTES # BLD AUTO: 0.02 K/UL (ref 0–0.11)
IMM GRANULOCYTES NFR BLD AUTO: 0.3 % (ref 0–0.9)
IRON SATN MFR SERPL: 22 % (ref 15–55)
IRON SERPL-MCNC: 71 UG/DL (ref 40–170)
LDLC SERPL CALC-MCNC: 68 MG/DL
LYMPHOCYTES # BLD AUTO: 1.52 K/UL (ref 1–4.8)
LYMPHOCYTES NFR BLD: 22.6 % (ref 22–41)
MCH RBC QN AUTO: 30.1 PG (ref 27–33)
MCHC RBC AUTO-ENTMCNC: 33 G/DL (ref 32.2–35.5)
MCV RBC AUTO: 91.3 FL (ref 81.4–97.8)
MONOCYTES # BLD AUTO: 0.61 K/UL (ref 0–0.85)
MONOCYTES NFR BLD AUTO: 9.1 % (ref 0–13.4)
NEUTROPHILS # BLD AUTO: 4.04 K/UL (ref 1.82–7.42)
NEUTROPHILS NFR BLD: 59.9 % (ref 44–72)
NRBC # BLD AUTO: 0 K/UL
NRBC BLD-RTO: 0 /100 WBC (ref 0–0.2)
PLATELET # BLD AUTO: 249 K/UL (ref 164–446)
PMV BLD AUTO: 10.8 FL (ref 9–12.9)
POTASSIUM SERPL-SCNC: 4.4 MMOL/L (ref 3.6–5.5)
PROT SERPL-MCNC: 7.2 G/DL (ref 6–8.2)
RBC # BLD AUTO: 4.95 M/UL (ref 4.2–5.4)
SODIUM SERPL-SCNC: 141 MMOL/L (ref 135–145)
TIBC SERPL-MCNC: 317 UG/DL (ref 250–450)
TRIGL SERPL-MCNC: 123 MG/DL (ref 0–149)
TSH SERPL DL<=0.005 MIU/L-ACNC: 1.87 UIU/ML (ref 0.38–5.33)
UIBC SERPL-MCNC: 246 UG/DL (ref 110–370)
VIT B12 SERPL-MCNC: 501 PG/ML (ref 211–911)
WBC # BLD AUTO: 6.7 K/UL (ref 4.8–10.8)

## 2025-05-30 PROCEDURE — 82306 VITAMIN D 25 HYDROXY: CPT

## 2025-05-30 PROCEDURE — 80053 COMPREHEN METABOLIC PANEL: CPT

## 2025-05-30 PROCEDURE — 36415 COLL VENOUS BLD VENIPUNCTURE: CPT

## 2025-05-30 PROCEDURE — 84443 ASSAY THYROID STIM HORMONE: CPT

## 2025-05-30 PROCEDURE — 85025 COMPLETE CBC W/AUTO DIFF WBC: CPT

## 2025-05-30 PROCEDURE — 82746 ASSAY OF FOLIC ACID SERUM: CPT

## 2025-05-30 PROCEDURE — 83036 HEMOGLOBIN GLYCOSYLATED A1C: CPT

## 2025-05-30 PROCEDURE — 83550 IRON BINDING TEST: CPT

## 2025-05-30 PROCEDURE — 82607 VITAMIN B-12: CPT

## 2025-05-30 PROCEDURE — 86803 HEPATITIS C AB TEST: CPT

## 2025-05-30 PROCEDURE — 80061 LIPID PANEL: CPT

## 2025-05-30 PROCEDURE — 83540 ASSAY OF IRON: CPT

## 2025-06-02 ENCOUNTER — RESULTS FOLLOW-UP (OUTPATIENT)
Dept: MEDICAL GROUP | Facility: LAB | Age: 60
End: 2025-06-02

## 2025-06-08 DIAGNOSIS — I10 PRIMARY HYPERTENSION: ICD-10-CM

## 2025-06-09 ENCOUNTER — TELEPHONE (OUTPATIENT)
Dept: CARDIOLOGY | Facility: MEDICAL CENTER | Age: 60
End: 2025-06-09
Payer: COMMERCIAL

## 2025-06-09 RX ORDER — AMLODIPINE BESYLATE 2.5 MG/1
2.5 TABLET ORAL DAILY
Qty: 90 TABLET | Refills: 0 | Status: SHIPPED | OUTPATIENT
Start: 2025-06-09 | End: 2025-06-24 | Stop reason: SDUPTHER

## 2025-06-09 NOTE — TELEPHONE ENCOUNTER
Is the patient due for a refill? Yes    Was the patient seen the last 15 months? Yes    Date of last office visit: 7/15/24    Does the patient have an upcoming appointment?  No   If yes, When?     Provider to refill:KM    Does the patient have retirement Plus and need 100-day supply? (This applies to ALL medications) Patient does not have SCP

## 2025-06-23 NOTE — PROGRESS NOTES
Chief Complaint   Patient presents with    Hypertension    MI (Non ST Segment Elevation MI)      Subjective:   Armen Bah is a 58 y.o. female who presents today for follow-up.     Patient of Dr. Menchaca.  Current medical problems include hypertension, hyperlipidemia and CCS of 0 in . In 2022 she had NSTEMI found to have multivessel disease on coronary angiogram. She was referred for bypass surgery due to her elevated glucose readings and young age.     Underwent two-vessel coronary artery bypass (LIMA-LAD, reverse SVG to posterolateral artery) by Dr. Smyth 10/18/2022.  Postoperatively she had atrial fibrillation RVR  for about 30 min which was treated with IV amiodarone.    She returns for 1 year follow up. Overall Sarika is doing well.  She is not experiencing angina.  She still feels short of breath when she is carrying groceries up her flight of stairs to her apartment.  She reports myalgias in her legs and stopped the rosuvastatin.  This slightly improved her leg discomfort although she restarted it as she wanted to discuss with her provider.  She had similar symptoms with the atorvastatin in the past.    She has not been monitoring her blood pressures at home.      Her father had coronary artery disease with several heart attacks starting in his 60s or 70s.  He ultimately  from complications of a stroke.  No other family history of coronary artery disease including 5 of her 6 siblings. Her brother  in his sleep at age 63 from possible heart attack.     Past Medical History:   Diagnosis Date    Anesthesia     slow to wake up    Heart palpitations     Hypertension     Hypothyroid     Vitamin D deficiency          Family History   Problem Relation Age of Onset    Arthritis Mother     Hypertension Mother     Heart Disease Father 67        heart attack    Diabetes Father     Hypertension Sister          Social History     Tobacco Use    Smoking status: Never    Smokeless tobacco: Never  "  Vaping Use    Vaping status: Never Used   Substance Use Topics    Alcohol use: Not Currently     Comment: occ    Drug use: No         Allergies   Allergen Reactions    Ace Inhibitors Cough    Rosuvastatin Myalgia    Atorvastatin Unspecified     Tolerating crestor         Current Outpatient Medications   Medication Sig    Evolocumab (REPATHA SURECLICK) 140 MG/ML Solution Auto-injector SubQ injection pen Inject 1 mL under the skin every 14 days.    metoprolol SR (TOPROL XL) 100 MG TABLET SR 24 HR Take 1 Tablet by mouth every day.    spironolactone (ALDACTONE) 25 MG Tab Take 1 Tablet by mouth every day.    clopidogrel (PLAVIX) 75 MG Tab Take 1 Tablet by mouth every day.    amLODIPine (NORVASC) 2.5 MG Tab Take 1 Tablet by mouth every day.    levothyroxine (SYNTHROID) 100 MCG Tab Take 1 Tablet by mouth every morning on an empty stomach. Needs appt for further refills    losartan-hydrochlorothiazide (HYZAAR) 100-25 MG per tablet TAKE ONE TABLET BY MOUTH ONE TIME DAILY    acetaminophen (TYLENOL) 500 MG Tab Take 2 Tablets by mouth every 6 hours as needed for Moderate Pain.         Review of Systems   Respiratory:  Positive for shortness of breath.    Cardiovascular:  Negative for chest pain, palpitations, orthopnea, leg swelling and PND.   Musculoskeletal:  Negative for falls.   Neurological:  Negative for dizziness and loss of consciousness.          Objective:   BP (!) 142/84 (BP Location: Right arm, Patient Position: Sitting, BP Cuff Size: Adult)   Pulse 72   Resp 14   Ht 1.676 m (5' 6\")   Wt 93.4 kg (206 lb)   SpO2 97%  Body mass index is 33.25 kg/m².         Physical Exam  Vitals reviewed.   Constitutional:       Appearance: She is obese.   HENT:      Head: Normocephalic and atraumatic.   Cardiovascular:      Rate and Rhythm: Normal rate and regular rhythm.   Pulmonary:      Effort: Pulmonary effort is normal. No respiratory distress.      Breath sounds: No rales.   Musculoskeletal:      Right lower leg: No " edema.      Left lower leg: No edema.   Skin:     General: Skin is warm and dry.   Neurological:      Mental Status: She is alert.   Psychiatric:         Judgment: Judgment normal.            Assessment:     1. Statin intolerance  Evolocumab (REPATHA SURECLICK) 140 MG/ML Solution Auto-injector SubQ injection pen      2. Essential hypertension  metoprolol SR (TOPROL XL) 100 MG TABLET SR 24 HR      3. Heart failure, unspecified HF chronicity, unspecified heart failure type (HCC)  spironolactone (ALDACTONE) 25 MG Tab      4. Ischemic dilated cardiomyopathy (HCC)  spironolactone (ALDACTONE) 25 MG Tab      5. S/P CABG x 2  clopidogrel (PLAVIX) 75 MG Tab      6. Mixed hyperlipidemia  clopidogrel (PLAVIX) 75 MG Tab      7. Primary hypertension  amLODIPine (NORVASC) 2.5 MG Tab               Medical Decision Making:  Today's Assessment / Plan:   NSTEMI   Multivessel CAD  Hyperlipidemia  Family history of CAD and sudden death  -S.P CABG x2 (LIMA-LAD, rSVG to PL branch) 2022  -Continue Plavix  -Continue beta-blocker therapy  -high intensity statin: see below    Statin intolerance due to myalgias  - She has now developed myalgias with both atorvastatin and rosuvastatin.  After a brief holiday her myalgias improved.  We will stop the statin therapy and start PCSK9 inhibitor.  Goal LDL less than 55    Ischemic Cardiomyopathy, recovered EF  - continue BB, ARB and spironolactone      LVOT obstruction on previous echo  - no gradients on repeat. LVH secondary to hypertensive disease     Hypertension, elevated in office today.   - home readings, will check this week. If >130/80 change losartan-hctz to olmesartan-hctz 40-25, increase spironolactone to 50mg. BMP in 2 weeks.   - cont amlodipine 2.5mg     Return in about 1 year (around 6/24/2026) for follow up with me.  Sooner if problems.

## 2025-06-24 ENCOUNTER — DOCUMENTATION (OUTPATIENT)
Dept: PHARMACY | Facility: MEDICAL CENTER | Age: 60
End: 2025-06-24

## 2025-06-24 ENCOUNTER — OFFICE VISIT (OUTPATIENT)
Dept: CARDIOLOGY | Facility: MEDICAL CENTER | Age: 60
End: 2025-06-24
Attending: PHYSICIAN ASSISTANT
Payer: COMMERCIAL

## 2025-06-24 ENCOUNTER — TELEPHONE (OUTPATIENT)
Dept: CARDIOLOGY | Facility: MEDICAL CENTER | Age: 60
End: 2025-06-24

## 2025-06-24 DIAGNOSIS — I10 PRIMARY HYPERTENSION: ICD-10-CM

## 2025-06-24 DIAGNOSIS — I42.0 ISCHEMIC DILATED CARDIOMYOPATHY (HCC): ICD-10-CM

## 2025-06-24 DIAGNOSIS — Z95.1 S/P CABG X 2: ICD-10-CM

## 2025-06-24 DIAGNOSIS — E78.2 MIXED HYPERLIPIDEMIA: ICD-10-CM

## 2025-06-24 DIAGNOSIS — I10 ESSENTIAL HYPERTENSION: ICD-10-CM

## 2025-06-24 DIAGNOSIS — I25.5 ISCHEMIC DILATED CARDIOMYOPATHY (HCC): ICD-10-CM

## 2025-06-24 DIAGNOSIS — Z78.9 STATIN INTOLERANCE: Primary | ICD-10-CM

## 2025-06-24 DIAGNOSIS — I50.9 HEART FAILURE, UNSPECIFIED HF CHRONICITY, UNSPECIFIED HEART FAILURE TYPE (HCC): ICD-10-CM

## 2025-06-24 PROCEDURE — 3077F SYST BP >= 140 MM HG: CPT | Performed by: PHYSICIAN ASSISTANT

## 2025-06-24 PROCEDURE — 99212 OFFICE O/P EST SF 10 MIN: CPT | Performed by: PHYSICIAN ASSISTANT

## 2025-06-24 PROCEDURE — RXMED WILLOW AMBULATORY MEDICATION CHARGE: Performed by: PHYSICIAN ASSISTANT

## 2025-06-24 PROCEDURE — 3079F DIAST BP 80-89 MM HG: CPT | Performed by: PHYSICIAN ASSISTANT

## 2025-06-24 PROCEDURE — 99214 OFFICE O/P EST MOD 30 MIN: CPT | Performed by: PHYSICIAN ASSISTANT

## 2025-06-24 RX ORDER — EVOLOCUMAB 140 MG/ML
140 INJECTION, SOLUTION SUBCUTANEOUS
Qty: 6 ML | Refills: 3 | Status: SHIPPED | OUTPATIENT
Start: 2025-06-24

## 2025-06-24 RX ORDER — METOPROLOL SUCCINATE 100 MG/1
100 TABLET, EXTENDED RELEASE ORAL DAILY
Qty: 90 TABLET | Refills: 3 | Status: SHIPPED | OUTPATIENT
Start: 2025-06-24

## 2025-06-24 RX ORDER — SPIRONOLACTONE 25 MG/1
25 TABLET ORAL DAILY
Qty: 90 TABLET | Refills: 3 | Status: SHIPPED | OUTPATIENT
Start: 2025-06-24 | End: 2025-06-30 | Stop reason: SDUPTHER

## 2025-06-24 RX ORDER — CLOPIDOGREL BISULFATE 75 MG/1
75 TABLET ORAL DAILY
Qty: 90 TABLET | Refills: 3 | Status: SHIPPED | OUTPATIENT
Start: 2025-06-24

## 2025-06-24 RX ORDER — AMLODIPINE BESYLATE 2.5 MG/1
2.5 TABLET ORAL DAILY
Qty: 90 TABLET | Refills: 3 | Status: SHIPPED | OUTPATIENT
Start: 2025-06-24

## 2025-06-24 ASSESSMENT — ENCOUNTER SYMPTOMS
PND: 0
ORTHOPNEA: 0
FALLS: 0
DIZZINESS: 0
LOSS OF CONSCIOUSNESS: 0
SHORTNESS OF BREATH: 1
PALPITATIONS: 0

## 2025-06-24 ASSESSMENT — FIBROSIS 4 INDEX: FIB4 SCORE: 0.91

## 2025-06-24 NOTE — TELEPHONE ENCOUNTER
Patient Phone Number: [phone]  Medication: Evolocumab (REPATHA SURECLICK) 140 MG/ML Solution Auto-injector SubQ injection pen (Quantity 6ml, Day Supply 84)  Copay: $60  Additional information/consent to FA: Notified patient of their copay. Patient gave verbal consent to obtain FA through [employer]. Patient does not have Government sponsored insurance.

## 2025-06-24 NOTE — TELEPHONE ENCOUNTER
Received [employer] request via [employer]  for Evolocumab (REPATHA SURECLICK) 140 MG/ML Solution Auto-injector SubQ injection pen . (Quantity:6ml, Day Supply:84)     Insurance: [employer]  Member ID:  [id number]  BIN: [id number]  PCN: [id number]  Group: [id number]     Ran Test claim via [place] & medication Pays for a $60/84 copay. Will outreach to patient to offer specialty pharmacy services and or release to preferred pharmacy    Called and offered pt our services. He would like to onboard. I also will sign her up for a copay card to bring down her copay to $45/84ds. She will  from our pharmacy on 6/25.

## 2025-06-24 NOTE — PROGRESS NOTES
PHARMACIST CLINICAL ONBOARDING - Clinical Onboarding -   Result = Complete, Next card status: New Start    Therapeutic Category: Hyperlipidemia  ICD10: E782, I214  Diagnosis Details: Mixed hyperlipidemia [E78.2]; NSTEMI (non-ST elevated myocardial infarction) [I21.4]  Medication(s) associated with Therapeutic Category: Repatha SureClick Solution Auto-injector 140 MG/ML  Medication(s) prescribed for FDA approved indication?   -Repatha SureClick Solution Auto-injector 140 MG/ML: Yes  Full drug therapy: Mixed hyperlipidemia [E78.2]; NSTEMI (non-ST elevated myocardial infarction) [I21.4]  Past treatments: Atorvastatin, Rosuvastatin   Goals of therapy: Promote or maintain optimal therapy administration and adherence, Mitigation of side effects, Implement lifestyle modifications: diet, exercise, weight loss, and/or smoking cessation, Other - Achieve goal LDL levels (<55 mg/dL) to reduce risk of cardiovascular events      Regimen Appropriateness Review: Hyperlipidemia  Any concerning drug and/or non-drug allergies? No  Any concerning drug interactions (drug-drug or dietary)? No  Any concern with prescribed dose (appropriateness, renal, and hepatic adjustments needed)? No  Any comorbidities, past medical history, precautions, or contraindications that pose a medication safety concern? No  Any relevant lab work needed that affects the initial start date? No  Any issues with patient's ability to self-administer medication? No      PHARMACIST CLINICAL MANAGEMENT - INITIAL CLINICAL ASSESSMENT -   Result = Complete, Next card status: First Cycle    INITIAL CLINICAL ASSESSMENT  Repatha SureClick Solution Auto-injector 140 MG/ML - Hyperlipidemia - New start    Encounter Information: Telephonic assessment, 06/24/2025, Patient    SUBJECTIVE  Signs and symptoms of condition   -Hyperlipidemia: Reviewed, SOB upon exertion (walking up/down stairs)    Expected adherence   -Repatha SureClick Solution Auto-injector 140 MG/ML: No adherence  concerns    Functional Limitations: None    High risk features: None    Communicable infections: No communicable infections    OBJECTIVE  Clinically relevant medication and/or disease state labs   -Hyperlipidemia: Reviewed, Clinically Relevant, Abnormal Labs from 5/30/25 unless otherwise noted:     - CBC: WNL    - Chem7: Glucose 101 (H)   - LFTs:  (H)   - Lipids:   HDL 37 LDL 68   - A1c: 5.8   - HeFH/HoFH: not noted in EMR   - BP/HR: 142/84 (6/24/25)    ASSESSMENT  Drug therapy, administration, and proper use   -Repatha SureClick Solution Auto-injector 140 MG/ML: Reviewed, Repatha 140mg/mL SureClick pen injecting 1mL (140mg) under the skin every 14 days    - Administration: reviewed to take the device out of the fridge at least 30 minutes prior to injection; wash hands, inspect device (liquid should be clear, colorless, or slightly yellow), check expiration date; clean injection site with alcohol swab; she can self-inject into abdomen (at least 2 inches away from navel) or front part of thighs (at least 2 inches below groin and 2 inches above knee); when ready to inject, pull the orange cap straight off; once the cap is removed use Repatha within 5 minutes; hold Repatha at the injection site at a 90 degree angle, press pen firmly against skin and when ready press and release the grey activator button; keep pressing Repatha against the skin; the 2nd click indicates the injection is complete and the viewing window will turn yellow; the entire process may take ~15 seconds to complete; remove pen and dispose    Proper handling and disposal   -Repatha SureClick Solution Auto-injector 140 MG/ML: Reviewed, Reviewed to dispose of in sharps container or puncture resistant container. Added note in [facility] for [facility] to include sharps container.     Storage and excursion   -Repatha SureClick Solution Auto-injector 140 MG/ML: Reviewed, Reviewed to store under refrigerated temperatures in original box;  pen can be stored out of the fridge at room temperature (< 77°F) for no more than 30 days    Adherence strategies   -Repatha SureClick Solution Auto-injector 140 MG/ML: Reviewed, no adherence concerns noted; recommended use of calendar system to inject every other week     Missed dose management   -Repatha SureClick Solution Auto-injector 140 MG/ML: Reviewed, If it’s within 7 days of missed dose, take it asap and resume normal schedule; if it’s beyond 7 days patient should to wait until the next dose on the original schedule.    Potential common side effects   -Repatha SureClick Solution Auto-injector 140 MG/ML: Reviewed,    Injection site reactions: can use ice pack/cold compress to help with pain/swelling, APAP/NSAIDs for pain, Benadryl cream or Hydrocortisone cream for itching, oral antihistamine   Headache: stay hydrated, OTC Tylenol as needed   Nasopharyngitis: saline nasal spray, nasal corticosteroids, decongestants, APAP/NSAIDs   Hypertension: monitor blood pressure at home    Potential common side effect mitigation strategies   -Repatha SureClick Solution Auto-injector 140 MG/ML: Reviewed, see above for mitigation review     Serious side effects, precautions, and contraindications   -Repatha SureClick Solution Auto-injector 140 MG/ML: Reviewed,    Hypersensitivity, anaphylaxis swelling in your face, hands, throat, difficulty breathing to call 911 and go to ED   Influenza/URI contact provider    Non-pharmacologic drug and disease state counseling   -Hyperlipidemia: Reviewed,   - Diet: avoiding fast food consumption; consumes a lot of vegetables and salads; lean meat intake consists of chicken or ground turkey; limiting red meats and pork   - Exercise: planning to increase walking as she hasn't been staying active; reviewed working up to goal of 120-150 mins of weekly activity in no less than 15 minute increments (moderate aerobic exercise)  - Smoking cessation: N/A; never smoker     Relevant immunization  recommendations: Clinician deferred    In the last 4 weeks has the patient missed any days of work, school, or planned activities due to their condition?   -Hyperlipidemia: Unable to assess    Medication list reconciliation: Reviewed-EMR accurate    When to report changes or new additions to medication list: Educated patient    Clinically significant drug interactions: No drug interactions identified    Common drug interactions and dietary avoidance   -Repatha SureClick Solution Auto-injector 140 MG/ML: Reviewed, none additional     PLAN  Anticipated or confirmed medication start date   -Repatha SureClick Solution Auto-injector 140 MG/ML: 06/25/2025    Intended duration of medication   -Repatha SureClick Solution Auto-injector 140 MG/ML: Reviewed, until progression, toxicity, or no longer clinically beneficial       Goals of therapy   -Hyperlipidemia: Promote or maintain optimal therapy administration and adherence, Mitigation of side effects, Implement lifestyle modifications: diet, exercise, weight loss, and/or smoking cessation, Other; Achieve goal LDL levels (<55 mg/dL) to reduce risk of cardiovascular events    Understanding/agreement of the goals of therapy   -Hyperlipidemia: Agrees/understands goals of therapy    Reasons for selecting patient declined, clinician deferred, or unable to assess: deferred review of immunizations and ADLs to first cycle     Additional summary notes:   Secondary Prevention of clinical ASCVD  Specified LDL goal < 55 mg/dL    I had a pleasant initial counseling call with Sarika to provide education on Repatha. She experienced myalgias to Atorvastatin and Rosuvastatin where she is eager and excited to try Repatha for lipid management. Reviewed counseling points as noted above with verbalized understanding of injection administration steps. She was pleased to hear clinical trial results with expected LDL reduction as she is hopeful to reach her cardiology team's specified LDL goal. She  was reminded to monitor her home blood pressure closely and notify her cardiology team if blood pressure is elevated/uncontrolled. She plans to pickup initial fill from [facility] on 6/25 and start same day. Encouraged she call the pharmacist line should she have questions/issues administering her initial injection. Reviewed pharmacist support call kenna noting pharmacy will follow-up with her next week for first cycle check-in. Sarika thanked me for the time and information provided with no further questions.

## 2025-06-24 NOTE — TELEPHONE ENCOUNTER
Medication: Repatha  Type of Insurance: Commercial  Type of Financial assistance requested Copay Card  Source: GMI  Source Phone #: [phone]  Outcome: Approved  Effective dates: 06/24/25 until 06/24/27  Details/Billing Information:   [id number]  BECCA: KORI  GRP: [id number]  ID: [id number]  Max Award Amount: $2,500  Final Copay: $45

## 2025-06-25 ENCOUNTER — PHARMACY VISIT (OUTPATIENT)
Dept: PHARMACY | Facility: MEDICAL CENTER | Age: 60
End: 2025-06-25
Payer: MEDICARE

## 2025-06-26 ENCOUNTER — TELEPHONE (OUTPATIENT)
Dept: CARDIOLOGY | Facility: MEDICAL CENTER | Age: 60
End: 2025-06-26
Payer: COMMERCIAL

## 2025-06-26 DIAGNOSIS — Z78.9 STATIN INTOLERANCE: ICD-10-CM

## 2025-06-26 DIAGNOSIS — I42.0 ISCHEMIC DILATED CARDIOMYOPATHY (HCC): ICD-10-CM

## 2025-06-26 DIAGNOSIS — I10 ESSENTIAL HYPERTENSION: Primary | ICD-10-CM

## 2025-06-26 DIAGNOSIS — I50.9 HEART FAILURE, UNSPECIFIED HF CHRONICITY, UNSPECIFIED HEART FAILURE TYPE (HCC): ICD-10-CM

## 2025-06-26 DIAGNOSIS — I25.5 ISCHEMIC DILATED CARDIOMYOPATHY (HCC): ICD-10-CM

## 2025-06-26 DIAGNOSIS — I10 PRIMARY HYPERTENSION: ICD-10-CM

## 2025-06-27 NOTE — TELEPHONE ENCOUNTER
"Phone Number Called: [phone]     Call outcome: Spoke to patient regarding message below.    Message: Called to inquire about patient's blood pressures.   Patient accidentally sent Bps to PCP. Message copied and sent to KM.     \"Hi,  Here are my readings for blood pressure from yesterday after my appointment with you.     6/24/2025 @4:50pm.  132/75  6/24/2025 @ 5:10pm. 125/67  6/25/2025  @ 6:00am 143/72  6/25/2025 @ 1:32pm  127/63  6/25/2025 @ 8:37pm 138/71  6/26/2025 @ 4:30pm 147/77     Sarika Mckeon\"    ----- Message from Nurse Kathy ADAMES R.N. sent at 6/26/2025 12:43 PM PDT -----  Regarding: FW: please call to check on home BP readings    ----- Message -----  From: Saima Santizo P.A.-C.  Sent: 6/26/2025  12:00 AM PDT  To: Kendal Schafer R.N.  Subject: please call to check on home BP readings         "

## 2025-06-30 VITALS
WEIGHT: 206 LBS | HEIGHT: 66 IN | DIASTOLIC BLOOD PRESSURE: 63 MMHG | BODY MASS INDEX: 33.11 KG/M2 | HEART RATE: 72 BPM | SYSTOLIC BLOOD PRESSURE: 127 MMHG | RESPIRATION RATE: 14 BRPM | OXYGEN SATURATION: 97 %

## 2025-06-30 RX ORDER — OLMESARTAN MEDOXOMIL AND HYDROCHLOROTHIAZIDE 40/25 40; 25 MG/1; MG/1
1 TABLET ORAL DAILY
Qty: 100 TABLET | Refills: 3 | Status: SHIPPED | OUTPATIENT
Start: 2025-06-30 | End: 2026-08-04

## 2025-06-30 RX ORDER — SPIRONOLACTONE 50 MG/1
50 TABLET, FILM COATED ORAL DAILY
Qty: 100 TABLET | Refills: 3 | Status: SHIPPED | OUTPATIENT
Start: 2025-06-30

## 2025-07-02 ENCOUNTER — DOCUMENTATION (OUTPATIENT)
Dept: PHARMACY | Facility: MEDICAL CENTER | Age: 60
End: 2025-07-02
Payer: COMMERCIAL

## 2025-07-02 NOTE — PROGRESS NOTES
PHARMACIST CLINICAL MANAGEMENT - Clinical Reassessment / Follow-Up -   Result = Complete, Next card status: Stable/Non-Priority Follow Up    Clinical Reassessment / Follow-Up  Repatha SureClick Solution Auto-injector 140 MG/ML - Hyperlipidemia - Brief check-in    Encounter Information: Telephonic assessment, 07/02/2025, Patient    SUBJECTIVE  Signs and symptoms of condition   -Hyperlipidemia: Reviewed, no change from initial assessment     Changes to allergies, diagnoses, or health conditions: Reviewed - no changes    Functional Limitations: None    High risk features: None    Communicable infections: No communicable infections    OBJECTIVE  Clinically relevant medication and/or disease state labs   -Hyperlipidemia: Reviewed, New cardio events since we last spoke:  Clinically Relevant, Abnormal Labs: no new labs     ASSESSMENT  Drug therapy, administration, and proper use   -Repatha SureClick Solution Auto-injector 140 MG/ML: Reviewed, Repatha 140mg/mL SureClick pen injecting 1mL (140mg) under the skin every 14 days   - Administration: self-injected Repatha #1 pen into front top part of thigh; allowed pen to reach room temperature over 30 minutes; injection administration went well without difficulty     Reported missed doses in the past month   -Repatha SureClick Solution Auto-injector 140 MG/ML: Following treatment plan as prescribed    Missed dose management   -Repatha SureClick Solution Auto-injector 140 MG/ML: No missed doses reported    Adherence strategies   -Repatha SureClick Solution Auto-injector 140 MG/ML: Reviewed, not relevant with no missed doses; injecting every other Wednesday     Status of current side effects   -Repatha SureClick Solution Auto-injector 140 MG/ML: Side effects reported-intervention not required,  - injection site discomfort: pain during injection process which resolved after completion of her injection; reminded for use of cold compress/ice pack to help with pain; reviewed pen  typically reaches room temperature if sitting out for at least 30 minutes and if it sit out longer it is acceptable as pen needs to be used within 30 days after removing from the fridge    - nasopharyngitis: some congestion/running nose however symptoms are tolerable and she can self-manage     In the last 4 weeks has the patient missed any days of work, school, or planned activities due to their condition?   -Hyperlipidemia: No    Since the last assessment has the patient had any unplanned medical visits due to their condition?   -Hyperlipidemia: No    Medication list reconciliation: Reviewed-EMR accurate    Clinically significant drug interactions: No drug interactions identified    PLAN  Patient's overall status:    -Hyperlipidemia: Improved    Medication appropriateness   -Repatha SureClick Solution Auto-injector 140 MG/ML: Appropriate    Medication status   -Repatha SureClick Solution Auto-injector 140 MG/ML: Continue Therapy    Anticipated or confirmed medication start date   -Repatha SureClick Solution Auto-injector 140 MG/ML: 06/25/2025    Additional summary notes:   Secondary Prevention of clinical ASCVD  Specified LDL goal < 55 mg/dL    Reached Sarika for first cycle check-in call after her start of Repatha. She completed her first injection on 6/25/25. She noted the injection caused more pain/discomfort than she was expecting however symptoms resolved shortly after the injection was completed. She is aware to rotate her injection sites as she will continue every other Wednesday with her next dose due 7/9/25. Reviewed blood pressure medication changes where she will contact her local [facility] with plans to start Olmesartan/HCTZ as this will replace Losartan/HCTZ and she will increase Spironolactone from 25mg/day to 50mg/day. Discussed immunization recommendations where recommended Shingrix series which she was agreeable to complete. She expressed appreciation for the outreach call and welcomes  future calls from pharmacy for medication support.

## (undated) DEVICE — COVER MAYO STAND X-LG - (22EA/CA)

## (undated) DEVICE — CLIP SM INTNL HRZN TI ESCP LGT - (24EA/PK 25PK/BX)

## (undated) DEVICE — WAX BONE ALKYLENE OXIDE HEMOSTASIS OSTENE 3.5GM (10EA/CA)

## (undated) DEVICE — BLADE SURGICAL #15 - (50/BX 3BX/CA)

## (undated) DEVICE — CANISTER SUCTION 3000ML MECHANICAL FILTER AUTO SHUTOFF MEDI-VAC NONSTERILE LF DISP  (40EA/CA)

## (undated) DEVICE — SYSTEM CHEST DRAIN ADULT/PEDS W/AUTO TRANSFUSION CAPABILITY SAHARA (6EA/CA)

## (undated) DEVICE — SUTURE 6-0 PROLENE RB-2 D/A 30 (36PK/BX)"

## (undated) DEVICE — SODIUM CHL. INJ. 0.9% 500ML (24EA/CA 50CA/PF)

## (undated) DEVICE — TOWELS CLOTH SURGICAL - (4/PK 20PK/CA)

## (undated) DEVICE — LACTATED RINGERS INJ 1000 ML - (14EA/CA 60CA/PF)

## (undated) DEVICE — GOWN SURGEONS LARGE - (32/CA)

## (undated) DEVICE — BAG RESUSCITATION DISPOSABLE - WITH MASK (10 EA/CA)

## (undated) DEVICE — GLOVE BIOGEL PI INDICATOR SZ 6.0 SURGICAL PF LF -(200PR/CA)

## (undated) DEVICE — SENSOR OXIMETER ADULT SPO2 RD SET (20EA/BX)

## (undated) DEVICE — PACK CV DRAPING/BASIN 2PART - (1/CA)

## (undated) DEVICE — CORETEMP DRAPE FORM-FITTED EASY DROPANDGO DRAPE FOR USE ON THE CORETEMP FLUID MANAGEMENT 56IN X 56IN

## (undated) DEVICE — PUNCH DISP VASCULAR 4.4 - 6/BX

## (undated) DEVICE — GLOVE BIOGEL INDICATOR SZ 6.5 SURGICAL PF LTX - (50PR/BX 4BX/CA)

## (undated) DEVICE — DRAPE SLUSH WARMER DISC (24EA/CA)

## (undated) DEVICE — GLOVE BIOGEL SZ 6.5 SURGICAL PF LTX (50PR/BX 4BX/CA)

## (undated) DEVICE — SPRING BULLDOG 1/2 FORCE BLUE - (10/BX)

## (undated) DEVICE — DRAIN SILICONE CLOSED WOUND SUCTION CHANNEL 24FR ROUND HUBLESS (10/CA)

## (undated) DEVICE — DRESSING SURGICAL NUKNIT 6X9 (10EA/BX)

## (undated) DEVICE — ADHESIVE MASTISOL - (48/BX)

## (undated) DEVICE — SUCTION INSTRUMENT YANKAUER BULBOUS TIP W/O VENT (50EA/CA)

## (undated) DEVICE — CONTAINER SPECIMEN BAG OR - STERILE 4 OZ W/LID (100EA/CA)

## (undated) DEVICE — SET BIFURCATED BLOOD - (48EA/CS)

## (undated) DEVICE — WIRE STEEL 5-0 B&S 20 OHS - 5/PK 12PK/BX ITEM. D5329 OR D6625 CAN BE USED AS A SUB

## (undated) DEVICE — KIT RADIAL ARTERY 20GA W/MAX BARRIER AND BIOPATCH  (5EA/CA) #10740 IS FOR THE SET RADIAL ARTERIAL

## (undated) DEVICE — GLOVE BIOGEL SZ 6 PF LATEX - (50EA/BX 4BX/CA)

## (undated) DEVICE — SYRINGE NON SAFETY 3 CC 21 GA X 1 1/2 IN (100/BX 8BX/CA)

## (undated) DEVICE — TUBING INSUFFLATION - (10/BX)

## (undated) DEVICE — GLOVE BIOGEL PI ORTHO SZ 6 SURGICAL PF LF (40PR/BX)

## (undated) DEVICE — SOLUTION NORMOSOL-4 INJ 1000ML

## (undated) DEVICE — SUTURE 0 ETHIBOND MO6 C/R - (12/BX) 8-18 INCH ETHICON

## (undated) DEVICE — SUTURE OHS

## (undated) DEVICE — STOPCOCK MALE 4-WAY - (50/CA)

## (undated) DEVICE — SPONGE XRAY 8X4 STERL. 12PL - (10EA/TY 80TY/CA)

## (undated) DEVICE — TUBING CLEARLINK DUO-VENT - C-FLO (48EA/CA)

## (undated) DEVICE — BLADE BEAVER 6900 MINI SHARP ALL AROUND (20/CA)

## (undated) DEVICE — MICRODRIP PRIMARY VENTED 60 (48EA/CA) THIS WAS PART #2C8428 WHICH WAS DISCONTINUED

## (undated) DEVICE — SYSTEM PEEL & PLACE 13CM INCISIONS

## (undated) DEVICE — GLOVE BIOGEL PI INDICATOR SZ 7.0 SURGICAL PF LF - (50/BX 4BX/CA)

## (undated) DEVICE — STAPLER SKIN DISP - (6/BX 10BX/CA) VISISTAT

## (undated) DEVICE — KIT ENDOHARVEST SYSTEM 8 - MUST ORDER 5 AT A TIME

## (undated) DEVICE — SUTURE 3-0 PROLENE SH 36 (36PK/BX)"

## (undated) DEVICE — TRAY CATHETER FOLEY URINE METER W/STATLOCK 350ML (10EA/CA)

## (undated) DEVICE — GLOVE BIOGEL INDICATOR SZ 7.5 SURGICAL PF LTX - (50PR/BX 4BX/CA)

## (undated) DEVICE — BLOWER/MISTER (5EA/PK)

## (undated) DEVICE — BANDAGE ELASTIC 6 HONEYCOMB - 6X5YD LF (20/CA)"

## (undated) DEVICE — RETRACTOR OFF PUMP OCTO ONLY - 10/BX

## (undated) DEVICE — SODIUM CHL IRRIGATION 0.9% 1000ML (12EA/CA)

## (undated) DEVICE — GLOVE BIOGEL PI ORTHO SZ 7.5 PF LF (40PR/BX)

## (undated) DEVICE — SUTURE 7-0 PROLENE BV-1 D/A 24 (36PK/BX)"

## (undated) DEVICE — DECANTER FLD BLS - (50/CA)

## (undated) DEVICE — TOWEL STOP TIMEOUT SAFETY FLAG (40EA/CA)

## (undated) DEVICE — GLOVE BIOGEL INDICATOR SZ 7SURGICAL PF LTX - (50/BX 4BX/CA)

## (undated) DEVICE — KIT CATHETERIZATION TWO-LUMEN CENTRAL VENOUS PI CVC (5EA/CA)

## (undated) DEVICE — ELECTRODE DUAL RETURN W/ CORD - (50/PK)

## (undated) DEVICE — KIT TOURNIQUET DLP (40EA/PK)

## (undated) DEVICE — SET FLUID WARMING STANDARD FLOW - (10/CA)

## (undated) DEVICE — SUTURE 4-0 30CM STRATAFIX SPIRAL PS-2 (12EA/BX)

## (undated) DEVICE — DRESSING TRANSPARENT FILM TEGADERM 2.375 X 2.75"  (100EA/BX)"

## (undated) DEVICE — SET LEADWIRE 5 LEAD BEDSIDE DISPOSABLE ECG (1SET OF 5/EA)

## (undated) DEVICE — PACK VEIN - (19/CA)

## (undated) DEVICE — SET EXTENSION WITH 2 PORTS (48EA/CA) ***PART #2C8610 IS A SUBSTITUTE*****

## (undated) DEVICE — LEAD PACING TEMP MYO - (12/BX)

## (undated) DEVICE — TUBE CHEST 32FR SOFT STRAIGHT (10EA/BX)

## (undated) DEVICE — TRANSDUCER BIFURCATED MONITORING KIT (10EA/CA)

## (undated) DEVICE — SPONGE GAUZESTER. 2X2 4-PL - (2/PK 50PK/BX 30BX/CS)

## (undated) DEVICE — BAG SPONGE COUNT 10.25 X 32 - BLUE (250/CA)

## (undated) DEVICE — TRAY SURESTEP FOLEY TEMP SENSING 16FR (10EA/CA) ORDER  #18764 FOR TEMP FOLEY ONLY

## (undated) DEVICE — SUTURE 5 SURGICAL STEEL V-40 - (12/BX) CCS CURRENT

## (undated) DEVICE — INSERT STEALTH #5 - (10/BX)

## (undated) DEVICE — BLADE STERNUM SAW SURGICAL 32.0 X 6.4 MM STERILE (1/EA)

## (undated) DEVICE — CONNECTOR HUBLESS DRAINAGE - ONE WAY (20/BX)